# Patient Record
Sex: FEMALE | Race: WHITE | HISPANIC OR LATINO | Employment: STUDENT | URBAN - METROPOLITAN AREA
[De-identification: names, ages, dates, MRNs, and addresses within clinical notes are randomized per-mention and may not be internally consistent; named-entity substitution may affect disease eponyms.]

---
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
## 2020-01-02 ENCOUNTER — TELEPHONE (OUTPATIENT)
Dept: INTERNAL MEDICINE CLINIC | Facility: CLINIC | Age: 20
End: 2020-01-02

## 2020-01-02 DIAGNOSIS — N64.4 BREAST PAIN: Primary | ICD-10-CM

## 2020-01-02 DIAGNOSIS — N64.59 INVERTED NIPPLE: ICD-10-CM

## 2020-01-02 NOTE — TELEPHONE ENCOUNTER
Patient called requesting an order for a mammogram before she goes back to college       Please contact Kenia and let her know if this can be done at    [phone]

## 2020-01-07 ENCOUNTER — TELEPHONE (OUTPATIENT)
Dept: INTERNAL MEDICINE CLINIC | Facility: CLINIC | Age: 20
End: 2020-01-07

## 2020-01-07 ENCOUNTER — HOSPITAL ENCOUNTER (OUTPATIENT)
Dept: MAMMOGRAPHY | Facility: CLINIC | Age: 20
Discharge: HOME | End: 2020-01-07
Payer: COMMERCIAL

## 2020-01-07 ENCOUNTER — HOSPITAL ENCOUNTER (OUTPATIENT)
Dept: ULTRASOUND IMAGING | Facility: CLINIC | Age: 20
Discharge: HOME | End: 2020-01-07
Payer: COMMERCIAL

## 2020-01-07 DIAGNOSIS — N64.4 BREAST PAIN: ICD-10-CM

## 2020-01-07 DIAGNOSIS — N64.59 INVERTED NIPPLE: ICD-10-CM

## 2020-01-07 PROCEDURE — 76642 ULTRASOUND BREAST LIMITED: CPT

## 2020-04-07 ENCOUNTER — TELEMEDICINE (OUTPATIENT)
Dept: INTERNAL MEDICINE CLINIC | Facility: CLINIC | Age: 20
End: 2020-04-07
Payer: COMMERCIAL

## 2020-04-07 VITALS — WEIGHT: 112 LBS | HEIGHT: 63 IN | BODY MASS INDEX: 19.84 KG/M2

## 2020-04-07 DIAGNOSIS — F41.9 ANXIETY: Primary | ICD-10-CM

## 2020-04-07 DIAGNOSIS — J45.990 EXERCISE-INDUCED ASTHMA: ICD-10-CM

## 2020-04-07 PROCEDURE — 3008F BODY MASS INDEX DOCD: CPT | Performed by: INTERNAL MEDICINE

## 2020-04-07 PROCEDURE — 99214 OFFICE O/P EST MOD 30 MIN: CPT | Performed by: INTERNAL MEDICINE

## 2020-04-07 RX ORDER — CLONAZEPAM 0.5 MG/1
0.5 TABLET ORAL 2 TIMES DAILY PRN
Qty: 45 TABLET | Refills: 0 | Status: SHIPPED | OUTPATIENT
Start: 2020-04-07 | End: 2020-11-04 | Stop reason: SDUPTHER

## 2020-09-10 NOTE — PROGRESS NOTES
Chief Complaint  Left forearm PPD reading- 0 mm, negative  Froylan Judy  Active Problems    1  Anxiety (300 00) (F41 9)   2  Encounter for PPD test (V74 1) (Z11 1)   3  Encounter for vitamin deficiency screening (V77 99) (Z13 21)   4  Exercise-induced asthma (493 81) (J45 990)   5  Inability to sleep (780 50) (G47 9)   6  Laryngotracheitis (464 20) (J04 2)   7  Lyme disease (088 81) (A69 20)   8  Need for hepatitis A immunization (V05 3) (Z23)   9  Screening for iron deficiency anemia (V78 0) (Z13 0)   10  Vitamin D deficiency (268 9) (E55 9)    Current Meds   1  [facility] Melatonin 3 MG Oral Tablet; TAKE 1 TABLE AT BEDTIME FOR SLEEP  MAY   INCREASE TO 2 TABLETS AS NEEDED; Therapy: 86TSM5632 to (Last Rx:74Nys9883)  Requested for: 23XPM4669 Ordered   2  Sertraline HCl - 25 MG Oral Tablet; TAKE 1 TABLET Bedtime; Therapy: 60IJR2575 to (Complete:15Ktl2199)  Requested for: 26Apr2017; Last   Rx:41Mlv0746 Ordered   3  Ventolin  (90 Base) MCG/ACT Inhalation Aerosol Solution; INHALE 2 PUFFS   EVERY 4 HOURS AS NEEDED FOR WHEEZING; Therapy: 02GHJ4828 to (Last [facility])  Requested for: 75Btv8597 Ordered   4  Vitamin D3 2000 UNIT Oral Capsule; TAKE 2 CAPSULES BY MOUTH EVERY DAY; Therapy: 86MCF0755 to (Last Rx:31Jtw0972)  Requested for: 93EOQ9400 Ordered    Allergies    1  No Known Drug Allergies    Future Appointments    Date/Time Provider Specialty Site   07/20/2017 04:00 PM James Sarmiento MD Pediatrics [address]     Signatures   Electronically signed by :  Idris Ash, ; Jul 19 2017 11:29AM EST                       (Author)    Electronically signed by : Beth Zhang DO; Jul 19 2017  1:48PM EST                       (Co-participant) 82

## 2020-11-04 ENCOUNTER — OFFICE VISIT (OUTPATIENT)
Dept: FAMILY MEDICINE CLINIC | Facility: CLINIC | Age: 20
End: 2020-11-04
Payer: COMMERCIAL

## 2020-11-04 VITALS
WEIGHT: 98.2 LBS | DIASTOLIC BLOOD PRESSURE: 62 MMHG | TEMPERATURE: 98.7 F | SYSTOLIC BLOOD PRESSURE: 112 MMHG | BODY MASS INDEX: 18.07 KG/M2 | OXYGEN SATURATION: 91 % | HEART RATE: 79 BPM | HEIGHT: 62 IN

## 2020-11-04 DIAGNOSIS — F32.A ANXIETY AND DEPRESSION: ICD-10-CM

## 2020-11-04 DIAGNOSIS — R11.2 INTRACTABLE VOMITING WITH NAUSEA, UNSPECIFIED VOMITING TYPE: ICD-10-CM

## 2020-11-04 DIAGNOSIS — F41.9 ANXIETY: ICD-10-CM

## 2020-11-04 DIAGNOSIS — Z00.00 ANNUAL PHYSICAL EXAM: Primary | ICD-10-CM

## 2020-11-04 DIAGNOSIS — F12.10 MARIJUANA ABUSE: ICD-10-CM

## 2020-11-04 DIAGNOSIS — K21.9 GASTROESOPHAGEAL REFLUX DISEASE WITHOUT ESOPHAGITIS: ICD-10-CM

## 2020-11-04 DIAGNOSIS — Z76.89 ENCOUNTER TO ESTABLISH CARE: ICD-10-CM

## 2020-11-04 DIAGNOSIS — F41.9 ANXIETY AND DEPRESSION: ICD-10-CM

## 2020-11-04 DIAGNOSIS — J45.990 EXERCISE-INDUCED ASTHMA: ICD-10-CM

## 2020-11-04 PROBLEM — R11.10 INTRACTABLE VOMITING: Status: ACTIVE | Noted: 2020-11-04

## 2020-11-04 PROBLEM — R11.10 INTRACTABLE VOMITING: Status: RESOLVED | Noted: 2020-11-04 | Resolved: 2020-11-04

## 2020-11-04 PROCEDURE — 99385 PREV VISIT NEW AGE 18-39: CPT | Performed by: STUDENT IN AN ORGANIZED HEALTH CARE EDUCATION/TRAINING PROGRAM

## 2020-11-04 PROCEDURE — 4004F PT TOBACCO SCREEN RCVD TLK: CPT | Performed by: STUDENT IN AN ORGANIZED HEALTH CARE EDUCATION/TRAINING PROGRAM

## 2020-11-04 PROCEDURE — 3725F SCREEN DEPRESSION PERFORMED: CPT | Performed by: STUDENT IN AN ORGANIZED HEALTH CARE EDUCATION/TRAINING PROGRAM

## 2020-11-04 PROCEDURE — 3008F BODY MASS INDEX DOCD: CPT | Performed by: STUDENT IN AN ORGANIZED HEALTH CARE EDUCATION/TRAINING PROGRAM

## 2020-11-04 RX ORDER — CLONAZEPAM 0.5 MG/1
0.5 TABLET ORAL 2 TIMES DAILY PRN
Qty: 45 TABLET | Refills: 0 | Status: SHIPPED | OUTPATIENT
Start: 2020-11-04 | End: 2022-07-28 | Stop reason: CLARIF

## 2020-11-04 RX ORDER — CLONAZEPAM 0.5 MG/1
0.5 TABLET ORAL 2 TIMES DAILY PRN
Qty: 45 TABLET | Refills: 0 | Status: SHIPPED | OUTPATIENT
Start: 2020-11-04 | End: 2020-11-04

## 2020-11-04 RX ORDER — FAMOTIDINE 20 MG/1
20 TABLET, FILM COATED ORAL DAILY PRN
Qty: 90 TABLET | Refills: 0 | Status: SHIPPED | OUTPATIENT
Start: 2020-11-04 | End: 2020-11-04

## 2020-11-04 RX ORDER — FAMOTIDINE 20 MG/1
20 TABLET, FILM COATED ORAL DAILY PRN
Qty: 90 TABLET | Refills: 0 | Status: SHIPPED | OUTPATIENT
Start: 2020-11-04 | End: 2022-07-28 | Stop reason: CLARIF

## 2020-12-19 DIAGNOSIS — F41.9 ANXIETY: ICD-10-CM

## 2022-07-19 ENCOUNTER — TELEPHONE (OUTPATIENT)
Dept: INTERNAL MEDICINE CLINIC | Facility: CLINIC | Age: 22
End: 2022-07-19

## 2022-07-19 NOTE — TELEPHONE ENCOUNTER
Sister in law; Solitario Waller;   Christal Reyna [phone]    Pt's family all goes and see's calvin  Pt would like to establish with [facility] as well, please advise, call back

## 2022-07-28 ENCOUNTER — OFFICE VISIT (OUTPATIENT)
Dept: INTERNAL MEDICINE CLINIC | Facility: CLINIC | Age: 22
End: 2022-07-28
Payer: COMMERCIAL

## 2022-07-28 ENCOUNTER — APPOINTMENT (OUTPATIENT)
Dept: LAB | Facility: CLINIC | Age: 22
End: 2022-07-28
Payer: COMMERCIAL

## 2022-07-28 VITALS
RESPIRATION RATE: 16 BRPM | HEART RATE: 80 BPM | HEIGHT: 64 IN | BODY MASS INDEX: 16.32 KG/M2 | SYSTOLIC BLOOD PRESSURE: 104 MMHG | DIASTOLIC BLOOD PRESSURE: 62 MMHG | WEIGHT: 95.6 LBS

## 2022-07-28 DIAGNOSIS — N91.2 AMENORRHEA: ICD-10-CM

## 2022-07-28 DIAGNOSIS — Z13.6 SCREENING FOR CARDIOVASCULAR CONDITION: ICD-10-CM

## 2022-07-28 DIAGNOSIS — Z00.00 PHYSICAL EXAM: ICD-10-CM

## 2022-07-28 DIAGNOSIS — F12.10 MARIJUANA ABUSE: ICD-10-CM

## 2022-07-28 DIAGNOSIS — F41.9 ANXIETY AND DEPRESSION: Primary | ICD-10-CM

## 2022-07-28 DIAGNOSIS — R53.83 OTHER FATIGUE: ICD-10-CM

## 2022-07-28 DIAGNOSIS — E44.1 MILD PROTEIN-CALORIE MALNUTRITION (HCC): ICD-10-CM

## 2022-07-28 DIAGNOSIS — F32.A ANXIETY AND DEPRESSION: Primary | ICD-10-CM

## 2022-07-28 LAB
ALBUMIN SERPL BCP-MCNC: 4 G/DL (ref 3.5–5)
ALP SERPL-CCNC: 70 U/L (ref 46–116)
ALT SERPL W P-5'-P-CCNC: 21 U/L (ref 12–78)
ANION GAP SERPL CALCULATED.3IONS-SCNC: 3 MMOL/L (ref 4–13)
AST SERPL W P-5'-P-CCNC: 19 U/L (ref 5–45)
B-HCG SERPL-ACNC: ABNORMAL MIU/ML
BASOPHILS # BLD AUTO: 0.03 THOUSANDS/ΜL (ref 0–0.1)
BASOPHILS NFR BLD AUTO: 0 % (ref 0–1)
BILIRUB SERPL-MCNC: 0.33 MG/DL (ref 0.2–1)
BUN SERPL-MCNC: 14 MG/DL (ref 5–25)
CALCIUM SERPL-MCNC: 9.6 MG/DL (ref 8.3–10.1)
CHLORIDE SERPL-SCNC: 105 MMOL/L (ref 96–108)
CHOLEST SERPL-MCNC: 150 MG/DL
CO2 SERPL-SCNC: 27 MMOL/L (ref 21–32)
CREAT SERPL-MCNC: 0.57 MG/DL (ref 0.6–1.3)
EOSINOPHIL # BLD AUTO: 0.07 THOUSAND/ΜL (ref 0–0.61)
EOSINOPHIL NFR BLD AUTO: 1 % (ref 0–6)
ERYTHROCYTE [DISTWIDTH] IN BLOOD BY AUTOMATED COUNT: 11.2 % (ref 11.6–15.1)
GFR SERPL CREATININE-BSD FRML MDRD: 132 ML/MIN/1.73SQ M
GLUCOSE P FAST SERPL-MCNC: 75 MG/DL (ref 65–99)
HCT VFR BLD AUTO: 37.4 % (ref 34.8–46.1)
HDLC SERPL-MCNC: 55 MG/DL
HGB BLD-MCNC: 12.6 G/DL (ref 11.5–15.4)
IMM GRANULOCYTES # BLD AUTO: 0.01 THOUSAND/UL (ref 0–0.2)
IMM GRANULOCYTES NFR BLD AUTO: 0 % (ref 0–2)
LDLC SERPL CALC-MCNC: 86 MG/DL (ref 0–100)
LYMPHOCYTES # BLD AUTO: 2.17 THOUSANDS/ΜL (ref 0.6–4.47)
LYMPHOCYTES NFR BLD AUTO: 29 % (ref 14–44)
MCH RBC QN AUTO: 30.4 PG (ref 26.8–34.3)
MCHC RBC AUTO-ENTMCNC: 33.7 G/DL (ref 31.4–37.4)
MCV RBC AUTO: 90 FL (ref 82–98)
MONOCYTES # BLD AUTO: 0.7 THOUSAND/ΜL (ref 0.17–1.22)
MONOCYTES NFR BLD AUTO: 9 % (ref 4–12)
NEUTROPHILS # BLD AUTO: 4.55 THOUSANDS/ΜL (ref 1.85–7.62)
NEUTS SEG NFR BLD AUTO: 61 % (ref 43–75)
NONHDLC SERPL-MCNC: 95 MG/DL
NRBC BLD AUTO-RTO: 0 /100 WBCS
PLATELET # BLD AUTO: 264 THOUSANDS/UL (ref 149–390)
PMV BLD AUTO: 10 FL (ref 8.9–12.7)
POTASSIUM SERPL-SCNC: 3.9 MMOL/L (ref 3.5–5.3)
PROT SERPL-MCNC: 7.4 G/DL (ref 6.4–8.4)
RBC # BLD AUTO: 4.14 MILLION/UL (ref 3.81–5.12)
SODIUM SERPL-SCNC: 135 MMOL/L (ref 135–147)
TRIGL SERPL-MCNC: 45 MG/DL
TSH SERPL DL<=0.05 MIU/L-ACNC: 2.34 UIU/ML (ref 0.45–4.5)
WBC # BLD AUTO: 7.53 THOUSAND/UL (ref 4.31–10.16)

## 2022-07-28 PROCEDURE — 3725F SCREEN DEPRESSION PERFORMED: CPT | Performed by: NURSE PRACTITIONER

## 2022-07-28 PROCEDURE — 80061 LIPID PANEL: CPT

## 2022-07-28 PROCEDURE — 85025 COMPLETE CBC W/AUTO DIFF WBC: CPT

## 2022-07-28 PROCEDURE — 99215 OFFICE O/P EST HI 40 MIN: CPT | Performed by: NURSE PRACTITIONER

## 2022-07-28 PROCEDURE — 80053 COMPREHEN METABOLIC PANEL: CPT

## 2022-07-28 PROCEDURE — 84443 ASSAY THYROID STIM HORMONE: CPT

## 2022-07-28 PROCEDURE — 36415 COLL VENOUS BLD VENIPUNCTURE: CPT

## 2022-07-28 PROCEDURE — 84702 CHORIONIC GONADOTROPIN TEST: CPT

## 2022-07-28 NOTE — PROGRESS NOTES
INTERNAL MEDICINE FOLLOW-UP VISIT  [facility]    NAME: Hitesh Corporal  AGE: 25 y o  SEX: female  : 2000     DATE: 2022     Assessment and Plan:   1  Physical exam  Pt is here to establish primary care  She believes she may be pregnant - took several home preg tests and all positive  Will check quantitative  test and refer to gyn  Has already started prenatal     2  Anxiety and depression  She has long standing history of anxiety w/ adolescence cutting  Off meds x 1 year  But may want to consider meds during pregnancy  She will establish w/ gyn and we will readdress     3  Marijuana abuse  Has not smoked in 2 weeks since she found out she was pregnant    4  Amenorrhea  - Ambulatory Referral to Gynecology; Future  - CBC and differential; Future  - hCG, quantitative; Future    5  Other fatigue  - Comprehensive metabolic panel; Future  - TSH, 3rd generation with Free T4 reflex; Future    6  Screening for cardiovascular condition  - Lipid panel; Future      BMI Counseling: Body mass index is 16 41 kg/m²  The BMI is below normal  Patient advised to gain weight  Rationale for BMI follow-up plan is due to patient being underweight  No follow-ups on file  Chief Complaint:     Chief Complaint   Patient presents with    New Patient Visit      History of Present Illness:     Patient is here to establish primary care  We care for her sister and sister in law (isrrael and [facility])   Hx of anxiety was on medication, but has been off for over 1 year  Hx of cutting as adolsecent   Was smoking Mj daily until about 2 weeks ago when she found out she might be preg  She took several home preg test and all positive     Was smoking cigs too but stopped  Start prenatal   Needs gyn  Working at [employer] store   Has boyfriend America Issac   Still very anxious and may want to talk about meds during preg      The following portions of the patient's history were reviewed and updated as appropriate: allergies, current medications, past family history, past medical history, past social history, past surgical history and problem list      Review of Systems:     Review of Systems   Constitutional: Negative for appetite change, chills, diaphoresis, fatigue, fever and unexpected weight change  HENT: Negative for postnasal drip and sneezing  Eyes: Negative for visual disturbance  Respiratory: Negative for chest tightness and shortness of breath  Cardiovascular: Negative for chest pain, palpitations and leg swelling  Gastrointestinal: Negative for abdominal pain and blood in stool  Endocrine: Negative for cold intolerance, heat intolerance, polydipsia, polyphagia and polyuria  Genitourinary: Negative for difficulty urinating, dysuria, frequency and urgency  Musculoskeletal: Negative for arthralgias and myalgias  Skin: Negative for rash and wound  Neurological: Negative for dizziness, weakness, light-headedness and headaches  Hematological: Negative for adenopathy  Psychiatric/Behavioral: Negative for confusion, dysphoric mood and sleep disturbance  The patient is not nervous/anxious  Past Medical History:     Past Medical History:   Diagnosis Date    Lyme disease     Last assessed 3/17/2017    Psychiatric disorder         Current Medications:   No current outpatient medications on file  Allergies:   No Known Allergies     Physical Exam:     /62 (BP Location: Left arm, Patient Position: Sitting, Cuff Size: Standard)   Pulse 80   Resp 16   Ht 5' 4" (1 626 m)   Wt 43 4 kg (95 lb 9 6 oz)   BMI 16 41 kg/m²     Physical Exam  Constitutional:       Appearance: She is well-developed  HENT:      Head: Normocephalic and atraumatic  Eyes:      Pupils: Pupils are equal, round, and reactive to light  Neck:      Thyroid: No thyromegaly  Cardiovascular:      Rate and Rhythm: Normal rate and regular rhythm        Heart sounds: No murmur heard   Pulmonary:      Effort: Pulmonary effort is normal       Breath sounds: Normal breath sounds  Abdominal:      General: Bowel sounds are normal       Palpations: Abdomen is soft  Musculoskeletal:         General: Normal range of motion  Cervical back: Normal range of motion and neck supple  Lymphadenopathy:      Cervical: No cervical adenopathy  Skin:     General: Skin is warm and dry  Neurological:      Mental Status: She is alert and oriented to person, place, and time             Data:       LISA Gann  [facility]

## 2022-07-29 ENCOUNTER — TELEPHONE (OUTPATIENT)
Dept: INTERNAL MEDICINE CLINIC | Facility: CLINIC | Age: 22
End: 2022-07-29

## 2022-07-29 NOTE — TELEPHONE ENCOUNTER
----- Message from Luann Buchanan sent at 7/29/2022  7:37 AM EDT -----  Her basic blood work was normal  Her pregnancy test confirms pregancy and she is about 6-8 weeks pregnant- so she can go ahead and call gyn now

## 2022-07-29 NOTE — TELEPHONE ENCOUNTER
----- Message from Sarita Teague, [address] sent at 7/29/2022  7:37 AM EDT -----  Her basic blood work was normal  Her pregnancy test confirms pregancy and she is about 6-8 weeks pregnant- so she can go ahead and call gyn now

## 2022-08-23 ENCOUNTER — ULTRASOUND (OUTPATIENT)
Dept: OBGYN CLINIC | Facility: CLINIC | Age: 22
End: 2022-08-23
Payer: COMMERCIAL

## 2022-08-23 VITALS
HEIGHT: 64 IN | DIASTOLIC BLOOD PRESSURE: 72 MMHG | WEIGHT: 99.2 LBS | SYSTOLIC BLOOD PRESSURE: 116 MMHG | BODY MASS INDEX: 16.94 KG/M2

## 2022-08-23 DIAGNOSIS — Z3A.10 10 WEEKS GESTATION OF PREGNANCY: ICD-10-CM

## 2022-08-23 DIAGNOSIS — N91.2 AMENORRHEA: ICD-10-CM

## 2022-08-23 DIAGNOSIS — O36.80X0 ENCOUNTER TO DETERMINE FETAL VIABILITY OF PREGNANCY, SINGLE OR UNSPECIFIED FETUS: Primary | ICD-10-CM

## 2022-08-23 DIAGNOSIS — Z34.01 ENCOUNTER FOR PRENATAL CARE OF FIRST PREGNANCY, FIRST TRIMESTER: ICD-10-CM

## 2022-08-23 PROCEDURE — 76817 TRANSVAGINAL US OBSTETRIC: CPT | Performed by: OBSTETRICS & GYNECOLOGY

## 2022-08-23 NOTE — PROGRESS NOTES
Assessment/Plan:         Diagnoses and all orders for this visit:    Encounter to determine fetal viability of pregnancy, single or unspecified fetus  Comments:  Ultrasound PABLO in agreement with LMP   3/17/2023    Orders:  -     AMB US OB < 14 weeks single or first gestation level 1    Amenorrhea  -     Ambulatory Referral to Gynecology    10 weeks gestation of pregnancy  -     Ambulatory Referral to Maternal Fetal Medicine; Future    Body mass index (BMI) less than 19 in adult  -     Ambulatory Referral to Maternal Fetal Medicine; Future    Encounter for prenatal care of first pregnancy, first trimester  -     Ambulatory Referral to Maternal Fetal Medicine; Future    Other orders  -     Prenatal Vit-Iron Carbonyl-FA (prenatal multivitamin) TABS; Take 1 tablet by mouth daily          Subjective:      Patient ID: Spring Pradhan is a 25 y o  female  Patient here for early [address]  LMP 6/10/22  Reports feeling well over all  No issues to report  Started prenatal  This is her first pregnancy and was planned  Boyfriend accompanying patient today     Patient does not have any questions or concerns at this time  The following portions of the patient's history were reviewed and updated as appropriate:   She  has a past medical history of Lyme disease and Psychiatric disorder  She   Patient Active Problem List    Diagnosis Date Noted    10 weeks gestation of pregnancy 08/23/2022    Mild protein-calorie malnutrition ([facility] [address] ) 07/28/2022    Anxiety and depression 11/04/2020    Gastroesophageal reflux disease without esophagitis 11/04/2020    Body mass index (BMI) less than 19 in adult 11/04/2020    Marijuana abuse 11/04/2020    Exercise-induced asthma 09/25/2014     She  has a past surgical history that includes No past surgeries  Her family history includes Anemia in her mother; Asthma in her sister; Diabetes in her maternal grandmother and paternal grandmother; Heart attack in her paternal grandfather;  No Known Problems in her father and maternal grandfather; Vitamin D deficiency in her mother  She  reports that she quit smoking about 7 weeks ago  Her smoking use included cigarettes  She smoked 0 25 packs per day  She has never used smokeless tobacco  She reports previous alcohol use  She reports previous drug use  Drug: Marijuana  Current Outpatient Medications   Medication Sig Dispense Refill    Prenatal Vit-Iron Carbonyl-FA (prenatal multivitamin) TABS Take 1 tablet by mouth daily       No current facility-administered medications for this visit  Current Outpatient Medications on File Prior to Visit   Medication Sig    Prenatal Vit-Iron Carbonyl-FA (prenatal multivitamin) TABS Take 1 tablet by mouth daily     No current facility-administered medications on file prior to visit  She has No Known Allergies       Review of Systems      Objective: There were no vitals taken for this visit           Physical Exam

## 2022-08-24 ENCOUNTER — INITIAL PRENATAL (OUTPATIENT)
Dept: OBGYN CLINIC | Facility: CLINIC | Age: 22
End: 2022-08-24

## 2022-08-24 VITALS — WEIGHT: 99 LBS | BODY MASS INDEX: 16.9 KG/M2 | HEIGHT: 64 IN

## 2022-08-24 DIAGNOSIS — Z34.01 ENCOUNTER FOR SUPERVISION OF NORMAL FIRST PREGNANCY IN FIRST TRIMESTER: Primary | ICD-10-CM

## 2022-08-24 PROCEDURE — OBC: Performed by: OBSTETRICS & GYNECOLOGY

## 2022-08-24 NOTE — PROGRESS NOTES
OB INTAKE INTERVIEW    Patient is 25 yrs old who presents for OB intake at 10w5d   She is accompanied by: Self via telephone intake   The father of her baby Robin Pinto is involved in the pregnancy and is 25years old    Last Menstrual Period: Ruth 10, 22  Ultrasound: Measured 10 w 2 d   Estimated Date of Delivery: 3/17/22 consistent with LMP confirmed by US    Signs/Symptoms of Pregnancy    Current pregnancy symptoms: Fatigue, breast tenderness  Denies N&V  Constipation no  Headaches no  Cramping/spotting no  PICA cravings no    Diabetes-  There is no height or weight on file to calculate BMI  If patient has 1 or more, please order early 1 hour GTT  History of GDM no  BMI >35 no  History of PCOS or current metformin use no  History of LGA/macrosomic infant (4000g/9lbs) no    If patient has 2 or more, please order early 1 hour GTT  BMI>30 no  AMA no  First degree relative with type 2 diabetes no  History of chronic HTN, hyperlipidemia, elevated A1C no  High risk race (, , ,  or ) no    Hypertension- if you answer yes, please order preeclampsia labs (cbc, comprehensive metabolic panel, urine protein creatinine ratio, uric acid)  History of of chronic HTN no  History of gestational HTN no  History of preeclampsia, eclampsia, or HELLP syndrome no  History of diabetes no  History of lupus, autoimmune disease, kidney disease no    Thyroid- if yes order TSH with reflex T4  History of thyroid disease no    Bleeding Disorder or Hx of DVT-patient or first degree relative with history of  Order the following if not done previously     (Factor V, antithrombin III, prothrombin gene mutation, protein C and S Ag, lupus anticoagulant, anticardiolipin, beta-2 glycoprotein)   no    OB/GYN-  History of abnormal pap smear no  History of HPV no  History of Herpes/HSV no  History of other STI (gonorrhea, chlamydia, trich) no  History of prior  no  History of prior  no  History of  delivery prior to 36 weeks 6 days no  History of blood transfusion no  Ok for blood transfusion Yes    Substance screening- if yes outside of tobacco for her or anyone in her home-order urine drug screen  History of tobacco use Yes, socially, stopped with known pregnancy  Currently using tobacco no  Currently using alcohol no  Presently using drugs no  Past drug use  no  IV drug use-If yes add Hep C antibody to labs no  Partner drug use no  Parent/Family drug use no    MRSA Screening-   Does the pt have a hx of MRSA? no  If yes- please follow MRSA protocol and obtain a nasal swab for MRSA culture    Immunizations:  Influenza vaccine given this season No  Discussed Tdap vaccine Yes  Discussed COVID Vaccine Yes    Genetic/MFM-  Do you or your partner have a history of any of the following in yourselves or first degree relatives? Cystic fibrosis no  Spinal muscular atrophy no  Hemoglobinopathy/Sickle Cell/Thalassemia no  Fragile X Intellectual Disability no    If yes, discuss carrier screening and recommend consultation with [facility]/genetic counseling  If no, discuss option for carrier screening and/or genetic testing with [facility]  Patient interested Yes  Appointment at [facility] made Scheduled virtual for     Interview education    [facility] Pregnancy Essentials Book reviewed and discussed Yes  Nurse/Family Partnership- patient may qualify No; referral placed No  Prenatal lab work scripts Yes  Extra labs ordered:Urine drug screen    Details that I feel the provider should be aware of:     · History of Anxiety and Depression   · Considering resuming Zoloft   · Lyme Disease, asymptomatic  · Previous marijuana use, will do urine drug screen  · BMI less than 19    Patient states pregnncy was planned and welcomed  Advised not to change cat litter  Lifting restrictions sent  PN1 visit scheduled   The patient was oriented to our practice, reviewed delivering physicians and [facility] for Delivery  All questions were answered  [facility] appt  Interviewed by: Nikki Christopher at [employer], both work at [employer]  Patient states she has a virtual visit with Genetics  Will be away 6-13th and was told no opening for NT US  I placed call to [facility], left message for call back

## 2022-08-24 NOTE — PATIENT INSTRUCTIONS
Congratulations!! Please review our Pregnancy Essential Guide and [facility] L&D Virtual tour from our [employer]  [facility] Pregnancy Essentials Guide    [facility] ([address] [facility])     [address] ([employer])        Pregnancy at 11 to 14 Weeks   [address]:   You are now at the end of your first trimester and entering your second trimester  Morning sickness usually goes away by this time  You may have other symptoms such as fatigue, frequent urination, and headaches  You may have gained 2 to 4 pounds by now  DISCHARGE INSTRUCTIONS:   Return to the emergency department if:   You have pain or cramping in your abdomen or low back  You have heavy vaginal bleeding or clotting  You pass material that looks like tissue or large clots  Collect the material and bring it with you  Call your doctor or obstetrician if:   You cannot keep food or drinks down, and you are losing weight  You have light bleeding  You have chills or a fever  You have vaginal itching, burning, or pain  You have yellow, green, white, or foul-smelling vaginal discharge  You have pain or burning when you urinate, less urine than usual, or pink or bloody urine  You have questions or concerns about your condition or care  How to care for yourself at this stage of your pregnancy:       Get plenty of rest   You may feel more tired than normal  You may need to take naps or go to bed earlier  Manage nausea and vomiting  Avoid fatty and spicy foods  Eat small meals throughout the day instead of large meals  Kortney may help to decrease nausea  Ask your healthcare provider about other ways of decreasing nausea and vomiting  Eat a variety of healthy foods  Healthy foods include fruits, vegetables, whole-grain breads, low-fat dairy foods, beans, lean meats, and fish  Drink liquids as directed   Ask how much liquid to drink each day and which liquids are best for you  Limit caffeine to less than 200 milligrams each day  Limit your intake of fish to 2 servings each week  Choose fish low in mercury such as canned light tuna, shrimp, salmon, cod, or tilapia  Do not  eat fish high in mercury such as swordfish, tilefish, zachary mackerel, and shark  Take prenatal vitamins as directed  Your need for certain vitamins and minerals, such as folic acid, increases during pregnancy  Prenatal vitamins provide some of the extra vitamins and minerals you need  Prenatal vitamins may also help to decrease the risk of certain birth defects  Do not smoke  Smoking increases your risk of a miscarriage and other health problems during your pregnancy  Smoking can cause your baby to be born too early or weigh less at birth  Ask your healthcare provider for information if you need help quitting  Do not drink alcohol  Alcohol passes from your body to your baby through the placenta  It can affect your baby's brain development and cause fetal alcohol syndrome (FAS)  FAS is a group of conditions that causes mental, behavior, and growth problems  Talk to your healthcare provider before you take any medicines  Many medicines may harm your baby if you take them when you are pregnant  Do not take any medicines, vitamins, herbs, or supplements without first talking to your healthcare provider  Never use illegal or street drugs (such as marijuana or cocaine) while you are pregnant  Safety tips during pregnancy:   Avoid hot tubs and saunas  Do not use a hot tub or sauna while you are pregnant, especially during your first trimester  Hot tubs and saunas may raise your baby's temperature and increase the risk of birth defects  Avoid toxoplasmosis  This is an infection caused by eating raw meat or being around infected cat feces  It can cause birth defects, miscarriages, and other problems  Wash your hands after you touch raw meat   Make sure any meat is well-cooked before you eat it  Avoid raw eggs and unpasteurized milk  Use gloves or ask someone else to clean your cat's litter box while you are pregnant  Changes happening with your baby: Your baby has fully formed fingernails and toenails  Your baby's heartbeat can now be heard  Ask your healthcare provider if you can listen to your baby's heartbeat  By week 14, your baby is over 4 inches long from the top of the head to the rump (baby's bottom)  Your baby weighs over 3 ounces  Prenatal care:  Prenatal care is a series of visits with your healthcare provider throughout your pregnancy  During the first 28 weeks of your pregnancy, you will see your healthcare provider 1 time each month  Prenatal care can help prevent problems during pregnancy and childbirth  Your healthcare provider will check your blood pressure and weight  Your baby's heart rate will also be checked  You may also need the following at some visits:  A pelvic exam  allows your healthcare provider to see your cervix (the bottom part of your uterus)  Your healthcare provider will use a speculum to open your vagina  He or she will check the size and shape of your uterus  Blood tests  may be done to check for any of the following:    Gestational diabetes or anemia (low iron level)    Blood type or Rh factor, or certain birth defects    Immunity to certain diseases, such as chickenpox or rubella    An infection, such as a sexually transmitted infection, HIV, or hepatitis B    Hepatitis B  may need to be prevented or treated  Hepatitis B is inflammation of the liver caused by the hepatitis B virus (HBV)  HBV can spread from a mother to her baby during delivery  You will be checked for HBV as early as possible in the first trimester of each pregnancy  You need the test even if you received the hepatitis B vaccine or were tested before  You may need to have an HBV infection treated before you give birth  Urine tests  may also be done to check for sugar and protein   These can be signs of gestational diabetes or preeclampsia  Urine tests may also be done to check for signs of infection  A fetal ultrasound  shows pictures of your baby inside your uterus  The pictures are used to check your baby's development, movement, and position  Genetic disorder screening tests  may be offered to you  These tests check your baby's risk for genetic disorders such as Down syndrome  A screening test includes a blood test and ultrasound  Follow up with your doctor or obstetrician as directed:  Go to all prenatal visits  Write down your questions so you remember to ask them during your visits  © Copyright [employer] 2022 Information is for End User's use only and may not be sold, redistributed or otherwise used for commercial purposes  All illustrations and images included in CareNotes® are the copyrighted property of [employer]  or Joel Bates  The above information is an  only  It is not intended as medical advice for individual conditions or treatments  Talk to your doctor, nurse or pharmacist before following any medical regimen to see if it is safe and effective for you

## 2022-09-12 PROBLEM — Z3A.13 13 WEEKS GESTATION OF PREGNANCY: Status: ACTIVE | Noted: 2022-08-23

## 2022-09-13 ENCOUNTER — INITIAL PRENATAL (OUTPATIENT)
Dept: OBGYN CLINIC | Facility: CLINIC | Age: 22
End: 2022-09-13

## 2022-09-13 VITALS
BODY MASS INDEX: 18.27 KG/M2 | SYSTOLIC BLOOD PRESSURE: 100 MMHG | DIASTOLIC BLOOD PRESSURE: 64 MMHG | WEIGHT: 107 LBS | HEIGHT: 64 IN

## 2022-09-13 DIAGNOSIS — Z12.4 SCREENING FOR CERVICAL CANCER: ICD-10-CM

## 2022-09-13 DIAGNOSIS — Z3A.13 13 WEEKS GESTATION OF PREGNANCY: Primary | ICD-10-CM

## 2022-09-13 DIAGNOSIS — Z11.3 SCREENING FOR STDS (SEXUALLY TRANSMITTED DISEASES): ICD-10-CM

## 2022-09-13 PROBLEM — K21.9 GASTROESOPHAGEAL REFLUX DISEASE WITHOUT ESOPHAGITIS: Status: RESOLVED | Noted: 2020-11-04 | Resolved: 2022-09-13

## 2022-09-13 LAB
SL AMB  POCT GLUCOSE, UA: NEGATIVE
SL AMB POCT URINE PROTEIN: NEGATIVE

## 2022-09-13 PROCEDURE — PNV

## 2022-09-13 PROCEDURE — 87491 CHLMYD TRACH DNA AMP PROBE: CPT

## 2022-09-13 PROCEDURE — 87591 N.GONORRHOEAE DNA AMP PROB: CPT

## 2022-09-13 PROCEDURE — G0145 SCR C/V CYTO,THINLAYER,RESCR: HCPCS

## 2022-09-13 NOTE — PROGRESS NOTES
Problem   13 Weeks Gestation of Pregnancy    Blood Type:   Antibody-   Pap Not on file  Collected at pn1  GC/CT -  Collected at [facility] vaccine-   Flu vaccine -  Blue folder- Has    Genetic screening-   AFP-  Level 1- 09/15/22  Level 2- 10/28/22    Yellow folder-  TDAP -   Rhogam? -   Delivery consent-  Breast pump - plans to breastfeed  Pediatrician -    Perineal massage -  GBS swab -   IOL -     Gastroesophageal Reflux Disease Without Esophagitis (Resolved)     13 weeks gestation of pregnancy  PN1  Burke Lyles is a 25y o  year old  at 13w4d who presents for initial prenatal visit  Planned pregnancy  Here with FOB Antonio  LMP: Patient's last menstrual period was 06/10/2022  Gestational age 17w3d based on LMP Yes , consistent with early US Yes    1  Para 0000           Previous C Section: No    PMHx noncontributory  Her obstetrical, medical, surgical and family history was reviewed  Prepregnancy BMI: 18 37  Vitals:    22 1454   BP: 100/64   BP Location: Left arm   Patient Position: Sitting   Cuff Size: Standard   Weight: 48 5 kg (107 lb)   Height: 5' 4" (1 626 m)        Her physical exam was within normal limits     She denies nausea and vomiting  She has had no vaginal bleeding  Patients has no complaints  Denies pelvic pain or cramping  NT scan scheduled on 9/15/22  She is planning to complete genetic screening  Allergies: Patient has no known allergies  Medication use :   Current Outpatient Medications   Medication Sig Dispense Refill    Prenatal Vit-Iron Carbonyl-FA (prenatal multivitamin) TABS Take 1 tablet by mouth daily       No current facility-administered medications for this visit  She is a former smoker, quit with this pregnancy  No issues with smoking cessation  Prenatal Labs   pending  GCCT collected today   Pap Not on file   A Pap smear was obtained,    Patient Education: Patient was counseled regarding diet, exercise, weight gain, foods to avoid, vaccines in pregnancy, aneuploidy screening, travel precautions to include seat belt use and VTE risk reduction  She has been provided our pregnancy packet which includes pregnancy warning signs,how and when to contact providers, visit intervals, Maternal fetal medicine information, medication recommendations that are safe during pregnancy, dietary suggestions, activity recommendations, breastfeeding information as well as websites for additional information, and delivery location

## 2022-09-13 NOTE — PATIENT INSTRUCTIONS
Pregnancy at 11 to 14 Weeks   AMBULATORY CARE:   Changes happening to your body: You are now at the end of your first trimester and entering your second trimester  Morning sickness usually goes away by this time  You may have other symptoms such as fatigue, frequent urination, and headaches  You may have gained 2 to 4 pounds by now  Seek care immediately if:   You have pain or cramping in your abdomen or low back  You have heavy vaginal bleeding or clotting  You pass material that looks like tissue or large clots  Collect the material and bring it with you  Call your doctor or obstetrician if:   You cannot keep food or drinks down, and you are losing weight  You have light vaginal bleeding  You have chills or a fever  You have vaginal itching, burning, or pain  You have yellow, green, white, or foul-smelling vaginal discharge  You have pain or burning when you urinate, less urine than usual, or pink or bloody urine  You have questions or concerns about your condition or care  How to care for yourself at this stage of your pregnancy:       Get plenty of rest   You may feel more tired than normal  You may need to take naps or go to bed earlier  Manage nausea and vomiting  Avoid fatty and spicy foods  Eat small meals throughout the day instead of large meals  Kortney may help to decrease nausea  Ask your healthcare provider about other ways of decreasing nausea and vomiting  Eat a variety of healthy foods  Healthy foods include fruits, vegetables, whole-grain breads, low-fat dairy foods, beans, lean meats, and fish  Drink liquids as directed  Ask how much liquid to drink each day and which liquids are best for you  Limit caffeine to less than 200 milligrams each day  Limit your intake of fish to 2 servings each week  Choose fish low in mercury such as canned light tuna, shrimp, salmon, cod, or tilapia   Do not  eat fish high in mercury such as swordfish, tilefish, zachary mackerel, and shark  Take prenatal vitamins as directed  Your need for certain vitamins and minerals, such as folic acid, increases during pregnancy  Prenatal vitamins provide some of the extra vitamins and minerals you need  Prenatal vitamins may also help to decrease the risk of certain birth defects  Do not smoke  Smoking increases your risk of a miscarriage and other health problems during your pregnancy  Smoking can cause your baby to be born too early or weigh less at birth  Ask your healthcare provider for information if you need help quitting  Do not drink alcohol  Alcohol passes from your body to your baby through the placenta  It can affect your baby's brain development and cause fetal alcohol syndrome (FAS)  FAS is a group of conditions that causes mental, behavior, and growth problems  Talk to your healthcare provider before you take any medicines  Many medicines may harm your baby if you take them when you are pregnant  Do not take any medicines, vitamins, herbs, or supplements without first talking to your healthcare provider  Never use illegal or street drugs (such as marijuana or cocaine) while you are pregnant  Safety tips during pregnancy:   Avoid hot tubs and saunas  Do not use a hot tub or sauna while you are pregnant, especially during your first trimester  Hot tubs and saunas may raise your baby's temperature and increase the risk of birth defects  Avoid toxoplasmosis  This is an infection caused by eating raw meat or being around infected cat feces  It can cause birth defects, miscarriages, and other problems  Wash your hands after you touch raw meat  Make sure any meat is well-cooked before you eat it  Avoid raw eggs and unpasteurized milk  Use gloves or ask someone else to clean your cat's litter box while you are pregnant  Changes happening with your baby: Your baby has fully formed fingernails and toenails  Your baby's heartbeat can now be heard   Ask your healthcare provider if you can listen to your baby's heartbeat  By week 14, your baby is over 4 inches long from the top of the head to the rump (baby's bottom)  Your baby weighs over 3 ounces  Prenatal care:  Prenatal care is a series of visits with your healthcare provider throughout your pregnancy  During the first 28 weeks of your pregnancy, you will see your healthcare provider 1 time each month  Prenatal care can help prevent problems during pregnancy and childbirth  Your healthcare provider will check your blood pressure and weight  Your baby's heart rate will also be checked  You may also need the following at some visits:  A pelvic exam  allows your healthcare provider to see your cervix (the bottom part of your uterus)  Your healthcare provider will use a speculum to open your vagina  He or she will check the size and shape of your uterus  Blood tests  may be done to check for any of the following:    Gestational diabetes or anemia (low iron level)    Blood type or Rh factor, or certain birth defects    Immunity to certain diseases, such as chickenpox or rubella    An infection, such as a sexually transmitted infection, HIV, or hepatitis B    Hepatitis B  may need to be prevented or treated  Hepatitis B is inflammation of the liver caused by the hepatitis B virus (HBV)  HBV can spread from a mother to her baby during delivery  You will be checked for HBV as early as possible in the first trimester of each pregnancy  You need the test even if you received the hepatitis B vaccine or were tested before  You may need to have an HBV infection treated before you give birth  Urine tests  may also be done to check for sugar and protein  These can be signs of gestational diabetes or preeclampsia  Urine tests may also be done to check for signs of infection  A fetal ultrasound  shows pictures of your baby inside your uterus  The pictures are used to check your baby's development, movement, and position  Genetic disorder screening tests  may be offered to you  These tests check your baby's risk for genetic disorders such as Down syndrome  A screening test includes a blood test and ultrasound  Follow up with your doctor or obstetrician as directed:  Go to all prenatal visits  Write down your questions so you remember to ask them during your visits  © Copyright [employer]  Information is for End User's use only and may not be sold, redistributed or otherwise used for commercial purposes  All illustrations and images included in CareNotes® are the copyrighted property of A D A M , Inc  or [facility] Esmer Lundy   The above information is an  only  It is not intended as medical advice for individual conditions or treatments  Talk to your doctor, nurse or pharmacist before following any medical regimen to see if it is safe and effective for you  Valuable Online Resource:    [facility] pregnancy essential guide    http://nicole sethi/      On the right side of the screen is a 50 page guide providing valuable information about your entire pregnancy  On the left hand side of the site you will see several other links to great information and resources that [facility] offers     If you click on the tab that says "Pregnancy and Birth Packet" this opens another  guide to labor and delivery information as well as breast feeding information,  care, pediatricians, car seat safety and much more     The [facility] and [address] tab has a virtual tour of the new L&D unit, as well as valuable information about classes that are offered, breast feeding support, support groups and much more  I highly recommend the virtual Breast Feeding class, very informational even if you have breast fed in the past  Check for available dates ! Click around and enjoy all we have to offer!     Please note that all information in regards to locations and visiting hours have not been updated due to 2 [address] Brandt delivery location is [address] [place] [address],[address] @ [facility] [address], [id number] [address] [place] [id number]

## 2022-09-13 NOTE — ASSESSMENT & PLAN NOTE
Ju Asher is a 25y o  year old  at 13w4d who presents for initial prenatal visit  Planned pregnancy  Here with FOB Antonio  LMP: Patient's last menstrual period was 06/10/2022  Gestational age 17w3d based on LMP Yes , consistent with early US Yes    1  Para 0000           Previous C Section: No    PMHx noncontributory  Her obstetrical, medical, surgical and family history was reviewed  Prepregnancy BMI: 18 37  total expected weight gain for this pregnancy is 12 5 kg (27 lb)-18 kg (39 lb)  Vitals:    22 1454   BP: 100/64   BP Location: Left arm   Patient Position: Sitting   Cuff Size: Standard   Weight: 48 5 kg (107 lb)   Height: 5' 4" (1 626 m)        Her physical exam was within normal limits     She denies nausea and vomiting  She has had no vaginal bleeding  Patients has no complaints  Denies pelvic pain or cramping  NT scan scheduled on 9/15/22  She is planning to complete genetic screening  Allergies: Patient has no known allergies  Medication use :   Current Outpatient Medications   Medication Sig Dispense Refill    Prenatal Vit-Iron Carbonyl-FA (prenatal multivitamin) TABS Take 1 tablet by mouth daily       No current facility-administered medications for this visit  She is a former smoker, quit with this pregnancy  No issues with smoking cessation  Prenatal Labs   pending  GCCT collected today   Pap Not on file  A Pap smear was obtained,    Patient Education: Patient was counseled regarding diet, exercise, weight gain, foods to avoid, vaccines in pregnancy, aneuploidy screening, travel precautions to include seat belt use and VTE risk reduction    She has been provided our pregnancy packet which includes pregnancy warning signs,how and when to contact providers, visit intervals, Maternal fetal medicine information, medication recommendations that are safe during pregnancy, dietary suggestions, activity recommendations, breastfeeding information as well as websites for additional information, and delivery location

## 2022-09-13 NOTE — PROGRESS NOTES
Patient is here for prenatal ob visit today  No question's or concerns at this time  Blue folder was reviewed today  GA: 13w4d  Estimated Date of Delivery: 3/17/23    Urine: Protein Negative / Glucose Negative  Denies loss of fluid, vaginal bleeding and contractions  Pn1 labs are completed and up to date  Up to date with Covid vaccine

## 2022-09-15 ENCOUNTER — OFFICE VISIT (OUTPATIENT)
Dept: PERINATAL CARE | Facility: CLINIC | Age: 22
End: 2022-09-15
Payer: COMMERCIAL

## 2022-09-15 DIAGNOSIS — Z34.01 ENCOUNTER FOR PRENATAL CARE OF FIRST PREGNANCY, FIRST TRIMESTER: ICD-10-CM

## 2022-09-15 DIAGNOSIS — Z36.0 ENCOUNTER FOR ANTENATAL SCREENING FOR CHROMOSOMAL ANOMALIES: Primary | ICD-10-CM

## 2022-09-15 DIAGNOSIS — Z31.5 ENCOUNTER FOR PROCREATIVE GENETIC COUNSELING: ICD-10-CM

## 2022-09-15 LAB
C TRACH DNA SPEC QL NAA+PROBE: NEGATIVE
N GONORRHOEA DNA SPEC QL NAA+PROBE: NEGATIVE

## 2022-09-15 PROCEDURE — 36415 COLL VENOUS BLD VENIPUNCTURE: CPT | Performed by: OBSTETRICS & GYNECOLOGY

## 2022-09-15 PROCEDURE — NC001 PR NO CHARGE: Performed by: OBSTETRICS & GYNECOLOGY

## 2022-09-15 NOTE — PROGRESS NOTES
Genetic Counseling   High-Risk Gestation Note    Appointment Date:  9/15/2022  Referred By: Sadia Hoyt, *  YOB: 2000  Partner:  Janice Fitch     Indication for Visit:  prenatal testing and screening options    Pregnancy History:   Estimated Date of Delivery: 3/17/23  Estimated Gestational Age: 14w11d    Additional Information / Impression:  Latosha Phan is a 25 y o  female who presented with her partner for genetic counseling to discuss prenatal testing and screening options  We reviewed the patient's age related risk for chromosome abnormalities  The risk of Down syndrome at age 25 at delivery is 1440, and the risk for any chromosomal abnormality at this age is 1135  The differences between full chromosome aneuploidies and copy number variants (microdeletions and microduplications) was also discussed  We reviewed that copy number variants occur in about 0 4% of all pregnancies  Therefore, for patients under age 39 the risk for copy number variants is higher than the risk for Down syndrome  The risks, benefits, and limitations of amniocentesis were discussed with the patient  Amniocentesis is performed under direct real time ultrasound visualization to avoid both the fetus and the placenta  Once amniotic fluid is withdrawn, laboratory analysis is performed and amniotic fluid alpha-fetoprotein, as well as chromosome and/or microarray analysis is undertaken  The risk of genetic amniocentesis includes, but is not limited to less than 1 in 300 pregnancy loss rate or  delivery rate if 23 weeks or greater, infection, bleeding, rupture of membranes, failure of cells to grow, karyotype error, laboratory error, etc   Occasionally a repeat amniocentesis is necessary due to cell culture failure  Chromosome/microarray analysis from amniocentesis is 99 9% accurate and alpha-fetoprotein analysis can detect approximately 95% of open neural tube defects      We reviewed the testing option of cell free fetal DNA screening (also known as noninvasive prenatal testing or NIPT)  We discussed that it is a serum test to identify fragments of placental DNA in maternal blood  We reviewed the benefits and limitations of cell free fetal DNA screening in detecting Down syndrome, Trisomy 13, Trisomy 25 and sex chromosome aneuploidies  We also discussed that cell free fetal DNA screening does not detect additional chromosomal abnormalities and the possibility of a failed test result  As cell free fetal DNA screening does not detect open neural tube defects, MSAFP screening is available at 15-20 weeks gestation  Quad screening consists of second trimester biochemical analysis of four analytes  It is able to detect approximately 81% of pregnancies in which the fetus has Down syndrome, 75% of pregnancies in which the fetus has trisomy 25 and 85% of pregnancies which the fetus has an open neural tube defect  It can also indirectly identify other chromosomal abnormalities, copy number variants, genetic syndromes, or adverse pregnancy outcomes if serum analyte levels are abnormal     We reviewed that level II anatomy ultrasound is typically performed at approximately 20 weeks gestation  Level II ultrasound evaluation is between 60-80% accurate in detecting major physical birth defects and variations in fetal development that may be associated with chromosome/genetic abnormalities  Level II ultrasound evaluation is not able to detect all birth defects or health problems  After discussing the available prenatal screening and testing options Kenia elected to pursue cell free fetal DNA screening  She was informed that the results will disclose the fetal sex and will be available in her MyChart to review  Her blood was drawn immediately after the counseling session  Results take approximately 7-10 days  The patient declined amniocentesis secondary to procedural related complications    She may reconsider diagnostic testing should the cell free fetal DNA screening come back abnormal   Sam Garrett is also planning on pursuing MSAFP screening and Level II ultrasound at the appropriate times  Histories for the patient and her partner's family were taken during our session  Atnonio's sister was diagnosed with ovarian cancer at 29years of age, but is currently doing well at age 28  We reviewed the availability of cancer genetic counseling for his sister should she be interested in learning of potential risk for a genetic predisposition for cancer  Sam Garrett reports a family and personal history of anxiety and depression  We reviewed that mental health concerns can be hereditary, thus there is an increased risk for the current pregnancy  Currently, there is no prenatal screening available and I encouraged her to discuss their history with their pediatrician  Further review of family history for the patient and her partner was noncontributory  The family history was not significant for other genetic diseases or disorders, intellectual disability, birth defects, fetal loss, or consanguinity  Patient reports being of  descent and that her partner is of [facility] descent  She denies either of them having known Ashkenazi [employer] ancestry  The benefits and limitations of Cystic fibrosis (CF), Spinal muscular atrophy (SMA), hemoglobinopathy, and expanded carrier screening was discussed  The patient was unsure of pursuing carrier screening at this time and elected to further consider the options  Should she decide to have any of the blood work performed she will contact our office or her OB office  Lastly, we discussed the fact that everyone in the general population regardless of age, family history, or medical background has approximately a 3-5% risk of having a child with some type of congenital anomaly, genetic disease or intellectual disability   Currently there are no tests available to rule out all birth defects or health problems  Lito Navarro was provided with take home literature and our contact information  I encouraged her to call with any questions or concerns  Plan/Tests Ordered:  1) Patient declined amniocentesis and Quad screen  2) Patient elected NIPT - [facility] blood work drawn immediately after our counseling session  3) MSAFP screening at 15-20 weeks gestation - to be ordered by patient OB office  4) Level II anatomy ultrasound scheduled for 10/28/22      Time spent with Genetic Counselor: 60 minutes

## 2022-09-15 NOTE — PROGRESS NOTES
Patient chose to have [facility] Non-invasive Prenatal Screen  Patient given brochure and is aware [facility] will contact patients insurance and coordinate coverage  Patient made aware she will need to respond to text message or e-mail from [employer] within 2 business days or testing will be run through insurance  Patient informed text message will come from area code  "415"  Provided [employer] # [phone] and web site : [email or web address] [email or web address]     2 vials of blood drawn from LEFT arm by Julissa Greenberg, patient tolerated blood draw without difficulty  Specimens labeled with patient identifiers (name, date of birth, specimen collection date), order and specimen was verified with patient, packed and sent via [employer]  Copy of lab order scanned to Epic media  Maternal Fetal Medicine will have results in approximately 7-10 business days and will call patient or notify via [address]  Patient aware viewing lab result online will reveal fetal sex If ordered  Patient verbalized understanding of all instructions and no questions at this time

## 2022-09-16 LAB
LAB AP GYN PRIMARY INTERPRETATION: NORMAL
Lab: NORMAL
PATH INTERP SPEC-IMP: NORMAL

## 2022-09-19 DIAGNOSIS — B96.89 BV (BACTERIAL VAGINOSIS): Primary | ICD-10-CM

## 2022-09-19 DIAGNOSIS — N76.0 BV (BACTERIAL VAGINOSIS): Primary | ICD-10-CM

## 2022-09-19 RX ORDER — METRONIDAZOLE 500 MG/1
500 TABLET ORAL EVERY 12 HOURS SCHEDULED
Qty: 14 TABLET | Refills: 0 | Status: SHIPPED | OUTPATIENT
Start: 2022-09-19 | End: 2022-09-26

## 2022-09-28 ENCOUNTER — TELEPHONE (OUTPATIENT)
Dept: PERINATAL CARE | Facility: CLINIC | Age: 22
End: 2022-09-28

## 2022-09-28 NOTE — TELEPHONE ENCOUNTER
Received VM message from patient's mother requesting a return phone call to reveal fetal sex from NIPT  Confirmed that patient's mother is on the patient's communication consent  Telephone call to patient's mother  Confirmed patient's date of birth  Advised her that the way NIPT was ordered sex chromosome aneuploidy was tested for but fetal sex was not predicted  She states that while the patient does not want to know the fetal sex she was clear on the fact that she wanted her mother and mother-in-law to be informed that information  Advised the patient's mother that I would route the message [facility] and can address Y was ordered that way  I assured her that it should not be a problem to get that date to provide the information  Patient's mother expressed understanding and a further questions at this time

## 2022-10-07 ENCOUNTER — TELEPHONE (OUTPATIENT)
Dept: PERINATAL CARE | Facility: CLINIC | Age: 22
End: 2022-10-07

## 2022-10-07 NOTE — TELEPHONE ENCOUNTER
Patient called back and confirmed date of birth  Gave verbal permission to change report to disclose fetal sex and to provide her mother with that information  Mother is on the signed communication report  Informed patient the report will take a few days to get released and she will get a MyChart notification  Patient expressed verbal understanding and had no questions  Message sent to [facility] to update report

## 2022-10-07 NOTE — TELEPHONE ENCOUNTER
Left message asking permission to contact [facility] to change report to include fetal sex  Her mother had called looking for that information however test was not ordered with the fetal sex disclosure as patient did not want to find out herself by looking in her MyChart  On message explained that if report is changed it will show up in her MyChart and she may view it  Also reviewed that ultrasound at the end of the month may also be able to provide us with the fetal sex information so she can wait until then if she does not want [facility] report changed  Provided genetic counseling phone number for her to call back

## 2022-10-11 PROBLEM — Z3A.17 17 WEEKS GESTATION OF PREGNANCY: Status: ACTIVE | Noted: 2022-08-23

## 2022-10-11 NOTE — PATIENT INSTRUCTIONS
Pregnancy at 15 to 18 Weeks   AMBULATORY CARE:   What changes are happening to your body:  Now that you are in your second trimester, you have more energy  You may also feel hungrier than usual  You may start to experience other symptoms, such as heartburn or dizziness  You may be gaining about ½ to 1 pound a week, and your pregnancy is beginning to show  You may need to start wearing maternity clothes  Seek care immediately if:   You have pain or cramping in your abdomen or low back  You have heavy vaginal bleeding or clotting  You pass material that looks like tissue or large clots  Collect the material and bring it with you  Call your doctor or obstetrician if:   You cannot keep food or drinks down, and you are losing weight  You have light bleeding  You have chills or a fever  You have vaginal itching, burning, or pain  You have yellow, green, white, or foul-smelling vaginal discharge  You have pain or burning when you urinate, less urine than usual, or pink or bloody urine  You have questions or concerns about your condition or care  How to care for yourself at this stage of your pregnancy:       Manage heartburn  by eating 4 or 5 small meals each day instead of large meals  Avoid spicy foods  Avoid eating right before bedtime  Manage nausea and vomiting  Avoid fatty and spicy foods  Eat small meals throughout the day instead of large meals  Kortney may help to decrease nausea  Ask your healthcare provider about other ways of decreasing nausea and vomiting  Eat a variety of healthy foods  Healthy foods include fruits, vegetables, whole-grain breads, low-fat dairy foods, beans, lean meats, and fish  Drink liquids as directed  Ask how much liquid to drink each day and which liquids are best for you  Limit caffeine to less than 200 milligrams each day  Limit your intake of fish to 2 servings each week   Choose fish low in mercury such as canned light tuna, shrimp, salmon, cod, or tilapia  Do not  eat fish high in mercury such as swordfish, tilefish, zachary mackerel, and shark  Take prenatal vitamins as directed  Your need for certain vitamins and minerals, such as folic acid, increases during pregnancy  Prenatal vitamins provide some of the extra vitamins and minerals you need  Prenatal vitamins may also help to decrease the risk of certain birth defects  Do not smoke  Smoking increases your risk of a miscarriage and other health problems during your pregnancy  Smoking can cause your baby to be born too early or weigh less at birth  Ask your healthcare provider for information if you need help quitting  Do not drink alcohol  Alcohol passes from your body to your baby through the placenta  It can affect your baby's brain development and cause fetal alcohol syndrome (FAS)  FAS is a group of conditions that causes mental, behavior, and growth problems  Talk to your healthcare provider before you take any medicines  Many medicines may harm your baby if you take them when you are pregnant  Do not take any medicines, vitamins, herbs, or supplements without first talking to your healthcare provider  Never use illegal or street drugs (such as marijuana or cocaine) while you are pregnant  Safety tips during pregnancy:   Avoid hot tubs and saunas  Do not use a hot tub or sauna while you are pregnant, especially during your first trimester  Hot tubs and saunas may raise your baby's temperature and increase the risk of birth defects  Avoid toxoplasmosis  This is an infection caused by eating raw meat or being around infected cat feces  It can cause birth defects, miscarriages, and other problems  Wash your hands after you touch raw meat  Make sure any meat is well-cooked before you eat it  Avoid raw eggs and unpasteurized milk  Use gloves or ask someone else to clean your cat's litter box while you are pregnant      Changes that are happening with your baby:  By 41 weeks, your baby may be about 6 inches long from the top of the head to the rump (baby's bottom)  Your baby may weigh about 11 ounces  You may be able to feel your baby's movement at about 18 weeks or later  The first movements may not be that noticeable  They may feel like a fluttering sensation  Your baby also makes sucking movements and can hear certain sounds  What you need to know about prenatal care:  During the first 28 weeks of your pregnancy, you will see your healthcare provider once a month  Your healthcare provider will check your blood pressure and weight  You may also need any of the following:  A urine test  may also be done to check for sugar and protein  These can be signs of gestational diabetes or infection  A blood test  may be done to check for anemia (low iron level)  Fundal height check  is a measurement of your uterus to check your baby's growth  This number is usually the same as the number of weeks that you have been pregnant  An ultrasound  may be done to check your baby's development  Your healthcare provider may be able to tell you what your baby's gender is during the ultrasound  Your baby's heart rate  will be checked  © Copyright [employer] 2022 Information is for End User's use only and may not be sold, redistributed or otherwise used for commercial purposes  All illustrations and images included in CareNotes® are the copyrighted property of [employer]  or [employer]  The above information is an  only  It is not intended as medical advice for individual conditions or treatments  Talk to your doctor, nurse or pharmacist before following any medical regimen to see if it is safe and effective for you

## 2022-10-13 ENCOUNTER — ROUTINE PRENATAL (OUTPATIENT)
Dept: OBGYN CLINIC | Age: 22
End: 2022-10-13

## 2022-10-13 VITALS — SYSTOLIC BLOOD PRESSURE: 108 MMHG | WEIGHT: 109 LBS | BODY MASS INDEX: 18.71 KG/M2 | DIASTOLIC BLOOD PRESSURE: 74 MMHG

## 2022-10-13 DIAGNOSIS — F41.9 ANXIETY AND DEPRESSION: ICD-10-CM

## 2022-10-13 DIAGNOSIS — F32.A ANXIETY AND DEPRESSION: ICD-10-CM

## 2022-10-13 DIAGNOSIS — Z34.02 ENCOUNTER FOR SUPERVISION OF NORMAL FIRST PREGNANCY IN SECOND TRIMESTER: Primary | ICD-10-CM

## 2022-10-13 DIAGNOSIS — Z3A.17 17 WEEKS GESTATION OF PREGNANCY: ICD-10-CM

## 2022-10-13 LAB
SL AMB  POCT GLUCOSE, UA: NORMAL
SL AMB POCT URINE PROTEIN: NORMAL

## 2022-10-13 PROCEDURE — PNV: Performed by: NURSE PRACTITIONER

## 2022-10-13 NOTE — PROGRESS NOTES
Problem   17 Weeks Gestation of Pregnancy    Blood Type:   Antibody-   Pap 09/13/2022 neg  GC/CT -  neg  PN1 Labs- reminded again to get them done with msAFP now  28 Week Labs-  COVID vaccine-   Flu vaccine - declined, advised precautions  Blue folder- Has  TWG- 4 536 kg (10 lb)      Genetic screening-  NIPS low risk  AFP- offered today  Level 1- 09/15/22  Level 2- 10/28/22    Yellow folder-  TDAP -   Rhogam? -   Delivery consent-  Breast pump - plans to breastfeed  Pediatrician -    Perineal massage -  GBS swab -   IOL -       17 weeks gestation of pregnancy  Feels well  Denies LOF/CTX/VB  Discussed fetal awareness  No concerns

## 2022-10-25 ENCOUNTER — APPOINTMENT (OUTPATIENT)
Dept: LAB | Facility: CLINIC | Age: 22
End: 2022-10-25
Payer: COMMERCIAL

## 2022-10-25 DIAGNOSIS — Z34.01 ENCOUNTER FOR SUPERVISION OF NORMAL FIRST PREGNANCY IN FIRST TRIMESTER: ICD-10-CM

## 2022-10-25 PROCEDURE — 87340 HEPATITIS B SURFACE AG IA: CPT

## 2022-10-25 PROCEDURE — 80307 DRUG TEST PRSMV CHEM ANLYZR: CPT

## 2022-10-25 PROCEDURE — 86592 SYPHILIS TEST NON-TREP QUAL: CPT

## 2022-10-25 PROCEDURE — 86803 HEPATITIS C AB TEST: CPT

## 2022-10-25 PROCEDURE — 36415 COLL VENOUS BLD VENIPUNCTURE: CPT

## 2022-10-25 PROCEDURE — 85025 COMPLETE CBC W/AUTO DIFF WBC: CPT

## 2022-10-25 PROCEDURE — 87389 HIV-1 AG W/HIV-1&-2 AB AG IA: CPT

## 2022-10-26 LAB
AMPHETAMINES SERPL QL SCN: NEGATIVE
BARBITURATES UR QL: NEGATIVE
BASOPHILS # BLD AUTO: 0.03 THOUSANDS/ÂΜL (ref 0–0.1)
BASOPHILS NFR BLD AUTO: 0 % (ref 0–1)
BENZODIAZ UR QL: NEGATIVE
COCAINE UR QL: NEGATIVE
EOSINOPHIL # BLD AUTO: 0.07 THOUSAND/ÂΜL (ref 0–0.61)
EOSINOPHIL NFR BLD AUTO: 1 % (ref 0–6)
ERYTHROCYTE [DISTWIDTH] IN BLOOD BY AUTOMATED COUNT: 12.1 % (ref 11.6–15.1)
HBV SURFACE AG SER QL: NORMAL
HCT VFR BLD AUTO: 34.2 % (ref 34.8–46.1)
HCV AB SER QL: NORMAL
HGB BLD-MCNC: 11.1 G/DL (ref 11.5–15.4)
IMM GRANULOCYTES # BLD AUTO: 0.04 THOUSAND/UL (ref 0–0.2)
IMM GRANULOCYTES NFR BLD AUTO: 1 % (ref 0–2)
LYMPHOCYTES # BLD AUTO: 1.74 THOUSANDS/ÂΜL (ref 0.6–4.47)
LYMPHOCYTES NFR BLD AUTO: 21 % (ref 14–44)
MCH RBC QN AUTO: 29.4 PG (ref 26.8–34.3)
MCHC RBC AUTO-ENTMCNC: 32.5 G/DL (ref 31.4–37.4)
MCV RBC AUTO: 91 FL (ref 82–98)
METHADONE UR QL: NEGATIVE
MONOCYTES # BLD AUTO: 0.75 THOUSAND/ÂΜL (ref 0.17–1.22)
MONOCYTES NFR BLD AUTO: 9 % (ref 4–12)
NEUTROPHILS # BLD AUTO: 5.87 THOUSANDS/ÂΜL (ref 1.85–7.62)
NEUTS SEG NFR BLD AUTO: 68 % (ref 43–75)
NRBC BLD AUTO-RTO: 0 /100 WBCS
OPIATES UR QL SCN: NEGATIVE
OXYCODONE+OXYMORPHONE UR QL SCN: NEGATIVE
PCP UR QL: NEGATIVE
PLATELET # BLD AUTO: 241 THOUSANDS/UL (ref 149–390)
PMV BLD AUTO: 10.4 FL (ref 8.9–12.7)
RBC # BLD AUTO: 3.78 MILLION/UL (ref 3.81–5.12)
RPR SER QL: NORMAL
THC UR QL: NEGATIVE
WBC # BLD AUTO: 8.5 THOUSAND/UL (ref 4.31–10.16)

## 2022-10-27 LAB — HIV 1+2 AB+HIV1 P24 AG SERPL QL IA: NORMAL

## 2022-10-28 ENCOUNTER — ROUTINE PRENATAL (OUTPATIENT)
Dept: PERINATAL CARE | Facility: OTHER | Age: 22
End: 2022-10-28
Payer: COMMERCIAL

## 2022-10-28 VITALS
WEIGHT: 115 LBS | SYSTOLIC BLOOD PRESSURE: 100 MMHG | DIASTOLIC BLOOD PRESSURE: 60 MMHG | HEIGHT: 64 IN | BODY MASS INDEX: 19.63 KG/M2 | HEART RATE: 78 BPM

## 2022-10-28 DIAGNOSIS — Z36.3 ENCOUNTER FOR ANTENATAL SCREENING FOR MALFORMATION: Primary | ICD-10-CM

## 2022-10-28 DIAGNOSIS — Z36.86 ENCOUNTER FOR ANTENATAL SCREENING FOR CERVICAL LENGTH: ICD-10-CM

## 2022-10-28 DIAGNOSIS — Z3A.20 20 WEEKS GESTATION OF PREGNANCY: ICD-10-CM

## 2022-10-28 PROCEDURE — 76817 TRANSVAGINAL US OBSTETRIC: CPT | Performed by: OBSTETRICS & GYNECOLOGY

## 2022-10-28 PROCEDURE — 76805 OB US >/= 14 WKS SNGL FETUS: CPT | Performed by: OBSTETRICS & GYNECOLOGY

## 2022-10-28 NOTE — PROGRESS NOTES
OFFICE CONSULT  [facility]  [address] [facility],  [facility] [address]     Dear Dr Lyudmila Arias     Thank you for requesting a  consultation on your patient Can Maki for the following indications:  Fetal anatomical survey  She completed NIPT which was normal   MSAFP was not completed by today's visit  History  She does not report a significant medical history in her questionaire  In her epic chart she reported to have anxiety and depression and history of marijuana use and alcohol before pregnancy  She reports she has no depression at this time and does have intermittent anxiety which she is on no medication for and she declined any referral for behavioral counseling  She reports she has stopped both the use of alcohol and marijuana in pregnancy  She has no surgical history  She is on prenatal vitamins and has no allergies  There is no significant 1st generation family history of hypertension, diabetes, thrombosis or congenital anomalies  She completed the COVID vaccine in   Ultrasound findings: The ultrasound shows a fetus that is growing concordant with her dates  No malformations are detected  Views of the spine, aortic arch and cardiac septum are limited secondary to fetal position  Views of the cerebellum are normal lowering the risk for missed spinal defect significantly  The patient was informed of the findings and counseled about the limitations of the exam in detecting all forms of fetal congenital abnormalities  She does not report any vaginal bleeding or uterine cramping/contractions  She does feel fetal movement  Counseling:  I offered to order the blood work test for spina bifida but counseled that her ultrasound today showed very normal cranial anatomy which rules at 95 percent of babies with spina bifida  In the end she declined any further testing  I encourage the COVID vaccine booster and the flu shot in pregnancy  She reports that she had a COVID infection outside of pregnancy which will be boost her immunity and she is not interested in completing a COVID vaccine booster in pregnancy  Follow up recommended:   Recommend a follow-up ultrasound at 32 weeks for growth and missed anatomy  The majority of time (greater then 50%) was spent on counseling and coordination of care of this patient and /or family member  Approximate face to face time was 15 minutes            Monika Reyes

## 2022-10-28 NOTE — LETTER
2022     Holly Hand, [address] [place] [address]    Patient: Colin Williamson   YOB: 2000   Date of Visit: 10/28/2022       Dear Dr Eli Hager: Thank you for referring Colin Williamson to me for evaluation  Below are my notes for this consultation  If you have questions, please do not hesitate to call me  I look forward to following your patient along with you  Sincerely,        Osmin Interiano MD        CC: No Recipients  Osmin Interiano MD  10/28/2022 10:23 PM  Sign when Signing Visit  OFFICE 1 [facility]  [address]     Dear Dr Eli Hager     Thank you for requesting a  consultation on your patient Colin Williamson for the following indications:  Fetal anatomical survey  She completed NIPT which was normal   MSAFP was not completed by today's visit  History  She does not report a significant medical history in her questionaire  In her epic chart she reported to have anxiety and depression and history of marijuana use and alcohol before pregnancy  She reports she has no depression at this time and does have intermittent anxiety which she is on no medication for and she declined any referral for behavioral counseling  She reports she has stopped both the use of alcohol and marijuana in pregnancy  She has no surgical history  She is on prenatal vitamins and has no allergies  There is no significant 1st generation family history of hypertension, diabetes, thrombosis or congenital anomalies  She completed the COVID vaccine in   Ultrasound findings: The ultrasound shows a fetus that is growing concordant with her dates  No malformations are detected  Views of the spine, aortic arch and cardiac septum are limited secondary to fetal position    Views of the cerebellum are normal lowering the risk for missed spinal defect significantly  The patient was informed of the findings and counseled about the limitations of the exam in detecting all forms of fetal congenital abnormalities  She does not report any vaginal bleeding or uterine cramping/contractions  She does feel fetal movement  Counseling:  I offered to order the blood work test for spina bifida but counseled that her ultrasound today showed very normal cranial anatomy which rules at 95 percent of babies with spina bifida  In the end she declined any further testing  I encourage the COVID vaccine booster and the flu shot in pregnancy  She reports that she had a COVID infection outside of pregnancy which will be boost her immunity and she is not interested in completing a COVID vaccine booster in pregnancy  Follow up recommended:   Recommend a follow-up ultrasound at 32 weeks for growth and missed anatomy  The majority of time (greater then 50%) was spent on counseling and coordination of care of this patient and /or family member  Approximate face to face time was 15 minutes            Sheila Franco

## 2022-11-10 ENCOUNTER — ROUTINE PRENATAL (OUTPATIENT)
Dept: OBGYN CLINIC | Age: 22
End: 2022-11-10

## 2022-11-10 VITALS
DIASTOLIC BLOOD PRESSURE: 74 MMHG | SYSTOLIC BLOOD PRESSURE: 112 MMHG | WEIGHT: 118 LBS | BODY MASS INDEX: 20.14 KG/M2 | HEIGHT: 64 IN

## 2022-11-10 DIAGNOSIS — Z3A.21 21 WEEKS GESTATION OF PREGNANCY: Primary | ICD-10-CM

## 2022-11-10 NOTE — PROGRESS NOTES
Patient is here for prenatal ob visit today  No question's or concerns at this time  GA: 21w6d  Estimated Date of Delivery: 3/17/23    Urine: Protein Negative / Glucose Negative   Denies loss of fluid, vaginal bleeding and contractions  Good fetal movement  Pn1 labs are completed and up to date    Flu vaccine will be done at work

## 2022-11-10 NOTE — ASSESSMENT & PLAN NOTE
Shania Troncoso is a 25 y o  Veleria Likens here for routine prenatal care  Prepregnancy BMI 16 98 with a goal weight gain 12 5 kg (27 lb)-18 kg (39 lb)  TWG 8 618 kg (19 lb)  Feels well  Denies LOF/CTX/VB  No concerns  +FM    Blood type, rubella and urine culture not complete with pn1 labs- ordered today  Plans to get flu vaccine at work  Having a boy! Anatomy scan with MFM showed normal growth  Declined msAFP  FG scan at 32 weeks   labor precautions reviewed  Encouraged adequate hydration and nutrition  Pregnancy Essential guide and [facility] web site recommended

## 2022-11-10 NOTE — PATIENT INSTRUCTIONS
Pregnancy at 23 to 22 Weeks   AMBULATORY CARE:   What changes are happening with your body:  Now that you are in your second trimester, you have more energy  You may also be feeling hungrier than usual  You may be gaining about ½ to 1 pound a week, and your pregnancy is beginning to show  You may need to start wearing maternity clothes  As your baby gets larger, you may have other symptoms  These may include body aches or stretch marks on your abdomen, breasts, thighs, or buttocks  Seek care immediately if:   You develop a severe headache that does not go away  You have new or increased vision changes, such as blurred or spotted vision  You have new or increased swelling in your face or hands  You have vaginal spotting or bleeding  Your water broke or you feel warm water gushing or trickling from your vagina  Call your doctor or obstetrician if:   You have abdominal cramps, pressure, or tightening  You have a change in vaginal discharge  You cannot keep food or drinks down, and you are losing weight  You have chills or a fever  You have vaginal itching, burning, or pain  You have yellow, green, white, or foul-smelling vaginal discharge  You have pain or burning when you urinate, less urine than usual, or pink or bloody urine  You have questions or concerns about your condition or care  How to care for yourself at this stage of your pregnancy:       Eat a variety of healthy foods  Healthy foods include fruits, vegetables, whole-grain breads, low-fat dairy foods, beans, lean meats, and fish  Drink liquids as directed  Ask how much liquid to drink each day and which liquids are best for you  Limit caffeine to less than 200 milligrams each day  Limit your intake of fish to 2 servings each week  Choose fish low in mercury such as canned light tuna, shrimp, salmon, cod, or tilapia  Do not  eat fish high in mercury such as swordfish, tilefish, zachary mackerel, and shark           Take prenatal vitamins as directed  Your need for certain vitamins and minerals, such as folic acid, increases during pregnancy  Prenatal vitamins provide some of the extra vitamins and minerals you need  Prenatal vitamins may also help to decrease the risk of certain birth defects  Talk to your healthcare provider about exercise  Moderate exercise can help you stay fit  Your healthcare provider will help you plan an exercise program that is safe for you during pregnancy  Do not smoke  Smoking increases your risk of a miscarriage and other health problems during your pregnancy  Smoking can cause your baby to be born too early or weigh less at birth  Ask your healthcare provider for information if you need help quitting  Do not drink alcohol  Alcohol passes from your body to your baby through the placenta  It can affect your baby's brain development and cause fetal alcohol syndrome (FAS)  FAS is a group of conditions that causes mental, behavior, and growth problems  Talk to your healthcare provider before you take any medicines  Many medicines may harm your baby if you take them when you are pregnant  Do not take any medicines, vitamins, herbs, or supplements without first talking to your healthcare provider  Never use illegal or street drugs (such as marijuana or cocaine) while you are pregnant  Safety tips during pregnancy:   Avoid hot tubs and saunas  Do not use a hot tub or sauna while you are pregnant, especially during your first trimester  Hot tubs and saunas may raise your baby's temperature and increase the risk of birth defects  Avoid toxoplasmosis  This is an infection caused by eating raw meat or being around infected cat feces  It can cause birth defects, miscarriages, and other problems  Wash your hands after you touch raw meat  Make sure any meat is well-cooked before you eat it  Avoid raw eggs and unpasteurized milk   Use gloves or ask someone else to clean your cat's litter box while you are pregnant  Changes happening with your baby:  By 22 weeks, your baby is about 8 inches long from the top of the head to the rump (baby's bottom)  Your baby also weighs about 1 pound  Your baby is becoming much more active  You may be able to feel the baby move inside you now  The first movements may not be that noticeable  They may feel like a fluttering sensation  As time goes on, your baby's movements will become stronger and more noticeable  What you need to know about prenatal care:  During the first 28 weeks of your pregnancy, you will see your healthcare provider once a month  Your healthcare provider will check your blood pressure and weight  You may also need the following:  A urine test  may also be done to check for sugar and protein  These can be signs of gestational diabetes or infection  Protein in your urine may also be a sign of preeclampsia  Preeclampsia is a condition that can develop during week 20 or later of your pregnancy  It causes high blood pressure, and it can cause problems with your kidneys and other organs  Fundal height  is a measurement of your uterus to check your baby's growth  This number is usually the same as the number of weeks that you have been pregnant  A fetal ultrasound  shows pictures of your baby inside your uterus  It shows your baby's development  The movement and position of your baby can also be seen  Your healthcare provider may be able to tell you what your baby's gender is during the ultrasound  Your baby's heart rate  will be checked  Follow up with your obstetrician as directed:  Write down your questions so you remember to ask them during your visits  © [employer] 2022 Information is for End User's use only and may not be sold, redistributed or otherwise used for commercial purposes   All illustrations and images included in CareNotes® are the copyrighted property of A D A M , Inc  or [employer] [facility]  The above information is an  only  It is not intended as medical advice for individual conditions or treatments  Talk to your doctor, nurse or pharmacist before following any medical regimen to see if it is safe and effective for you  Valuable Online Resource:    [facility] pregnancy essential guide    http://nicole sethi/      On the right side of the screen is a 50 page guide providing valuable information about your entire pregnancy  On the left hand side of the site you will see several other links to great information and resources that [facility] offers     If you click on the tab that says "Pregnancy and Birth Packet" this opens another  guide to labor and delivery information as well as breast feeding information,  care, pediatricians, car seat safety and much more     The [facility] and [address] tab has a virtual tour of the new L&D unit, as well as valuable information about classes that are offered, breast feeding support, support groups and much more  I highly recommend the virtual Breast Feeding class, very informational even if you have breast fed in the past  Check for available dates ! Click around and enjoy all we have to offer!     Please note that all information in regards to locations and visiting hours have not been updated due to [address] [facility] [facility]t delivery location is [address] [place] [address],[address] [facility] [place] [address] [address] [address] [place] [address]

## 2022-11-10 NOTE — PROGRESS NOTES
Problem   21 Weeks Gestation of Pregnancy    Blood Type:  Pap 2022 neg  GC/CT -  neg  PN1 Labs- mild anemia  Missing T&S and rubella   28 Week Labs-  COVID vaccine-   Flu vaccine - declined, advised precautions  Blue folder- Has  TWG- 4 536 kg (10 lb)      Genetic screening-  NIPS low risk  AFP- declined  Level 1- 09/15/22  Level 2- 10/28/22    Yellow folder-  TDAP -   Rhogam? -   Delivery consent-  Breast pump - plans to breastfeed  Pediatrician -    Perineal massage -  GBS swab -   IOL -       21 weeks gestation of pregnancy  Kandace Jennings is a 25 y o  [place] here for routine prenatal care  Prepregnancy BMI 16 98 with a goal weight gain 12 5 kg (27 lb)-18 kg (39 lb)  TWG 8 618 kg (19 lb)  Feels well  Denies LOF/CTX/VB  No concerns  +FM    Blood type, rubella and urine culture not complete with pn1 labs- ordered today  Plans to get flu vaccine at work  Having a boy! Anatomy scan with MFM showed normal growth  Declined msAFP  FG scan at 32 weeks   labor precautions reviewed  Encouraged adequate hydration and nutrition  Pregnancy Essential guide and [facility] center web site recommended

## 2022-11-14 ENCOUNTER — TELEPHONE (OUTPATIENT)
Dept: OBGYN CLINIC | Facility: CLINIC | Age: 22
End: 2022-11-14

## 2022-11-14 NOTE — TELEPHONE ENCOUNTER
Pt had r abdominal pain when she got up this AM - and also went down r leg  Sx left within 10 min  Probable round ligament pain  Heat and tylenol

## 2022-12-07 PROBLEM — Z3A.25 25 WEEKS GESTATION OF PREGNANCY: Status: ACTIVE | Noted: 2022-08-23

## 2022-12-07 PROBLEM — Z34.02 ENCOUNTER FOR SUPERVISION OF NORMAL FIRST PREGNANCY IN SECOND TRIMESTER: Status: ACTIVE | Noted: 2022-12-07

## 2022-12-07 NOTE — PATIENT INSTRUCTIONS
Pregnancy at 23 to 26 Weeks   AMBULATORY CARE:   What changes are happening to your body: You are now close to or at the beginning of the third trimester  The third trimester starts at 24 weeks and ends with delivery  As your baby gets larger, you may develop certain symptoms  These may include pain in your back or down the sides of your abdomen  You may also have stretch marks on your abdomen, breasts, thighs, or buttocks  You may also have constipation  Seek care immediately if:   You develop a severe headache that does not go away  You have new or increased vision changes, such as blurred or spotted vision  You have new or increased swelling in your face or hands  You have vaginal spotting or bleeding  Your water broke or you feel warm water gushing or trickling from your vagina  Call your doctor or obstetrician if:   You have abdominal cramps, pressure, or tightening  You have a change in vaginal discharge  You have light bleeding  You have chills or a fever  You have vaginal itching, burning, or pain  You have yellow, green, white, or foul-smelling vaginal discharge  You have pain or burning when you urinate, less urine than usual, or pink or bloody urine  You have questions or concerns about your condition or care  How to care for yourself at this stage of your pregnancy:       Eat a variety of healthy foods  Healthy foods include fruits, vegetables, whole-grain breads, low-fat dairy foods, beans, lean meats, and fish  Drink liquids as directed  Ask how much liquid to drink each day and which liquids are best for you  Limit caffeine to less than 200 milligrams each day  Limit your intake of fish to 2 servings each week  Choose fish low in mercury such as canned light tuna, shrimp, salmon, cod, or tilapia  Do not  eat fish high in mercury such as swordfish, tilefish, zachary mackerel, and shark  Manage back pain    Do not stand for long periods of time or lift heavy items  Use good posture while you stand, squat, or bend  Wear low-heeled shoes with good support  Rest may also help to relieve back pain  Ask your healthcare provider about exercises you can do to strengthen your back muscles  Take prenatal vitamins as directed  Your need for certain vitamins and minerals, such as folic acid, increases during pregnancy  Prenatal vitamins provide some of the extra vitamins and minerals you need  Prenatal vitamins may also help to decrease the risk of certain birth defects  Talk to your healthcare provider about exercise  Moderate exercise can help you stay fit  Your healthcare provider will help you plan an exercise program that is safe for you during pregnancy  Do not smoke  Smoking increases your risk of a miscarriage and other health problems during your pregnancy  Smoking can cause your baby to be born too early or weigh less at birth  Ask your healthcare provider for information if you need help quitting  Do not drink alcohol  Alcohol passes from your body to your baby through the placenta  It can affect your baby's brain development and cause fetal alcohol syndrome (FAS)  FAS is a group of conditions that causes mental, behavior, and growth problems  Talk to your healthcare provider before you take any medicines  Many medicines may harm your baby if you take them when you are pregnant  Do not take any medicines, vitamins, herbs, or supplements without first talking to your healthcare provider  Never use illegal or street drugs (such as marijuana or cocaine) while you are pregnant  Safety tips during pregnancy:   Avoid hot tubs and saunas  Do not use a hot tub or sauna while you are pregnant, especially during your first trimester  Hot tubs and saunas may raise your baby's temperature and increase the risk of birth defects  Avoid toxoplasmosis  This is an infection caused by eating raw meat or being around infected cat feces   It can cause birth defects, miscarriages, and other problems  Wash your hands after you touch raw meat  Make sure any meat is well-cooked before you eat it  Avoid raw eggs and unpasteurized milk  Use gloves or ask someone else to clean your cat's litter box while you are pregnant  Changes that are happening with your baby:  By 26 weeks, your baby will weigh about 2 pounds  Your baby will be about 10 inches long from the top of the head to the rump (baby's bottom)  Your baby's movements are much stronger now  Your baby's eyes are almost completely formed and can partially open  Your baby also sleeps and wakes up  What you need to know about prenatal care: Your healthcare provider will check your blood pressure and weight  You may also need the following:  A urine test  may also be done to check for sugar and protein  These can be signs of gestational diabetes or infection  Protein in your urine may also be a sign of preeclampsia  Preeclampsia is a condition that can develop during week 20 or later of your pregnancy  It causes high blood pressure, and it can cause problems with your kidneys and other organs  A gestational diabetes screen  may be done  Your healthcare provider may order either a 1-step or 2-step oral glucose tolerance test (OGTT)  1-step OGTT:  Your blood sugar level will be tested after you have not eaten for 8 hours (fasting)  You will then be given a glucose drink  Your level will be tested again 1 hour and 2 hours after you finish the drink  2-step OGTT:  You do not have to fast for the first part of the test  You will have the glucose drink at any time of day  Your blood sugar level will be checked 1 hour later  If your blood sugar is higher than a certain level, another test will be ordered  You will fast and your blood sugar level will be tested  You will have the glucose drink  Your blood will be tested again 1 hour, 2 hours, and 3 hours after you finish the glucose drink      Fundal height is a measurement of your uterus to check your baby's growth  This number is usually the same as the number of weeks that you have been pregnant  Your baby's heart rate  will be checked  Follow up with your doctor or obstetrician as directed:  Write down your questions so you remember to ask them during your visits  © Copyright [employer] 2022 Information is for End User's use only and may not be sold, redistributed or otherwise used for commercial purposes  All illustrations and images included in CareNotes® are the copyrighted property of [employer]  or [facility] Esmer Lundy   The above information is an  only  It is not intended as medical advice for individual conditions or treatments  Talk to your doctor, nurse or pharmacist before following any medical regimen to see if it is safe and effective for you

## 2022-12-08 ENCOUNTER — ROUTINE PRENATAL (OUTPATIENT)
Dept: OBGYN CLINIC | Age: 22
End: 2022-12-08

## 2022-12-08 VITALS — DIASTOLIC BLOOD PRESSURE: 78 MMHG | SYSTOLIC BLOOD PRESSURE: 108 MMHG | BODY MASS INDEX: 21.28 KG/M2 | WEIGHT: 124 LBS

## 2022-12-08 DIAGNOSIS — Z34.02 ENCOUNTER FOR SUPERVISION OF NORMAL FIRST PREGNANCY IN SECOND TRIMESTER: Primary | ICD-10-CM

## 2022-12-08 DIAGNOSIS — Z3A.25 25 WEEKS GESTATION OF PREGNANCY: ICD-10-CM

## 2022-12-08 NOTE — PROGRESS NOTES
Pt is feeling fetal movement   No LOF or vaginal bleeding Flu vaccine received 11/15/22 at [address] [place] [address]

## 2022-12-08 NOTE — ASSESSMENT & PLAN NOTE
Feels well  Denies LOF/CTX/VB  Discussed fetal awareness  No concerns  Will get her outstanding labs done with her 28 week ones

## 2022-12-08 NOTE — PROGRESS NOTES
Problem   25 Weeks Gestation of Pregnancy    Blood Type:  Pap 09/13/2022 neg  GC/CT -  neg  PN1 Labs- mild anemia  Missing T&S, urine cx and rubella   28 Week Labs- slip given  COVID vaccine-   Flu vaccine - received at work  [employer]- Has  TWG- 11 3 kg (25 lb)      Genetic screening-  NIPS low risk  AFP- declined  Level 1- 09/15/22  Level 2- 10/28/22  FG on 1/20    Yellow folder-  TDAP -   Rhogam? -   Delivery consent-  Breast pump - plans to breastfeed  Pediatrician -    Perineal massage -  GBS swab -   IOL -       Feels well  Denies LOF/CTX/VB  Discussed fetal awareness  No concerns

## 2022-12-27 ENCOUNTER — ROUTINE PRENATAL (OUTPATIENT)
Dept: OBGYN CLINIC | Age: 22
End: 2022-12-27

## 2022-12-27 ENCOUNTER — APPOINTMENT (OUTPATIENT)
Dept: LAB | Facility: HOSPITAL | Age: 22
End: 2022-12-27

## 2022-12-27 VITALS
DIASTOLIC BLOOD PRESSURE: 64 MMHG | BODY MASS INDEX: 22.09 KG/M2 | SYSTOLIC BLOOD PRESSURE: 108 MMHG | HEIGHT: 64 IN | WEIGHT: 129.4 LBS

## 2022-12-27 DIAGNOSIS — Z3A.21 21 WEEKS GESTATION OF PREGNANCY: ICD-10-CM

## 2022-12-27 DIAGNOSIS — Z3A.28 28 WEEKS GESTATION OF PREGNANCY: ICD-10-CM

## 2022-12-27 DIAGNOSIS — Z34.82 ENCOUNTER FOR SUPERVISION OF OTHER NORMAL PREGNANCY, SECOND TRIMESTER: Primary | ICD-10-CM

## 2022-12-27 DIAGNOSIS — Z3A.25 25 WEEKS GESTATION OF PREGNANCY: ICD-10-CM

## 2022-12-27 LAB
ABO GROUP BLD: NORMAL
BASOPHILS # BLD AUTO: 0.02 THOUSANDS/ÂΜL (ref 0–0.1)
BASOPHILS NFR BLD AUTO: 0 % (ref 0–1)
BLD GP AB SCN SERPL QL: NEGATIVE
DME PARACHUTE DELIVERY DATE REQUESTED: NORMAL
DME PARACHUTE ITEM DESCRIPTION: NORMAL
DME PARACHUTE ORDER STATUS: NORMAL
DME PARACHUTE SUPPLIER NAME: NORMAL
DME PARACHUTE SUPPLIER PHONE: NORMAL
EOSINOPHIL # BLD AUTO: 0.13 THOUSAND/ÂΜL (ref 0–0.61)
EOSINOPHIL NFR BLD AUTO: 2 % (ref 0–6)
ERYTHROCYTE [DISTWIDTH] IN BLOOD BY AUTOMATED COUNT: 12.1 % (ref 11.6–15.1)
GLUCOSE 1H P 50 G GLC PO SERPL-MCNC: 110 MG/DL (ref 40–134)
HCT VFR BLD AUTO: 32.8 % (ref 34.8–46.1)
HGB BLD-MCNC: 10.7 G/DL (ref 11.5–15.4)
IMM GRANULOCYTES # BLD AUTO: 0.05 THOUSAND/UL (ref 0–0.2)
IMM GRANULOCYTES NFR BLD AUTO: 1 % (ref 0–2)
LYMPHOCYTES # BLD AUTO: 1.34 THOUSANDS/ÂΜL (ref 0.6–4.47)
LYMPHOCYTES NFR BLD AUTO: 17 % (ref 14–44)
MCH RBC QN AUTO: 29.3 PG (ref 26.8–34.3)
MCHC RBC AUTO-ENTMCNC: 32.6 G/DL (ref 31.4–37.4)
MCV RBC AUTO: 90 FL (ref 82–98)
MONOCYTES # BLD AUTO: 0.94 THOUSAND/ÂΜL (ref 0.17–1.22)
MONOCYTES NFR BLD AUTO: 12 % (ref 4–12)
NEUTROPHILS # BLD AUTO: 5.62 THOUSANDS/ÂΜL (ref 1.85–7.62)
NEUTS SEG NFR BLD AUTO: 68 % (ref 43–75)
NRBC BLD AUTO-RTO: 0 /100 WBCS
PLATELET # BLD AUTO: 202 THOUSANDS/UL (ref 149–390)
PMV BLD AUTO: 9.8 FL (ref 8.9–12.7)
RBC # BLD AUTO: 3.65 MILLION/UL (ref 3.81–5.12)
RH BLD: POSITIVE
RUBV IGG SERPL IA-ACNC: 95.7 IU/ML
SL AMB  POCT GLUCOSE, UA: NEGATIVE
SL AMB POCT URINE PROTEIN: NEGATIVE
SPECIMEN EXPIRATION DATE: NORMAL
WBC # BLD AUTO: 8.1 THOUSAND/UL (ref 4.31–10.16)

## 2022-12-27 NOTE — PROGRESS NOTES
Pt is here for routine ob visit   No concerns at this time  Urine neg/neg   No LOF,VB,Contractions   +FM   28wk Labs not completed   UTD flu/covid vaccines   Declined Tdap today/offer next visit   Yellow folder given/reviewed w/ pt  Delivery Consent not signed today  Breast Pump ordered   Peds Referral placed

## 2022-12-27 NOTE — PROGRESS NOTES
25 y o   at 33w3d, here for routine OB visit  Feeling well overall and without concerns  Good FM  Denies LOF, VB, contractions  Denies dysuria, hematuria  Problem   28 Weeks Gestation of Pregnancy    Blood Type: unknown  PN1 Labs- mild anemia  Missing T&S, urine Cx and rubella   28 Week Labs AND above are incomplete, reinforced importance of going for this testing today (right now) so that we can get results, schedule rhogam injection etc  Reviewed pathophysiology of Rh isoimmunization and effect on future pregnancies if Rh negative and sensitization event occurs  - S/p tdap/COVID    - TWG- 11 3 kg (25 lb)  - TDAP - wants next visit  - Rhogam - unsure if necessary as testing incomplete  - Delivery consent- given above, will complete at upcoming visit   Provided to patient for review           Problem List Items Addressed This Visit        Other    28 weeks gestation of pregnancy   Other Visit Diagnoses     Encounter for supervision of other normal pregnancy, second trimester    -  Primary    Relevant Orders    POCT urine dip (Completed)    Ambulatory Referral to Pediatrics

## 2022-12-28 LAB — RPR SER QL: NORMAL

## 2022-12-29 ENCOUNTER — TELEPHONE (OUTPATIENT)
Dept: OBGYN CLINIC | Facility: CLINIC | Age: 22
End: 2022-12-29

## 2022-12-29 DIAGNOSIS — R82.71 ASYMPTOMATIC BACTERIURIA DURING PREGNANCY IN SECOND TRIMESTER: Primary | ICD-10-CM

## 2022-12-29 DIAGNOSIS — O99.891 ASYMPTOMATIC BACTERIURIA DURING PREGNANCY IN SECOND TRIMESTER: Primary | ICD-10-CM

## 2022-12-29 RX ORDER — NITROFURANTOIN 25; 75 MG/1; MG/1
100 CAPSULE ORAL 2 TIMES DAILY
Qty: 14 CAPSULE | Refills: 0 | Status: SHIPPED | OUTPATIENT
Start: 2022-12-29 | End: 2023-01-05

## 2022-12-29 NOTE — TELEPHONE ENCOUNTER
----- Message from Pubsteragatha Fontselfal sent at 12/29/2022 12:00 PM EST -----  Regarding: Short of breathe/ sciatica  Contact: [phone]  Hello, during work I have been becoming out of breathe doing the simplest things and the bending is making my sciatic pain way worse  Is there anything I can do to alleviate these symptoms? Thank you!

## 2022-12-30 LAB
BACTERIA UR CULT: ABNORMAL
BACTERIA UR CULT: ABNORMAL

## 2023-01-12 ENCOUNTER — ROUTINE PRENATAL (OUTPATIENT)
Dept: OBGYN CLINIC | Age: 23
End: 2023-01-12

## 2023-01-12 VITALS
HEIGHT: 64 IN | WEIGHT: 131.2 LBS | SYSTOLIC BLOOD PRESSURE: 106 MMHG | BODY MASS INDEX: 22.4 KG/M2 | DIASTOLIC BLOOD PRESSURE: 54 MMHG

## 2023-01-12 DIAGNOSIS — Z23 NEED FOR TDAP VACCINATION: ICD-10-CM

## 2023-01-12 DIAGNOSIS — Z3A.30 30 WEEKS GESTATION OF PREGNANCY: ICD-10-CM

## 2023-01-12 DIAGNOSIS — Z34.93 PRENATAL CARE IN THIRD TRIMESTER: Primary | ICD-10-CM

## 2023-01-12 PROBLEM — R10.9 ABDOMINAL CRAMPING AFFECTING PREGNANCY: Status: ACTIVE | Noted: 2023-01-07

## 2023-01-12 PROBLEM — O26.899 ABDOMINAL CRAMPING AFFECTING PREGNANCY: Status: ACTIVE | Noted: 2023-01-07

## 2023-01-12 LAB
SL AMB  POCT GLUCOSE, UA: NORMAL
SL AMB POCT URINE PROTEIN: NORMAL

## 2023-01-12 NOTE — PROGRESS NOTES
Problem   Abdominal Cramping Affecting Pregnancy   30 Weeks Gestation of Pregnancy    Blood Type: O positive  PN1 Labs- mild anemia  28 Week Labs - mild anemia  Passed glucose test    - S/p tdap/COVID    - TWG- 13 8 kg (30 lb 6 4 oz)  - Delivery consent- signed          30 weeks gestation of pregnancy  Roselia Oneil is a 25 y o  Mary Gathers who presents for routine PNV  Prepregnancy BMI 16 98 with a goal weight gain 12 5 kg (27 lb)-18 kg (39 lb)  TWG 13 8 kg (30 lb 6 4 oz)  Denies OB complaints  Good fetal movement  Denies contractions, cramping, leakage of fluid or vaginal bleeding  The patient was counseled about the labor process  She was counseled about the possible need for operative delivery using the vacuum or forceps and the indications for doing so  She was counseled that there is a small risk of  complications including intracranial hemorrhage, lacerations, nerve damage or fracture as well as the increased risk for more severe maternal laceration  She was counseled regarding potential reasons that she may need a  section including arrest of dilation/descent, non-reassuring fetal status, or worsening maternal status  She was counseled on the risks of  including bleeding, infection, and injury to surrounding structures including the bowel and bladder  She was counseled that there are medical and surgical methods to manage excessive postpartum bleeding  She was counseled that in the event of excessive blood loss, she may require blood transfusion which includes a small risk of blood borne diseases such as hepatitis and HIV  The patient is OK with receiving a blood transfusion if necessary  The patient signed consent  tdap completed today  Reviewed  labor precautions and FKCs  Advised to start Perineal massage     Pregnancy Essential guide and Baby and Me web site recommended

## 2023-01-12 NOTE — PATIENT INSTRUCTIONS
Pregnancy at 31 to 34 Weeks   AMBULATORY CARE:   Changes happening with your body: You may continue to have symptoms such as shortness of breath, heartburn, contractions, or swelling of your ankles and feet  You may be gaining about 1 pound a week now  Seek care immediately if:   You develop a severe headache that does not go away  You have new or increased vision changes, such as blurred or spotted vision  You have new or increased swelling in your face or hands  You have vaginal spotting or bleeding  Your water broke or you feel warm water gushing or trickling from your vagina  Call your obstetrician if:   You have more than 5 contractions in 1 hour  You notice any changes in your baby's movements  You have abdominal cramps, pressure, or tightening  You have a change in vaginal discharge  You have chills or a fever  You have vaginal itching, burning, or pain  You have yellow, green, white, or foul-smelling vaginal discharge  You have pain or burning when you urinate, less urine than usual, or pink or bloody urine  You have questions or concerns about your condition or care  How to care for yourself at this stage of your pregnancy:       Eat a variety of healthy foods  Healthy foods include fruits, vegetables, whole-grain breads, low-fat dairy foods, beans, lean meats, and fish  Drink liquids as directed  Ask how much liquid to drink each day and which liquids are best for you  Limit caffeine to less than 200 milligrams each day  Limit your intake of fish to 2 servings each week  Choose fish low in mercury such as canned light tuna, shrimp, salmon, cod, or tilapia  Do not  eat fish high in mercury such as swordfish, tilefish, zachary mackerel, and shark  Manage heartburn  by eating 4 or 5 small meals each day instead of large meals  Avoid spicy food  Manage swelling  by lying down and putting your feet up  Take prenatal vitamins as directed    Your need for certain vitamins and minerals, such as folic acid, increases during pregnancy  Prenatal vitamins provide some of the extra vitamins and minerals you need  Prenatal vitamins may also help to decrease the risk of certain birth defects  Talk to your healthcare provider about exercise  Moderate exercise can help you stay fit  Your healthcare provider will help you plan an exercise program that is safe for you during pregnancy  Do not smoke  Smoking increases your risk of a miscarriage and other health problems during your pregnancy  Smoking can cause your baby to be born too early or weigh less at birth  Ask your healthcare provider for information if you need help quitting  Do not drink alcohol  Alcohol passes from your body to your baby through the placenta  It can affect your baby's brain development and cause fetal alcohol syndrome (FAS)  FAS is a group of conditions that causes mental, behavior, and growth problems  Talk to your healthcare provider before you take any medicines  Many medicines may harm your baby if you take them when you are pregnant  Do not take any medicines, vitamins, herbs, or supplements without first talking to your healthcare provider  Never use illegal or street drugs (such as marijuana or cocaine) while you are pregnant  Safety tips during pregnancy:   Avoid hot tubs and saunas  Do not use a hot tub or sauna while you are pregnant, especially during your first trimester  Hot tubs and saunas may raise your baby's temperature and increase the risk of birth defects  Avoid toxoplasmosis  This is an infection caused by eating raw meat or being around infected cat feces  It can cause birth defects, miscarriages, and other problems  Wash your hands after you touch raw meat  Make sure any meat is well-cooked before you eat it  Avoid raw eggs and unpasteurized milk  Use gloves or ask someone else to clean your cat's litter box while you are pregnant  Changes happening with your baby:  By 34 weeks, your baby may weigh more than 5 pounds  Your baby will be about 12 ½ inches long from the top of the head to the rump (baby's bottom)  Your baby is gaining about ½ pound a week  Your baby's eyes open and close now  Your baby's kicks and movements are more forceful at this time  What you need to know about prenatal care: Your healthcare provider will check your blood pressure and weight  You may also need the following:  A urine test  may also be done to check for sugar and protein  These can be signs of gestational diabetes or infection  Protein in your urine may also be a sign of preeclampsia  Preeclampsia is a condition that can develop during week 20 or later of your pregnancy  It causes high blood pressure, and it can cause problems with your kidneys and other organs  A gestational diabetes screen  may be done  Your healthcare provider may order either a 1-step or 2-step oral glucose tolerance test (OGTT)  1-step OGTT:  Your blood sugar level will be tested after you have not eaten for 8 hours (fasting)  You will then be given a glucose drink  Your level will be tested again 1 hour and 2 hours after you finish the drink  2-step OGTT:  You do not have to fast for the first part of the test  You will have the glucose drink at any time of day  Your blood sugar level will be checked 1 hour later  If your blood sugar is higher than a certain level, another test will be ordered  You will fast and your blood sugar level will be tested  You will have the glucose drink  Your blood will be tested again 1 hour, 2 hours, and 3 hours after you finish the glucose drink  A Tdap vaccine  may be recommended by your healthcare provider  Fundal height  is a measurement of your uterus to check your baby's growth  This number is usually the same as the number of weeks that you have been pregnant   Your healthcare provider may also check your baby's position  Your baby's heart rate  will be checked  Follow up with your obstetrician as directed:  Write down your questions so you remember to ask them during your visits  © Copyright [employer] 2022 Information is for End User's use only and may not be sold, redistributed or otherwise used for commercial purposes  All illustrations and images included in CareNotes® are the copyrighted property of [employer]  or [employer]   The above information is an  only  It is not intended as medical advice for individual conditions or treatments  Talk to your doctor, nurse or pharmacist before following any medical regimen to see if it is safe and effective for you

## 2023-01-12 NOTE — PROGRESS NOTES
Patient is here for prenatal ob visit today  No question's or concerns at this time  GA: 30w6d  PABLO: 3/17/23    Urine: Protein neg / Glucose neg  Denies loss of fluid, vaginal bleeding and contractions  Good fetal movement  Patient is having boy  28 week labs collected 22  -passed 1 hr glu  Yellow folder given at last appt  Delivery consent not signed  UTD with covid and flu vaccine  Tdap given today  Tolerated well  VIS provided  RD  Presented to  ED  for abdominal cramping  Discharged, nothing concerning

## 2023-01-12 NOTE — ASSESSMENT & PLAN NOTE
Carrie Avery is a 25 y o    30w6d who presents for routine PNV  Prepregnancy BMI 16 98 with a goal weight gain 12 5 kg (27 lb)-18 kg (39 lb)  TWG 13 8 kg (30 lb 6 4 oz)  Denies OB complaints  Good fetal movement  Denies contractions, cramping, leakage of fluid or vaginal bleeding  The patient was counseled about the labor process  She was counseled about the possible need for operative delivery using the vacuum or forceps and the indications for doing so  She was counseled that there is a small risk of  complications including intracranial hemorrhage, lacerations, nerve damage or fracture as well as the increased risk for more severe maternal laceration  She was counseled regarding potential reasons that she may need a  section including arrest of dilation/descent, non-reassuring fetal status, or worsening maternal status  She was counseled on the risks of  including bleeding, infection, and injury to surrounding structures including the bowel and bladder  She was counseled that there are medical and surgical methods to manage excessive postpartum bleeding  She was counseled that in the event of excessive blood loss, she may require blood transfusion which includes a small risk of blood borne diseases such as hepatitis and HIV  The patient is OK with receiving a blood transfusion if necessary  The patient signed consent  tdap completed today  Reviewed  labor precautions and FKCs  Advised to start Perineal massage     Pregnancy Essential guide and Baby and Me web site recommended

## 2023-01-20 ENCOUNTER — ULTRASOUND (OUTPATIENT)
Dept: PERINATAL CARE | Facility: OTHER | Age: 23
End: 2023-01-20

## 2023-01-20 VITALS
BODY MASS INDEX: 22.64 KG/M2 | DIASTOLIC BLOOD PRESSURE: 60 MMHG | SYSTOLIC BLOOD PRESSURE: 100 MMHG | WEIGHT: 132.6 LBS | HEART RATE: 88 BPM | HEIGHT: 64 IN

## 2023-01-20 DIAGNOSIS — Z3A.32 32 WEEKS GESTATION OF PREGNANCY: Primary | ICD-10-CM

## 2023-01-20 DIAGNOSIS — Z36.2 ENCOUNTER FOR FOLLOW-UP ULTRASOUND OF FETAL ANATOMY: ICD-10-CM

## 2023-01-20 DIAGNOSIS — Z36.89 ENCOUNTER FOR ULTRASOUND TO CHECK FETAL GROWTH: ICD-10-CM

## 2023-01-20 NOTE — PROGRESS NOTES
[address]: Ms Kathryn Jessica was seen today for fetal growth and followup missed anatomy ultrasound  See ultrasound report under "OB Procedures" tab  The time spent on this established patient on the encounter date included 4 minutes previsit service time reviewing records and precharting, 4 minutes face-to-face service time counseling regarding results and coordinating care, and  4 minutes charting, totalling 12 minutes    Please don't hesitate to contact our office with any concerns or questions   -Thuan Garcia MD

## 2023-01-20 NOTE — PATIENT INSTRUCTIONS
Thank you for choosing us for your  care today  If you have any questions about your ultrasound or care, please do not hesitate to contact us or your primary obstetrician  Some general instructions for your pregnancy are:    Protect against coronavirus: get vaccinated - pregnant women are increased risk of severe COVID  Notify your primary care doctor if you have any symptoms  Exercise: Aim for 22 minutes per day (150 minutes per week) of regular exercise  Walking is great! Nutrition: aim for calcium-rich and iron-rich foods as well as healthy sources of protein  Learn about Preeclampsia: preeclampsia is a common, serious high blood pressure complication in pregnancy  A blood pressure of 401ONUT (systolic or top number) or 86LYLO (diastolic or bottom number) is not normal and needs evaluation by your doctor  Aspirin is sometimes prescribed in early pregnancy to prevent preeclampsia in women with risk factors - ask your obstetrician if you should be on this medication  If you smoke, try to reduce how many cigarettes you smoke or try to quit completely  Do not vape  Other warning signs to watch out for in pregnancy or postpartum: chest pain, obstructed breathing or shortness of breath, seizures, thoughts of hurting yourself or your baby, bleeding, a painful or swollen leg, fever, or headache (see [facility] POST-BIRTH Warning Signs campaign)  If these happen call 911  Itching is also not normal in pregnancy and if you experience this, especially over your hands and feet, potentially worse at night, notify your doctors

## 2023-01-25 PROBLEM — Z3A.32 32 WEEKS GESTATION OF PREGNANCY: Status: ACTIVE | Noted: 2022-08-23

## 2023-01-25 NOTE — PATIENT INSTRUCTIONS
Pregnancy at 31 to [address]:   What changes are happening with my body? You may continue to have symptoms such as shortness of breath, heartburn, contractions, or swelling of your ankles and feet  You may be gaining about 1 pound a week now  How do I care for myself at this stage of my pregnancy? Eat a variety of healthy foods  Healthy foods include fruits, vegetables, whole-grain breads, low-fat dairy foods, beans, lean meats, and fish  Drink liquids as directed  Ask how much liquid to drink each day and which liquids are best for you  Limit caffeine to less than 200 milligrams each day  Limit your intake of fish to 2 servings each week  Choose fish low in mercury such as canned light tuna, shrimp, salmon, cod, or tilapia  Do not  eat fish high in mercury such as swordfish, tilefish, zachary mackerel, and shark  Manage heartburn  by eating 4 or 5 small meals each day instead of large meals  Avoid spicy food  Manage swelling  by lying down and putting your feet up  Take prenatal vitamins as directed  Your need for certain vitamins and minerals, such as folic acid, increases during pregnancy  Prenatal vitamins provide some of the extra vitamins and minerals you need  Prenatal vitamins may also help to decrease the risk of certain birth defects  Talk to your healthcare provider about exercise  Moderate exercise can help you stay fit  Your healthcare provider will help you plan an exercise program that is safe for you during pregnancy  Do not smoke  Smoking increases your risk of a miscarriage and other health problems during your pregnancy  Smoking can cause your baby to be born too early or weigh less at birth  Ask your healthcare provider for information if you need help quitting  Do not drink alcohol  Alcohol passes from your body to your baby through the placenta   It can affect your baby's brain development and cause fetal alcohol syndrome (FAS)  FAS is a group of conditions that causes mental, behavior, and growth problems  Talk to your healthcare provider before you take any medicines  Many medicines may harm your baby if you take them when you are pregnant  Do not take any medicines, vitamins, herbs, or supplements without first talking to your healthcare provider  Never use illegal or street drugs (such as marijuana or cocaine) while you are pregnant  What are some safety tips during pregnancy? Avoid hot tubs and saunas  Do not use a hot tub or sauna while you are pregnant, especially during your first trimester  Hot tubs and saunas may raise your baby's temperature and increase the risk of birth defects  Avoid toxoplasmosis  This is an infection caused by eating raw meat or being around infected cat feces  It can cause birth defects, miscarriages, and other problems  Wash your hands after you touch raw meat  Make sure any meat is well-cooked before you eat it  Avoid raw eggs and unpasteurized milk  Use gloves or ask someone else to clean your cat's litter box while you are pregnant  What changes are happening with my baby? By 34 weeks, your baby may weigh more than 5 pounds  Your baby will be about 12 ½ inches long from the top of the head to the rump (baby's bottom)  Your baby is gaining about ½ pound a week  Your baby's eyes open and close now  Your baby's kicks and movements are more forceful at this time  What do I need to know about prenatal care? Your healthcare provider will check your blood pressure and weight  You may also need the following:  A urine test  may also be done to check for sugar and protein  These can be signs of gestational diabetes or infection  Protein in your urine may also be a sign of preeclampsia  Preeclampsia is a condition that can develop during week 20 or later of your pregnancy  It causes high blood pressure, and it can cause problems with your kidneys and other organs      A gestational diabetes screen  may be done  Your healthcare provider may order either a 1-step or 2-step oral glucose tolerance test (OGTT)  1-step OGTT:  Your blood sugar level will be tested after you have not eaten for 8 hours (fasting)  You will then be given a glucose drink  Your level will be tested again 1 hour and 2 hours after you finish the drink  2-step OGTT:  You do not have to fast for the first part of the test  You will have the glucose drink at any time of day  Your blood sugar level will be checked 1 hour later  If your blood sugar is higher than a certain level, another test will be ordered  You will fast and your blood sugar level will be tested  You will have the glucose drink  Your blood will be tested again 1 hour, 2 hours, and 3 hours after you finish the glucose drink  A Tdap vaccine  may be recommended by your healthcare provider  Fundal height  is a measurement of your uterus to check your baby's growth  This number is usually the same as the number of weeks that you have been pregnant  Your healthcare provider may also check your baby's position  Your baby's heart rate  will be checked  When should I seek immediate care? You develop a severe headache that does not go away  You have new or increased vision changes, such as blurred or spotted vision  You have new or increased swelling in your face or hands  You have vaginal spotting or bleeding  Your water broke or you feel warm water gushing or trickling from your vagina  When should I call my obstetrician? You have more than 5 contractions in 1 hour  You notice any changes in your baby's movements  You have abdominal cramps, pressure, or tightening  You have a change in vaginal discharge  You have chills or a fever  You have vaginal itching, burning, or pain  You have yellow, green, white, or foul-smelling vaginal discharge      You have pain or burning when you urinate, less urine than usual, or pink or bloody urine  You have questions or concerns about your condition or care  CARE AGREEMENT:   You have the right to help plan your care  Learn about your health condition and how it may be treated  Discuss treatment options with your healthcare providers to decide what care you want to receive  You always have the right to refuse treatment  The above information is an  only  It is not intended as medical advice for individual conditions or treatments  Talk to your doctor, nurse or pharmacist before following any medical regimen to see if it is safe and effective for you  © Copyright [employer] 2022 Information is for End User's use only and may not be sold, redistributed or otherwise used for commercial purposes   All illustrations and images included in CareNotes® are the copyrighted property of A [employer] A M , Inc  or [address] [place] [address]

## 2023-01-25 NOTE — ASSESSMENT & PLAN NOTE
She feels well  She denies any urinary issues, fever, pelvic cramping or chills  She denies LOF/CTX/VB  She did not have any concerns today  We discussed fetal kick counting  Advised to start her perineal massages at 34 weeks

## 2023-01-26 ENCOUNTER — ROUTINE PRENATAL (OUTPATIENT)
Dept: OBGYN CLINIC | Age: 23
End: 2023-01-26

## 2023-01-26 VITALS — BODY MASS INDEX: 22.66 KG/M2 | DIASTOLIC BLOOD PRESSURE: 82 MMHG | WEIGHT: 132 LBS | SYSTOLIC BLOOD PRESSURE: 114 MMHG

## 2023-01-26 DIAGNOSIS — Z3A.32 32 WEEKS GESTATION OF PREGNANCY: ICD-10-CM

## 2023-01-26 DIAGNOSIS — Z34.93 PRENATAL CARE IN THIRD TRIMESTER: Primary | ICD-10-CM

## 2023-01-26 PROBLEM — Z34.03 ENCOUNTER FOR SUPERVISION OF NORMAL FIRST PREGNANCY IN THIRD TRIMESTER: Status: ACTIVE | Noted: 2022-12-07

## 2023-01-26 LAB
SL AMB  POCT GLUCOSE, UA: NORMAL
SL AMB POCT URINE PROTEIN: NORMAL

## 2023-01-26 NOTE — PROGRESS NOTES
Problem   Encounter for Supervision of Normal First Pregnancy in Third Trimester   32 Weeks Gestation of Pregnancy    Blood Type: O positive  PN1 Labs- mild anemia  28 Week Labs - mild anemia  Passed glucose test    - S/p tdap/COVID    - TWG- 15 kg (33 lb)  - Delivery consent- signed          32 weeks gestation of pregnancy  She feels well  She denies any urinary issues, fever, pelvic cramping or chills  She denies LOF/CTX/VB  She did not have any concerns today  We discussed fetal kick counting  Advised to start her perineal massages at 34 weeks

## 2023-01-31 LAB
DME PARACHUTE DELIVERY DATE REQUESTED: NORMAL
DME PARACHUTE ITEM DESCRIPTION: NORMAL
DME PARACHUTE ORDER STATUS: NORMAL
DME PARACHUTE SUPPLIER NAME: NORMAL
DME PARACHUTE SUPPLIER PHONE: NORMAL

## 2023-02-08 ENCOUNTER — ROUTINE PRENATAL (OUTPATIENT)
Dept: OBGYN CLINIC | Age: 23
End: 2023-02-08

## 2023-02-08 VITALS
BODY MASS INDEX: 23.29 KG/M2 | WEIGHT: 136.4 LBS | DIASTOLIC BLOOD PRESSURE: 68 MMHG | HEIGHT: 64 IN | SYSTOLIC BLOOD PRESSURE: 112 MMHG

## 2023-02-08 DIAGNOSIS — Z3A.34 34 WEEKS GESTATION OF PREGNANCY: Primary | ICD-10-CM

## 2023-02-08 LAB
SL AMB  POCT GLUCOSE, UA: NEGATIVE
SL AMB POCT URINE PROTEIN: NEGATIVE

## 2023-02-08 NOTE — ASSESSMENT & PLAN NOTE
Siomara Dunaway is a 25 y o    34w5d who presents for routine PNV  28 week labs reviewed: wnl  Prepregnancy BMI 16 98 with a goal weight gain 12 5 kg (27 lb)-18 kg (39 lb)  TWG 17 kg (37 lb 6 4 oz)    Denies OB complaints  Good fetal movement  Denies LOF/CTX/VB  Reviewed FKCs, PTL vs BH     Continue perineal massages

## 2023-02-08 NOTE — PROGRESS NOTES
Problem   34 Weeks Gestation of Pregnancy    Blood Type: O positive  PN1 Labs- mild anemia  28 Week Labs - mild anemia  Passed glucose test    - S/p tdap/COVID    - TWG- 15 kg (33 lb)  - Delivery consent- signed          34 weeks gestation of pregnancy  Ambar Cross is a 25 y o  [place]  34w5d who presents for routine PNV  28 week labs reviewed: wnl  Prepregnancy BMI 16 98 with a goal weight gain 12 5 kg (27 lb)-18 kg (39 lb)  TWG 17 kg (37 lb 6 4 oz)    Denies OB complaints  Good fetal movement  Denies LOF/CTX/VB  Reviewed FKCs, PTL vs BH     Continue perineal massages

## 2023-02-08 NOTE — PATIENT INSTRUCTIONS
Pregnancy at 28 to 38 Weeks   AMBULATORY CARE:   Changes happening with your body: You are considered full term at the beginning of 37 weeks  Your breathing may be easier if your baby has moved down into a head-down position  You may need to urinate more often because the baby may be pressing on your bladder  You may also feel more discomfort and get tired easily  Seek care immediately if:   You develop a severe headache that does not go away  You have new or increased vision changes, such as blurred or spotted vision  You have new or increased swelling in your face or hands  You have vaginal spotting or bleeding  Your water broke or you feel warm water gushing or trickling from your vagina  Call your obstetrician if:   You have more than 5 contractions in 1 hour  You notice any changes in your baby's movements  You have abdominal cramps, pressure, or tightening  You have a change in vaginal discharge  You have chills or a fever  You have vaginal itching, burning, or pain  You have yellow, green, white, or foul-smelling vaginal discharge  You have pain or burning when you urinate, less urine than usual, or pink or bloody urine  You have questions or concerns about your condition or care  How to care for yourself at this stage of your pregnancy:       Eat a variety of healthy foods  Healthy foods include fruits, vegetables, whole-grain breads, low-fat dairy foods, beans, lean meats, and fish  Drink liquids as directed  Ask how much liquid to drink each day and which liquids are best for you  Limit caffeine to less than 200 milligrams each day  Limit your intake of fish to 2 servings each week  Choose fish low in mercury such as canned light tuna, shrimp, salmon, cod, or tilapia  Do not  eat fish high in mercury such as swordfish, tilefish, zachary mackerel, and shark  Take prenatal vitamins as directed    Your need for certain vitamins and minerals, such as folic acid, increases during pregnancy  Prenatal vitamins provide some of the extra vitamins and minerals you need  Prenatal vitamins may also help to decrease the risk of certain birth defects  Rest as needed  Put your feet up if you have swelling in your ankles and feet  Talk to your healthcare provider about exercise  Moderate exercise can help you stay fit  Your healthcare provider will help you plan an exercise program that is safe for you during pregnancy  Do not smoke  Smoking increases your risk of a miscarriage and other health problems during your pregnancy  Smoking can cause your baby to be born early or weigh less at birth  Ask your healthcare provider for information if you need help quitting  Do not drink alcohol  Alcohol passes from your body to your baby through the placenta  It can affect your baby's brain development and cause fetal alcohol syndrome (FAS)  FAS is a group of conditions that causes mental, behavior, and growth problems  Talk to your healthcare provider before you take any medicines  Many medicines may harm your baby if you take them when you are pregnant  Do not take any medicines, vitamins, herbs, or supplements without first talking to your healthcare provider  Never use illegal or street drugs (such as marijuana or cocaine) while you are pregnant  Safety tips during pregnancy:   Avoid hot tubs and saunas  Do not use a hot tub or sauna while you are pregnant, especially during your first trimester  Hot tubs and saunas may raise your baby's temperature and increase the risk of birth defects  Avoid toxoplasmosis  This is an infection caused by eating raw meat or being around infected cat feces  It can cause birth defects, miscarriages, and other problems  Wash your hands after you touch raw meat  Make sure any meat is well-cooked before you eat it  Avoid raw eggs and unpasteurized milk   Use gloves or ask someone else to clean your cat's litter box while you are pregnant  Ask your healthcare provider about travel  The most comfortable time to travel is during the second trimester  Ask your provider if you can travel after 36 weeks  You may not be able to travel in an airplane after 36 weeks  He or she may also recommend you avoid long road trips  Changes happening with your baby:  By 38 weeks, your baby may weigh between 6 and 9 pounds  Your baby may be about 14 inches long from the top of the head to the rump (baby's bottom)  Your baby hears well enough to know your voice  As your baby gets larger, you may feel fewer kicks and more stretching and rolling  Your baby may move into a head-down position  Your baby will also rest lower in your abdomen  What you need to know about prenatal care: Your healthcare provider will check your blood pressure and weight  You may also need the following:  A urine test  may also be done to check for sugar and protein  These can be signs of gestational diabetes or infection  Protein in your urine may also be a sign of preeclampsia  Preeclampsia is a condition that can develop during week 20 or later of your pregnancy  It causes high blood pressure, and it can cause problems with your kidneys and other organs  A gestational diabetes screen  may be done  Your healthcare provider may order either a 1-step or 2-step oral glucose tolerance test (OGTT)  1-step OGTT:  Your blood sugar level will be tested after you have not eaten for 8 hours (fasting)  You will then be given a glucose drink  Your level will be tested again 1 hour and 2 hours after you finish the drink  2-step OGTT:  You do not have to fast for the first part of the test  You will have the glucose drink at any time of day  Your blood sugar level will be checked 1 hour later  If your blood sugar is higher than a certain level, another test will be ordered  You will fast and your blood sugar level will be tested  You will have the glucose drink  Your blood will be tested again 1 hour, 2 hours, and 3 hours after you finish the glucose drink  A blood test  may be done to check for anemia (low iron level)  A Tdap vaccine  may be recommended by your healthcare provider  A group B strep test  is a test that is done to check for group B strep infection  Group B strep is a type of bacteria that may be found in the vagina or rectum  It can be passed to your baby during delivery if you have it  Your healthcare provider will take swab your vagina or rectum and send the sample to the lab for tests  Fundal height  is a measurement of your uterus to check your baby's growth  This number is usually the same as the number of weeks that you have been pregnant  Your healthcare provider may also check your baby's position  Your baby's heart rate  will be checked  Follow up with your obstetrician as directed:  Write down your questions so you remember to ask them during your visits  © Copyright [address] 2022 Information is for End User's use only and may not be sold, redistributed or otherwise used for commercial purposes  All illustrations and images included in CareNotes® are the copyrighted property of [employer]  or [address] [place], [place] [address]chantelle terri   The above information is an  only  It is not intended as medical advice for individual conditions or treatments  Talk to your doctor, nurse or pharmacist before following any medical regimen to see if it is safe and effective for you

## 2023-02-08 NOTE — PROGRESS NOTES
Patient is here for prenatal ob visit today  No question's or concerns at this time  GA: 34w5d  Estimated Date of Delivery: 3/17/23    Urine: Protein Negative / Glucose Negative  Denies loss of fluid, vaginal bleeding and contractions  Good fetal movement  Labs are completed and up to date  Up to date with Covid and Tdap vaccines

## 2023-02-21 ENCOUNTER — ROUTINE PRENATAL (OUTPATIENT)
Dept: OBGYN CLINIC | Age: 23
End: 2023-02-21

## 2023-02-21 VITALS
WEIGHT: 139.8 LBS | DIASTOLIC BLOOD PRESSURE: 68 MMHG | HEIGHT: 64 IN | SYSTOLIC BLOOD PRESSURE: 110 MMHG | BODY MASS INDEX: 23.87 KG/M2

## 2023-02-21 DIAGNOSIS — Z34.83 ENCOUNTER FOR SUPERVISION OF OTHER NORMAL PREGNANCY, THIRD TRIMESTER: Primary | ICD-10-CM

## 2023-02-21 DIAGNOSIS — Z3A.36 36 WEEKS GESTATION OF PREGNANCY: ICD-10-CM

## 2023-02-21 LAB
SL AMB  POCT GLUCOSE, UA: NORMAL
SL AMB POCT URINE PROTEIN: NORMAL

## 2023-02-21 NOTE — PROGRESS NOTES
Pt is here for routine ob visit   No concerns at this time  Urine neg/neg   No LOF,VB,Contractions  +FM   UTD flu/tdap/covid vaccines   Delivery consent signed   GBS collected today

## 2023-02-21 NOTE — PROGRESS NOTES
25 y o   at 36w4d, here for routine OB visit  Feeling well overall and without concerns  Good FM  Denies LOF, VB, contractions  No questions/concerns  Problem   36 Weeks Gestation of Pregnancy    Blood Type: O positive  PN1 Labs- mild anemia     28 Week Labs - mild anemia    - S/p tdap/COVID vaccines  - prepreg BMI 16, TWG- 18 5 kg (40 lb 12 8 oz)  - Delivery consent- signed   - GBS collected         Problem List Items Addressed This Visit        Other    36 weeks gestation of pregnancy   Other Visit Diagnoses     Encounter for supervision of other normal pregnancy, third trimester    -  Primary    Relevant Orders    POCT urine dip    Strep B DNA probe, amplification

## 2023-02-23 LAB — GP B STREP DNA SPEC QL NAA+PROBE: NEGATIVE

## 2023-02-28 ENCOUNTER — ROUTINE PRENATAL (OUTPATIENT)
Dept: OBGYN CLINIC | Age: 23
End: 2023-02-28

## 2023-02-28 VITALS — SYSTOLIC BLOOD PRESSURE: 116 MMHG | DIASTOLIC BLOOD PRESSURE: 70 MMHG | BODY MASS INDEX: 24.55 KG/M2 | WEIGHT: 143 LBS

## 2023-02-28 DIAGNOSIS — Z3A.36 36 WEEKS GESTATION OF PREGNANCY: Primary | ICD-10-CM

## 2023-02-28 DIAGNOSIS — Z3A.37 37 WEEKS GESTATION OF PREGNANCY: ICD-10-CM

## 2023-02-28 LAB
SL AMB  POCT GLUCOSE, UA: NORMAL
SL AMB POCT URINE PROTEIN: NORMAL

## 2023-02-28 NOTE — PATIENT INSTRUCTIONS
Pregnancy at 28 to 38 Weeks   AMBULATORY CARE:   Changes happening with your body: You are considered full term at the beginning of 37 weeks  Your breathing may be easier if your baby has moved down into a head-down position  You may need to urinate more often because the baby may be pressing on your bladder  You may also feel more discomfort and get tired easily  Seek care immediately if:   You develop a severe headache that does not go away  You have new or increased vision changes, such as blurred or spotted vision  You have new or increased swelling in your face or hands  You have vaginal spotting or bleeding  Your water broke or you feel warm water gushing or trickling from your vagina  Call your obstetrician if:   You have more than 5 contractions in 1 hour  You notice any changes in your baby's movements  You have abdominal cramps, pressure, or tightening  You have a change in vaginal discharge  You have chills or a fever  You have vaginal itching, burning, or pain  You have yellow, green, white, or foul-smelling vaginal discharge  You have pain or burning when you urinate, less urine than usual, or pink or bloody urine  You have questions or concerns about your condition or care  How to care for yourself at this stage of your pregnancy:       Eat a variety of healthy foods  Healthy foods include fruits, vegetables, whole-grain breads, low-fat dairy foods, beans, lean meats, and fish  Drink liquids as directed  Ask how much liquid to drink each day and which liquids are best for you  Limit caffeine to less than 200 milligrams each day  Limit your intake of fish to 2 servings each week  Choose fish low in mercury such as canned light tuna, shrimp, salmon, cod, or tilapia  Do not  eat fish high in mercury such as swordfish, tilefish, zachary mackerel, and shark  Take prenatal vitamins as directed    Your need for certain vitamins and minerals, such as folic acid, increases during pregnancy  Prenatal vitamins provide some of the extra vitamins and minerals you need  Prenatal vitamins may also help to decrease the risk of certain birth defects  Rest as needed  Put your feet up if you have swelling in your ankles and feet  Talk to your healthcare provider about exercise  Moderate exercise can help you stay fit  Your healthcare provider will help you plan an exercise program that is safe for you during pregnancy  Do not smoke  Smoking increases your risk of a miscarriage and other health problems during your pregnancy  Smoking can cause your baby to be born early or weigh less at birth  Ask your healthcare provider for information if you need help quitting  Do not drink alcohol  Alcohol passes from your body to your baby through the placenta  It can affect your baby's brain development and cause fetal alcohol syndrome (FAS)  FAS is a group of conditions that causes mental, behavior, and growth problems  Talk to your healthcare provider before you take any medicines  Many medicines may harm your baby if you take them when you are pregnant  Do not take any medicines, vitamins, herbs, or supplements without first talking to your healthcare provider  Never use illegal or street drugs (such as marijuana or cocaine) while you are pregnant  Safety tips during pregnancy:   Avoid hot tubs and saunas  Do not use a hot tub or sauna while you are pregnant, especially during your first trimester  Hot tubs and saunas may raise your baby's temperature and increase the risk of birth defects  Avoid toxoplasmosis  This is an infection caused by eating raw meat or being around infected cat feces  It can cause birth defects, miscarriages, and other problems  Wash your hands after you touch raw meat  Make sure any meat is well-cooked before you eat it  Avoid raw eggs and unpasteurized milk   Use gloves or ask someone else to clean your cat's litter box while you are pregnant  Ask your healthcare provider about travel  The most comfortable time to travel is during the second trimester  Ask your provider if you can travel after 36 weeks  You may not be able to travel in an airplane after 36 weeks  He or she may also recommend you avoid long road trips  Changes happening with your baby:  By 38 weeks, your baby may weigh between 6 and 9 pounds  Your baby may be about 14 inches long from the top of the head to the rump (baby's bottom)  Your baby hears well enough to know your voice  As your baby gets larger, you may feel fewer kicks and more stretching and rolling  Your baby may move into a head-down position  Your baby will also rest lower in your abdomen  What you need to know about prenatal care: Your healthcare provider will check your blood pressure and weight  You may also need the following:  A urine test  may also be done to check for sugar and protein  These can be signs of gestational diabetes or infection  Protein in your urine may also be a sign of preeclampsia  Preeclampsia is a condition that can develop during week 20 or later of your pregnancy  It causes high blood pressure, and it can cause problems with your kidneys and other organs  A gestational diabetes screen  may be done  Your healthcare provider may order either a 1-step or 2-step oral glucose tolerance test (OGTT)  1-step OGTT:  Your blood sugar level will be tested after you have not eaten for 8 hours (fasting)  You will then be given a glucose drink  Your level will be tested again 1 hour and 2 hours after you finish the drink  2-step OGTT:  You do not have to fast for the first part of the test  You will have the glucose drink at any time of day  Your blood sugar level will be checked 1 hour later  If your blood sugar is higher than a certain level, another test will be ordered  You will fast and your blood sugar level will be tested  You will have the glucose drink  Your blood will be tested again 1 hour, 2 hours, and 3 hours after you finish the glucose drink  A blood test  may be done to check for anemia (low iron level)  A Tdap vaccine  may be recommended by your healthcare provider  A group B strep test  is a test that is done to check for group B strep infection  Group B strep is a type of bacteria that may be found in the vagina or rectum  It can be passed to your baby during delivery if you have it  Your healthcare provider will take swab your vagina or rectum and send the sample to the lab for tests  Fundal height  is a measurement of your uterus to check your baby's growth  This number is usually the same as the number of weeks that you have been pregnant  Your healthcare provider may also check your baby's position  Your baby's heart rate  will be checked  Follow up with your obstetrician as directed:  Write down your questions so you remember to ask them during your visits  © Copyright [facility] 2022 Information is for End User's use only and may not be sold, redistributed or otherwise used for commercial purposes  The above information is an  only  It is not intended as medical advice for individual conditions or treatments  Talk to your doctor, nurse or pharmacist before following any medical regimen to see if it is safe and effective for you

## 2023-02-28 NOTE — ASSESSMENT & PLAN NOTE
She feels well  She denies LOF/CTX/VB  She voiced no concerns  Discussed fetal kick counting  She was encouraged to continue with her perineal/vaginal massages to prevent lacerations during the labor process

## 2023-02-28 NOTE — PROGRESS NOTES
Problem   37 Weeks Gestation of Pregnancy    Blood Type: O positive  PN1 Labs- mild anemia  28 Week Labs - mild anemia    - S/p tdap/COVID vaccines  - prepreg BMI 16, TWG- 20 kg (44 lb)  - Delivery consent- signed   - GBS NEGATIVE       37 weeks gestation of pregnancy  She feels well  She denies LOF/CTX/VB  She voiced no concerns  Discussed fetal kick counting  She was encouraged to continue with her perineal/vaginal massages to prevent lacerations during the labor process

## 2023-03-03 NOTE — PROGRESS NOTES
Pt is here for routine ob visit   No concerns at this time  Urine neg/neg   No LOF,VB,Contractions  Mild cramping   +FM   UTD Flu/tdap/covid vaccines   GBS negative   Delivery consent signed at previous visit

## 2023-03-06 ENCOUNTER — ROUTINE PRENATAL (OUTPATIENT)
Dept: OBGYN CLINIC | Age: 23
End: 2023-03-06

## 2023-03-06 VITALS
BODY MASS INDEX: 24.28 KG/M2 | WEIGHT: 142.2 LBS | HEIGHT: 64 IN | SYSTOLIC BLOOD PRESSURE: 120 MMHG | DIASTOLIC BLOOD PRESSURE: 62 MMHG

## 2023-03-06 DIAGNOSIS — Z34.83 ENCOUNTER FOR SUPERVISION OF OTHER NORMAL PREGNANCY, THIRD TRIMESTER: Primary | ICD-10-CM

## 2023-03-06 DIAGNOSIS — Z3A.38 38 WEEKS GESTATION OF PREGNANCY: ICD-10-CM

## 2023-03-06 LAB
SL AMB  POCT GLUCOSE, UA: NEGATIVE
SL AMB POCT URINE PROTEIN: NEGATIVE

## 2023-03-06 NOTE — PROGRESS NOTES
25 y o   at 38w3d, here for routine OB visit  Feeling well overall and without concerns  Good FM  Denies LOF, VB, contractions  Denies dysuria, hematuria  No problems with activity  No questions/concerns  Problem   38 Weeks Gestation of Pregnancy    Blood Type: O positive  PN1 Labs- mild anemia  28 Week Labs - mild anemia    - S/p tdap/COVID vaccines  - prepreg BMI 16, TWG- 20 kg (44 lb)  - Delivery consent- signed   - GBS NEGATIVE  - cervix 1cm today   IOL reviewed and she defers until after PABLO         Problem List Items Addressed This Visit        Other    38 weeks gestation of pregnancy   Other Visit Diagnoses     Encounter for supervision of other normal pregnancy, third trimester    -  Primary    Relevant Orders    POCT urine dip (Completed)

## 2023-03-13 ENCOUNTER — TELEPHONE (OUTPATIENT)
Dept: OBGYN CLINIC | Facility: CLINIC | Age: 23
End: 2023-03-13

## 2023-03-13 ENCOUNTER — ROUTINE PRENATAL (OUTPATIENT)
Dept: OBGYN CLINIC | Age: 23
End: 2023-03-13

## 2023-03-13 VITALS
WEIGHT: 141.6 LBS | DIASTOLIC BLOOD PRESSURE: 82 MMHG | SYSTOLIC BLOOD PRESSURE: 116 MMHG | HEIGHT: 64 IN | BODY MASS INDEX: 24.17 KG/M2

## 2023-03-13 DIAGNOSIS — Z3A.39 39 WEEKS GESTATION OF PREGNANCY: Primary | ICD-10-CM

## 2023-03-13 LAB
SL AMB  POCT GLUCOSE, UA: NEGATIVE
SL AMB POCT URINE PROTEIN: NEGATIVE

## 2023-03-13 NOTE — TELEPHONE ENCOUNTER
----- Message from Sid Medina, [address] sent at 3/13/2023  8:47 AM EDT -----  Regarding: IOL  Procedure to be scheduled: IOL   PABLO: 3/17/2022  Cervical dialtion: 2/80/-2  Indication for delivery: 40 weeks  Requested date (s) of delivery:  3/17 and onwards   If requested date is unavailable, is there a date by which the pt must be delivered? 3/31  Physician preference: none       If IOL, anticipated method: cytotec/[facility] - as of appointment today patient is 2cm and 80%   Will update request with next visit if exam is changed

## 2023-03-13 NOTE — PROGRESS NOTES
Patient is here for prenatal ob visit today  No question's or concerns at this time  GA: 39w3d  Estimated Date of Delivery: 3/17/23    Urine: Protein Negative / Glucose Negative  Denies loss of fluid, vaginal bleeding and contractions  Good fetal movement

## 2023-03-13 NOTE — ASSESSMENT & PLAN NOTE
Yaya Mak is a 25 y o    39w3d who presents for routine PNV  Prepregnancy BMI 16 98 with a goal weight gain 12 5 kg (27 lb)-18 kg (39 lb)  TWG 19 3 kg (42 lb 9 6 oz)    Denies OB complaints  Good fetal movement  Denies CTX/LOF/VB  Desires IOL at PABLO  Cervical exam /-2 today  Reviewed IOL process  Questions addressed  Consent signed  Reviewed early signs of labor and FKCs  Encouraged perineal massages  RTO in 1 week

## 2023-03-13 NOTE — PATIENT INSTRUCTIONS
Pregnancy at 44 to 40 Weeks   AMBULATORY CARE:   Changes happening with your body: You are now getting close to your due date  Your due date is just an estimate of when your baby will be born  Your baby may be born before or after your due date  Your breathing may be easier if your baby has moved down into a head-down position  You may need to urinate more often because the baby may be pressing on your bladder  You may also feel more discomfort and tire easily  You may also be having trouble sleeping  Seek care immediately if:   You develop a severe headache that does not go away  You have new or increased vision changes, such as blurred or spotted vision  You have new or increased swelling in your face or hands  You have vaginal spotting or bleeding  Your water broke or you feel warm water gushing or trickling from your vagina  Call your obstetrician if:   You have more than 5 contractions in 1 hour  You notice any changes in your baby's movements  You have abdominal cramps, pressure, or tightening  You have a change in vaginal discharge  You have chills or a fever  You have vaginal itching, burning, or pain  You have yellow, green, white, or foul-smelling vaginal discharge  You have pain or burning when you urinate, less urine than usual, or pink or bloody urine  You have questions or concerns about your condition or care  How to care for yourself at this stage of your pregnancy:       Eat a variety of healthy foods  Healthy foods include fruits, vegetables, whole-grain breads, low-fat dairy foods, beans, lean meats, and fish  Drink liquids as directed  Ask how much liquid to drink each day and which liquids are best for you  Limit caffeine to less than 200 milligrams each day  Limit your intake of fish to 2 servings each week  Choose fish low in mercury such as canned light tuna, shrimp, salmon, cod, or tilapia   Do not  eat fish high in mercury such as swordfish, tilefish, zachary mackerel, and shark  Take prenatal vitamins as directed  Your need for certain vitamins and minerals, such as folic acid, increases during pregnancy  Prenatal vitamins provide some of the extra vitamins and minerals you need  Prenatal vitamins may also help to decrease the risk of certain birth defects  Rest as needed  Put your feet up if you have swelling in your ankles and feet  Talk to your healthcare provider about exercise  Moderate exercise can help you stay fit  Your healthcare provider will help you plan an exercise program that is safe for you during pregnancy  Do not smoke  Smoking increases your risk of a miscarriage and other health problems during your pregnancy  Smoking can cause your baby to be born early or weigh less at birth  Ask your healthcare provider for information if you need help quitting  Do not drink alcohol  Alcohol passes from your body to your baby through the placenta  It can affect your baby's brain development and cause fetal alcohol syndrome (FAS)  FAS is a group of conditions that causes mental, behavior, and growth problems  Talk to your healthcare provider before you take any medicines  Many medicines may harm your baby if you take them when you are pregnant  Do not take any medicines, vitamins, herbs, or supplements without first talking to your healthcare provider  Never use illegal or street drugs (such as marijuana or cocaine) while you are pregnant  Safety tips during pregnancy:   Avoid hot tubs and saunas  Do not use a hot tub or sauna while you are pregnant, especially during your first trimester  Hot tubs and saunas may raise your baby's temperature and increase the risk of birth defects  Avoid toxoplasmosis  This is an infection caused by eating raw meat or being around infected cat feces  It can cause birth defects, miscarriages, and other problems  Wash your hands after you touch raw meat   Make sure any meat is well-cooked before you eat it  Avoid raw eggs and unpasteurized milk  Use gloves or ask someone else to clean your cat's litter box while you are pregnant  Ask your healthcare provider about travel  The most comfortable time to travel is during the second trimester  Ask your provider if you can travel after 36 weeks  You may not be able to travel in an airplane after 36 weeks  He or she may also recommend that you avoid long road trips  Changes happening with your baby: Your baby is ready to be born  At birth, your baby may weigh about 6 to 9 pounds and be about 19 to 21 inches long  Your baby may be in a head-down position  Your baby will also rest lower in your abdomen  What you need to know about prenatal care: Your healthcare provider will check your blood pressure and weight  You may also need the following:  A urine test  may also be done to check for sugar and protein  These can be signs of gestational diabetes or infection  Protein in your urine may also be a sign of preeclampsia  Preeclampsia is a condition that can develop during week 20 or later of your pregnancy  It causes high blood pressure, and it can cause problems with your kidneys and other organs  A gestational diabetes screen  may be done  Your healthcare provider may order either a 1-step or 2-step oral glucose tolerance test (OGTT)  1-step OGTT:  Your blood sugar level will be tested after you have not eaten for 8 hours (fasting)  You will then be given a glucose drink  Your level will be tested again 1 hour and 2 hours after you finish the drink  2-step OGTT:  You do not have to fast for the first part of the test  You will have the glucose drink at any time of day  Your blood sugar level will be checked 1 hour later  If your blood sugar is higher than a certain level, another test will be ordered  You will fast and your blood sugar level will be tested  You will have the glucose drink   Your blood will be tested again 1 hour, 2 hours, and 3 hours after you finish the glucose drink  Your baby's heart rate  will be checked  Follow up with your obstetrician as directed:  Write down your questions so you remember to ask them during your visits  © Copyright Jamie Isaac 2022 Information is for End User's use only and may not be sold, redistributed or otherwise used for commercial purposes  The above information is an  only  It is not intended as medical advice for individual conditions or treatments  Talk to your doctor, nurse or pharmacist before following any medical regimen to see if it is safe and effective for you  Induction of Labor   WHAT YOU NEED TO KNOW:   What is induction of labor? Induction of labor is a procedure to induce (start) your labor before it begins on its own  Medicines and other methods are used to start contractions and help your cervix soften, thin (efface), and dilate (open)  You may be given antibiotics to fight a bacterial infection you have or prevent an infection during delivery  Why might I need induction of labor? A health problem you have, such as high blood pressure or diabetes    A health problem your baby has, such as a slow heartbeat or poor growth inside the womb     Problems related to your pregnancy, such as infection of the amniotic fluid, your water breaks before labor begins, or you have too little amniotic fluid    What happens during induction of labor? Your healthcare provider may use one or more of the following to induce labor:  Cervical ripening  is a process that helps to soften and thin out your cervix  Medicines called prostaglandins may be used to ripen your cervix  These medicines can be inserted into your vagina or taken as a pill  Other methods can also be used to dilate the cervix  This includes a catheter with an inflatable balloon on the end that is inserted into your cervix   Saline injected through the catheter helps the balloon to expand  A substance that absorbs water may also be inserted into your cervix to help dilate it  Stripping the membranes  is a procedure that causes your body to release prostaglandins naturally  Prostaglandins soften the cervix and may help to cause contractions  Your healthcare provider will sweep a gloved finger over the membranes that connect the amniotic sac to the uterus wall  Rupturing the amniotic sac  is a procedure that is used to cause your water to break  Your healthcare provider will use a small tool to make a hole in your amniotic sac  This may help contractions to start  Oxytocin  may be given through an IV to cause contractions to start and stay strong and regular  What are the risks of induction of labor? Medicines used to induce labor may cause too many contractions  This can lower your baby's heartbeat and decrease his or her oxygen supply  Induction of labor also increases the risk of umbilical cord prolapse  This condition causes the umbilical cord to slip back into the vagina before delivery  It can compress the cord and decrease your baby's oxygen supply  Medical induction may cause an infection in you or your baby  Medical induction may also increase your risk for a  section (), especially if it is the first time you give birth  Your uterus may rupture if you have had a  before  CARE AGREEMENT:   You have the right to help plan your care  Learn about your health condition and how it may be treated  Discuss treatment options with your healthcare providers to decide what care you want to receive  You always have the right to refuse treatment  The above information is an  only  It is not intended as medical advice for individual conditions or treatments  Talk to your doctor, nurse or pharmacist before following any medical regimen to see if it is safe and effective for you     Calista Chilel  Information is for End User's use only and may not be sold, redistributed or otherwise used for commercial purposes  Having Your Baby: The Labor Process   AMBULATORY CARE:   The labor process  is a series of 3 stages that your body goes through to deliver your baby  It is not known for sure what causes labor to begin  Hormones made by you and your baby and changes in your uterus may help labor to start  Labor usually starts 2 weeks before or after your due date  Most women do not have their baby exactly on their due date  Call your obstetrician if:   You have vaginal spotting or bleeding  Your water broke or you feel warm water gushing or trickling from your vagina  You have more than 5 contractions in 1 hour  You have bloody mucus or show  You notice any changes in your baby's movements  You have abdominal cramps, pressure, or tightening  You have a change in vaginal discharge  You have questions or concerns about your condition or care  First stage of labor: The first stage of labor includes latent (early) labor and active labor  This stage may last up to 12 hours if this is your first pregnancy  It may last up to 10 hours if you delivered a baby before  Your uterus will contract to prepare your cervix for delivery and to push your baby out of the birth canal  Your cervix will dilate (widen) and efface (soften and become thinner)  Your contractions may last from 30 to 60 seconds  The contractions usually start in the back and move to the front  You may also have a pink, clear, or slightly bloody discharge called bloody show  Bloody show is caused by the movement of a mucus plug from your cervix  During pregnancy the mucus plug blocks your cervix to prevent it from opening  What to do during early labor:  Early labor may last for several hours  You will most likely be at home during early labor  Rest as much as possible while you are at home  Have someone rub your back   It may be helpful to place ice packs on your lower back  Go for a short walk if you are able  Drink water and suck on ice chips  Ask your healthcare provider if it is okay to eat during early labor  How to know when you are in active labor: This stage may last up to 12 hours if this is your first pregnancy  It may last up to 10 hours if you delivered a baby before  Your contractions will get stronger, last longer, and happen more frequently  They will also become more intense and painful  Time your contractions from the beginning of one to the beginning of the next  Write this information down for 1 hour  Your healthcare provider will tell you when to go to the hospital or birthing center  This will be based on how many minutes apart your contractions are  Second stage of labor:  The second stage is the time between full cervix dilation and the birth of your baby  Your cervix will be completely dilated to 10 centimeters and your baby will be ready to be born  The second stage usually lasts 20 minutes to 2 hours  It may last up to 3 hours if this is your first baby  You may be given antibiotics to fight a bacterial infection you have or prevent an infection during delivery  Healthcare providers will help you find a position for giving birth that is comfortable for you  You can lie on your back, have your feet up in stirrups, or squat  You may feel pressure on your rectum and the urge to push  This pressure is caused by the movement of your baby's head down the birth canal  Your healthcare provider will have you push when you feel the urge  He or she will guide your baby out of the birth canal  Forceps or suction may be used to help deliver your baby  You may also need an episiotomy (incision) to make the vaginal opening larger  This will make more room for your baby  Your perineum will be protected during delivery  This may be with a warm compress or massage of the area      At least 1 minute after your baby is born, your healthcare provider will put clamps on the umbilical cord  The cord will then be cut  Your baby may be placed on your chest right away  He or she may also start breastfeeding  Third stage of labor:  The placenta (afterbirth) is delivered during this stage  After you give birth, your uterus will continue to contract to help push out the placenta  These contractions will begin 5 to 30 minutes after you give birth  Your healthcare provider will tell you when to push  You may have chills or shakiness during this stage  You may be given medicine to help prevent heavy bleeding that can happen during this stage  How to manage labor pain:  Pain can be managed naturally or with medicines  You can naturally manage pain by using relaxation methods and controlled breathing  There are different types of medicines that can be used to relieve pain while you are in labor  These medicines may be given through an IV or an epidural (thin catheter in your lower back)  Talk with your healthcare about your options for pain medicines if you choose to use them  Tell your provider if you prefer not to have any pain control medicines during labor  © Copyright Paola Anderson 2022 Information is for End User's use only and may not be sold, redistributed or otherwise used for commercial purposes  The above information is an  only  It is not intended as medical advice for individual conditions or treatments  Talk to your doctor, nurse or pharmacist before following any medical regimen to see if it is safe and effective for you

## 2023-03-13 NOTE — PROGRESS NOTES
Problem   39 Weeks Gestation of Pregnancy    Blood Type: O positive  PN1 Labs- mild anemia  28 Week Labs - mild anemia    - S/p tdap/COVID vaccines  - prepreg BMI 16, TWG- 20 kg (44 lb)  - Delivery consent- signed   - GBS NEGATIVE       39 weeks gestation of pregnancy  Adria Magallon is a 25 y o    39w3d who presents for routine PNV  Prepregnancy BMI 16 98 with a goal weight gain 12 5 kg (27 lb)-18 kg (39 lb)  TWG 19 3 kg (42 lb 9 6 oz)    Denies OB complaints  Good fetal movement  Denies CTX/LOF/VB  Desires IOL at PABLO  Cervical exam /-2 today  Reviewed IOL process  Questions addressed  Consent signed  Reviewed early signs of labor and FKCs  Encouraged perineal massages  RTO in 1 week

## 2023-03-14 NOTE — TELEPHONE ENCOUNTER
Patient notified of IOL date,time,and location  Advised patient she may eat a light breakfast/dinner prior to going to L&D  In the interim please report any vaginal bleeding,leakage of fluid,decreased fetal movement or contractions  Reviewed fetal kick counts  Advised to keep all upcoming prenatal visits  Patient verbalizes understanding  Routed to on call providers as FYI ([facility])

## 2023-03-18 ENCOUNTER — ANESTHESIA EVENT (INPATIENT)
Dept: ANESTHESIOLOGY | Facility: HOSPITAL | Age: 23
End: 2023-03-18

## 2023-03-18 ENCOUNTER — NURSE TRIAGE (OUTPATIENT)
Dept: OTHER | Facility: OTHER | Age: 23
End: 2023-03-18

## 2023-03-18 ENCOUNTER — HOSPITAL ENCOUNTER (INPATIENT)
Facility: HOSPITAL | Age: 23
LOS: 2 days | Discharge: HOME | End: 2023-03-20
Attending: STUDENT IN AN ORGANIZED HEALTH CARE EDUCATION/TRAINING PROGRAM | Admitting: STUDENT IN AN ORGANIZED HEALTH CARE EDUCATION/TRAINING PROGRAM

## 2023-03-18 ENCOUNTER — HOSPITAL ENCOUNTER (EMERGENCY)
Facility: HOSPITAL | Age: 23
Discharge: STILL ADMITTED OR ADMINISTRATIVE | End: 2023-03-18

## 2023-03-18 ENCOUNTER — HOSPITAL ENCOUNTER (OUTPATIENT)
Facility: HOSPITAL | Age: 23
Discharge: HOME | End: 2023-03-18
Attending: STUDENT IN AN ORGANIZED HEALTH CARE EDUCATION/TRAINING PROGRAM | Admitting: STUDENT IN AN ORGANIZED HEALTH CARE EDUCATION/TRAINING PROGRAM

## 2023-03-18 ENCOUNTER — ANESTHESIA (INPATIENT)
Dept: ANESTHESIOLOGY | Facility: HOSPITAL | Age: 23
End: 2023-03-18

## 2023-03-18 VITALS
DIASTOLIC BLOOD PRESSURE: 61 MMHG | RESPIRATION RATE: 18 BRPM | TEMPERATURE: 98.1 F | SYSTOLIC BLOOD PRESSURE: 127 MMHG | HEART RATE: 72 BPM

## 2023-03-18 DIAGNOSIS — Z34.03 ENCOUNTER FOR SUPERVISION OF NORMAL FIRST PREGNANCY IN THIRD TRIMESTER: Primary | ICD-10-CM

## 2023-03-18 LAB
ABO GROUP BLD: NORMAL
AMPHETAMINES SERPL QL SCN: NEGATIVE
BARBITURATES UR QL: NEGATIVE
BENZODIAZ UR QL: NEGATIVE
BLD GP AB SCN SERPL QL: NEGATIVE
COCAINE UR QL: NEGATIVE
ERYTHROCYTE [DISTWIDTH] IN BLOOD BY AUTOMATED COUNT: 14 % (ref 11.6–15.1)
HCT VFR BLD AUTO: 35.8 % (ref 34.8–46.1)
HGB BLD-MCNC: 11.4 G/DL (ref 11.5–15.4)
HOLD SPECIMEN: NORMAL
MCH RBC QN AUTO: 26.3 PG (ref 26.8–34.3)
MCHC RBC AUTO-ENTMCNC: 31.8 G/DL (ref 31.4–37.4)
MCV RBC AUTO: 83 FL (ref 82–98)
METHADONE UR QL: NEGATIVE
OPIATES UR QL SCN: NEGATIVE
OXYCODONE+OXYMORPHONE UR QL SCN: NEGATIVE
PCP UR QL: NEGATIVE
PLATELET # BLD AUTO: 217 THOUSANDS/UL (ref 149–390)
PMV BLD AUTO: 10.9 FL (ref 8.9–12.7)
RBC # BLD AUTO: 4.33 MILLION/UL (ref 3.81–5.12)
RH BLD: POSITIVE
SPECIMEN EXPIRATION DATE: NORMAL
THC UR QL: NEGATIVE
WBC # BLD AUTO: 14.22 THOUSAND/UL (ref 4.31–10.16)

## 2023-03-18 PROCEDURE — 4A1HXCZ MONITORING OF PRODUCTS OF CONCEPTION, CARDIAC RATE, EXTERNAL APPROACH: ICD-10-PCS | Performed by: STUDENT IN AN ORGANIZED HEALTH CARE EDUCATION/TRAINING PROGRAM

## 2023-03-18 RX ORDER — OXYTOCIN/RINGER'S LACTATE 30/500 ML
PLASTIC BAG, INJECTION (ML) INTRAVENOUS
Status: COMPLETED
Start: 2023-03-18 | End: 2023-03-18

## 2023-03-18 RX ORDER — OXYTOCIN/RINGER'S LACTATE 30/500 ML
1-30 PLASTIC BAG, INJECTION (ML) INTRAVENOUS
Status: DISCONTINUED | OUTPATIENT
Start: 2023-03-18 | End: 2023-03-19

## 2023-03-18 RX ORDER — SODIUM CHLORIDE, SODIUM LACTATE, POTASSIUM CHLORIDE, CALCIUM CHLORIDE 600; 310; 30; 20 MG/100ML; MG/100ML; MG/100ML; MG/100ML
125 INJECTION, SOLUTION INTRAVENOUS CONTINUOUS
Status: DISCONTINUED | OUTPATIENT
Start: 2023-03-18 | End: 2023-03-19

## 2023-03-18 RX ORDER — ONDANSETRON 2 MG/ML
4 INJECTION INTRAMUSCULAR; INTRAVENOUS EVERY 6 HOURS PRN
Status: DISCONTINUED | OUTPATIENT
Start: 2023-03-18 | End: 2023-03-19

## 2023-03-18 RX ORDER — BUPIVACAINE HYDROCHLORIDE 2.5 MG/ML
30 INJECTION, SOLUTION EPIDURAL; INFILTRATION; INTRACAUDAL ONCE AS NEEDED
Status: DISCONTINUED | OUTPATIENT
Start: 2023-03-18 | End: 2023-03-19

## 2023-03-18 RX ADMIN — SODIUM CHLORIDE, SODIUM LACTATE, POTASSIUM CHLORIDE, AND CALCIUM CHLORIDE 125 ML/HR: .6; .31; .03; .02 INJECTION, SOLUTION INTRAVENOUS at 21:28

## 2023-03-18 RX ADMIN — Medication 2 MILLI-UNITS/MIN: at 21:39

## 2023-03-18 RX ADMIN — ONDANSETRON 4 MG: 2 INJECTION INTRAMUSCULAR; INTRAVENOUS at 23:46

## 2023-03-18 NOTE — TELEPHONE ENCOUNTER
Regardin weeks/possible water break  ----- Message from Hector Alonzo sent at 3/18/2023  4:01 PM EDT -----  " I am past my due date at 40 weeks, just came home from the hospital about two hours ago and soaked through two pairs of underwear   I am not sure if this is my water breaking "

## 2023-03-18 NOTE — TELEPHONE ENCOUNTER
Reason for Disposition  • Patient sounds very sick or weak to the triager    Answer Assessment - Initial Assessment Questions  1  ONSET: "When did the symptoms begin?"       Completely soaked thinks her water broke some blood now   Large gush of fluid     2  CONTRACTIONS: "Describe the contractions that you are having " (e g , duration, frequency, regularity, severity)       Cramping     3  PABLO: "What date are you expecting to deliver?"        Patrickraymon 39 3 17 2023     4  PARITY: "Have you had a baby before?" If Yes, ask: "How long did the labor last?"     Parity     5  FETAL MOVEMENT: "Has the baby's movement decreased or changed significantly from normal?"     Yes   6   OTHER SYMPTOMS: "Do you have any other symptoms?" (e g , leaking fluid from vagina, vaginal bleeding, fever, hand/facial swelling)     Leakage of fluid    Protocols used: PREGNANCY - LABOR-ADULT-

## 2023-03-18 NOTE — H&P
H&P Exam - Obstetrics   Jose King 25 y o  female MRN: [id number]  Unit/Bed#: LD TRIAGE 2 Encounter: [id number]      History of Present Illness     Chief Complaint: PROM    HPI:  Jose King is a 25 y o   female with an PABLO of 3/17/2023, by Last Menstrual Period at 40w1d weeks gestation who is being admitted for SROM vs PROM  She reports large gush of clear fluid around 3pm yesterday with ongoing leakage since that time  She denies other new concerns  Contractions: some, mild  Loss of fluid: yes  Vaginal bleeding: no  Fetal movement: yes    She is a [facility] patient  PREGNANCY COMPLICATIONS:   1) exercise-induced asthma  2) anxiety and depression    OB History    Para Term  AB Living   1 0 0 0 0 0   SAB IAB Ectopic Multiple Live Births   0 0 0 0 0      # Outcome Date GA Lbr Carlos/2nd Weight Sex Delivery Anes PTL Lv   1 Current                Baby complications/comments: none    Review of Systems   Constitutional: Negative for fever  HENT: Negative for rhinorrhea, sinus pressure, sneezing and sore throat  Eyes: Negative for visual disturbance  Respiratory: Negative for cough, shortness of breath and wheezing  Cardiovascular: Negative for chest pain, palpitations and leg swelling  Gastrointestinal: Negative for abdominal distention, abdominal pain, blood in stool, nausea and vomiting  Genitourinary: Positive for vaginal discharge  Negative for dysuria, flank pain and vaginal bleeding  Musculoskeletal: Negative for neck pain and neck stiffness  Skin: Negative for color change, pallor and rash  Neurological: Negative for light-headedness, numbness and headaches           Historical Information   Past Medical History:   Diagnosis Date   • Anxiety     Dc'd Zoloft 1 1/2 years ago, states handles anxiety better   • Depression    • Lyme disease     Last assessed 3/17/2017   • Psychiatric disorder     Depression/Anxiety     Past Surgical History:   Procedure Laterality Date   • NO PAST SURGERIES       Social History   Social History     Substance and Sexual Activity   Alcohol Use Not Currently    Comment: socially--not currently due to pregnancy     Social History     Substance and Sexual Activity   Drug Use Not Currently   • Types: Marijuana    Comment: occasionally--stopped since pregnancy     Social History     Tobacco Use   Smoking Status Former   • Packs/day: 0 25   • Types: Cigarettes   • Quit date: 2022   • Years since quittin 7   Smokeless Tobacco Never   Tobacco Comments    Discontinued without diffiulty     Family History: non-contributory    Meds/Allergies      Medications Prior to Admission   Medication   • Prenatal Vit-Iron Carbonyl-FA (prenatal multivitamin) TABS      No Known Allergies    OBJECTIVE:    Vitals: Blood pressure 129/77, pulse 68, temperature 98 3 °F (36 8 °C), temperature source Oral, resp  rate 18, last menstrual period 06/10/2022  There is no height or weight on file to calculate BMI  Physical Exam  Exam conducted with a chaperone present  Constitutional:       General: She is not in acute distress  Appearance: She is well-developed  She is not diaphoretic  HENT:      Head: Normocephalic and atraumatic  Eyes:      Pupils: Pupils are equal, round, and reactive to light  Cardiovascular:      Rate and Rhythm: Normal rate  Pulses: Normal pulses  Pulmonary:      Effort: Pulmonary effort is normal  No respiratory distress  Abdominal:      General: There is no distension  Palpations: Abdomen is soft  There is no mass  Tenderness: There is no abdominal tenderness  There is no guarding  Comments: gravid   Genitourinary:     General: Normal vulva  Comments: Wicho Pi rupture appreciated  No membranes palpated on SVE  Musculoskeletal:         General: No deformity  Normal range of motion  Cervical back: Normal range of motion  Skin:     General: Skin is warm and dry     Neurological:      General: No focal deficit present  Mental Status: She is alert  Mental status is at baseline  Psychiatric:         Behavior: Behavior normal            Nitrazine: positive    TAUS: Vertex    Cervix:   2/80/-1, posterior    Fetal heart rate:   Baseline Rate: 130 bpm  Variability: Moderate 6-25 bpm  Accelerations: 15 x 15 or greater  Decelerations: none  FHR Category: Category I    [place]:    regular    EFW: 7lbs    GBS: Negative    Prenatal Labs:   Blood Type:   Lab Results   Component Value Date/Time    ABO Grouping O 2023 06:03 PM     , Group B Strep:    Lab Results   Component Value Date/Time    Strep Grp B PCR Negative 2023 03:00 PM          Invasive Devices     None                   Assessment/Plan     ASSESSMENT:  21yo  at 40w1d weeks gestation who is being admitted for PROM vs SROM at 3PM    PLAN:   1) Admit   2) CBC, RPR, Blood Type   3) Start with expectant management   4) GBS negative status: no PCN for prophylaxis    5) Analgesia and/or epidural at patient request   6) Anticipate    7) Discussed with Dr Aga Caruso    This patient will be an INPATIENT  and I certify the anticipated length of stay is >2 Midnights      Paul Weiner MD  3/18/2023  5:57 PM    3pm ROM clear

## 2023-03-18 NOTE — PROGRESS NOTES
L&D Triage Note - OB/GYN  Nathan Del Castillo 25 y o  female MRN: [id number]  Unit/Bed#: LD TRIAGE 3-01 Encounter: [id number]      Assessment:  25 y o   at 40w1d presenting with contractions  Patient was found not to be in labor discharged home with labor precautions  Plan:  1  Contractions   NST reactive   [place] showing regular contractions   Normotensive blood pressure   Cervical exam 280/-2, unchanged from prior exam   Patient already scheduled for induction of labor on 3/20/2023 at 3 PM   Reviewed labor precautions   Discharged home   Discussed with Dr Kenia Steward    ______________________________________________________________________      Chief Complaint: contractions    Subjective:  25 y o  Aleksandr Paul at 40w1d presenting with concern for contractions  She is unsure if they are true contractions because she is having a lot of back pain  She denies leakage of fluid, vaginal bleeding, and decreased fetal movement  She has had some light spotting  Her pregnancy has been uncomplicated  She denies a significant medical history  ROS:   Review of Systems   Constitutional: Negative for chills and fever  HENT: Negative for ear pain and sore throat  Eyes: Negative for pain and visual disturbance  Respiratory: Negative for cough and shortness of breath  Cardiovascular: Negative for chest pain and palpitations  Gastrointestinal: Negative for abdominal pain and vomiting  Genitourinary: Negative for dysuria and hematuria  Musculoskeletal: Positive for back pain  Negative for arthralgias  Skin: Negative for color change and rash  Neurological: Negative for seizures and syncope  All other systems reviewed and are negative  Objective:  Vitals:    23 0600   BP: 127/61   Pulse: 72   Resp: 18       Physical Exam  Constitutional:       Appearance: Normal appearance  Genitourinary:      Vulva normal    Cardiovascular:      Rate and Rhythm: Normal rate  Pulses: Normal pulses  Pulmonary:      Effort: Pulmonary effort is normal  No respiratory distress  Abdominal:      Palpations: Abdomen is soft  Tenderness: There is no abdominal tenderness  Neurological:      Mental Status: She is alert  Skin:     General: Skin is warm and dry  Psychiatric:         Mood and Affect: Mood normal          Behavior: Behavior normal    Vitals reviewed            SVE: 2 / 80% / -2   FHT: reactive  [place]: irregular    Alek Edwards MD 3/18/2023 6:47 AM

## 2023-03-18 NOTE — TELEPHONE ENCOUNTER
Patient called in 40 weeks with SROM large gush of fluid  TT sent to on call  Patient sent to [facility] for evaluation

## 2023-03-18 NOTE — TELEPHONE ENCOUNTER
Reason for Disposition  • Vaginal bleeding or spotting (Exception: brief spotting after intercourse or pelvic exam)    Answer Assessment - Initial Assessment Questions  1  ONSET: "When did the symptoms begin?"         3/18    2  CONTRACTIONS: "Describe the contractions that you are having " (e g , duration, frequency, regularity, severity)  " Slight cramping" and discomfort  Unsure how often    3  PABLO: "What date are you expecting to deliver?"      3/20 induction scheduled    4  PARITY: "Have you had a baby before?" If Yes, ask: "How long did the labor last?"  First baby    5  FETAL MOVEMENT: "Has the baby's movement decreased or changed significantly from normal?"  Good FM    6  OTHER SYMPTOMS: "Do you have any other symptoms?" (e g , leaking fluid from vagina, vaginal bleeding, fever, hand/facial swelling)    Having pink vaginal spotting    Protocols used: PREGNANCY - LABOR-ADULT-    Instructed patient to go to [facility] L&D unit for evaluation  She verbalized understanding     On call provider and charge RN notified of patients symptoms and arrival

## 2023-03-19 LAB
BASE EXCESS BLDCOA CALC-SCNC: -4.7 MMOL/L (ref 3–11)
BASE EXCESS BLDCOV CALC-SCNC: -4.9 MMOL/L (ref 1–9)
HCO3 BLDCOA-SCNC: 23.8 MMOL/L (ref 17.3–27.3)
HCO3 BLDCOV-SCNC: 22.2 MMOL/L (ref 12.2–28.6)
O2 CT VFR BLDCOA CALC: 9 ML/DL
OXYHGB MFR BLDCOA: 36.2 %
OXYHGB MFR BLDCOV: 51.9 %
PCO2 BLDCOA: 56.8 MM[HG] (ref 30–60)
PCO2 BLDCOV: 48.3 MM HG (ref 27–43)
PH BLDCOA: 7.24 [PH] (ref 7.23–7.43)
PH BLDCOV: 7.28 [PH] (ref 7.19–7.49)
PO2 BLDCOA: 19.5 MM HG (ref 5–25)
PO2 BLDCOV: 23.7 MM HG (ref 15–45)
SAO2 % BLDCOV: 13 ML/DL
TREPONEMA PALLIDUM IGG+IGM AB [PRESENCE] IN SERUM OR PLASMA BY IMMUNOASSAY: NORMAL

## 2023-03-19 RX ORDER — TRANEXAMIC ACID 10 MG/ML
INJECTION, SOLUTION INTRAVENOUS
Status: COMPLETED
Start: 2023-03-19 | End: 2023-03-19

## 2023-03-19 RX ORDER — LIDOCAINE HYDROCHLORIDE 10 MG/ML
INJECTION, SOLUTION EPIDURAL; INFILTRATION; INTRACAUDAL; PERINEURAL
Status: COMPLETED | OUTPATIENT
Start: 2023-03-19 | End: 2023-03-19

## 2023-03-19 RX ORDER — DOCUSATE SODIUM 100 MG/1
100 CAPSULE, LIQUID FILLED ORAL 2 TIMES DAILY
Status: DISCONTINUED | OUTPATIENT
Start: 2023-03-19 | End: 2023-03-20 | Stop reason: HOSPADM

## 2023-03-19 RX ORDER — ROPIVACAINE HYDROCHLORIDE 2 MG/ML
INJECTION, SOLUTION EPIDURAL; INFILTRATION; PERINEURAL AS NEEDED
Status: DISCONTINUED | OUTPATIENT
Start: 2023-03-19 | End: 2023-03-19 | Stop reason: HOSPADM

## 2023-03-19 RX ORDER — ONDANSETRON 2 MG/ML
4 INJECTION INTRAMUSCULAR; INTRAVENOUS EVERY 8 HOURS PRN
Status: DISCONTINUED | OUTPATIENT
Start: 2023-03-19 | End: 2023-03-20 | Stop reason: HOSPADM

## 2023-03-19 RX ORDER — OXYTOCIN/RINGER'S LACTATE 30/500 ML
250 PLASTIC BAG, INJECTION (ML) INTRAVENOUS ONCE
Status: DISCONTINUED | OUTPATIENT
Start: 2023-03-19 | End: 2023-03-20 | Stop reason: HOSPADM

## 2023-03-19 RX ORDER — DIPHENHYDRAMINE HCL 25 MG
25 TABLET ORAL EVERY 6 HOURS PRN
Status: DISCONTINUED | OUTPATIENT
Start: 2023-03-19 | End: 2023-03-20 | Stop reason: HOSPADM

## 2023-03-19 RX ORDER — ACETAMINOPHEN 325 MG/1
650 TABLET ORAL EVERY 4 HOURS PRN
Status: DISCONTINUED | OUTPATIENT
Start: 2023-03-19 | End: 2023-03-20 | Stop reason: HOSPADM

## 2023-03-19 RX ORDER — CALCIUM CARBONATE 200(500)MG
1000 TABLET,CHEWABLE ORAL DAILY PRN
Status: DISCONTINUED | OUTPATIENT
Start: 2023-03-19 | End: 2023-03-20 | Stop reason: HOSPADM

## 2023-03-19 RX ORDER — DIAPER,BRIEF,INFANT-TODD,DISP
1 EACH MISCELLANEOUS DAILY PRN
Status: DISCONTINUED | OUTPATIENT
Start: 2023-03-19 | End: 2023-03-20 | Stop reason: HOSPADM

## 2023-03-19 RX ORDER — IBUPROFEN 600 MG/1
600 TABLET ORAL EVERY 6 HOURS
Status: DISCONTINUED | OUTPATIENT
Start: 2023-03-19 | End: 2023-03-20 | Stop reason: HOSPADM

## 2023-03-19 RX ADMIN — IBUPROFEN 600 MG: 600 TABLET, FILM COATED ORAL at 17:20

## 2023-03-19 RX ADMIN — IBUPROFEN 600 MG: 600 TABLET, FILM COATED ORAL at 11:54

## 2023-03-19 RX ADMIN — LIDOCAINE HYDROCHLORIDE 5 ML: 10 INJECTION, SOLUTION EPIDURAL; INFILTRATION; INTRACAUDAL at 00:15

## 2023-03-19 RX ADMIN — ACETAMINOPHEN 650 MG: 325 TABLET ORAL at 23:23

## 2023-03-19 RX ADMIN — SODIUM CHLORIDE, SODIUM LACTATE, POTASSIUM CHLORIDE, AND CALCIUM CHLORIDE 999 ML/HR: .6; .31; .03; .02 INJECTION, SOLUTION INTRAVENOUS at 00:17

## 2023-03-19 RX ADMIN — TRANEXAMIC ACID 1000 MG: 10 INJECTION, SOLUTION INTRAVENOUS at 02:29

## 2023-03-19 RX ADMIN — DOCUSATE SODIUM 100 MG: 100 CAPSULE, LIQUID FILLED ORAL at 17:20

## 2023-03-19 RX ADMIN — IBUPROFEN 600 MG: 600 TABLET, FILM COATED ORAL at 05:22

## 2023-03-19 RX ADMIN — HYDROCORTISONE 1 APPLICATION.: 10 CREAM TOPICAL at 05:23

## 2023-03-19 RX ADMIN — WITCH HAZEL 1 PAD.: 500 SOLUTION RECTAL; TOPICAL at 05:23

## 2023-03-19 RX ADMIN — IBUPROFEN 600 MG: 600 TABLET, FILM COATED ORAL at 23:23

## 2023-03-19 RX ADMIN — DOCUSATE SODIUM 100 MG: 100 CAPSULE, LIQUID FILLED ORAL at 08:54

## 2023-03-19 RX ADMIN — Medication: at 00:25

## 2023-03-19 RX ADMIN — Medication 1 APPLICATION.: at 05:23

## 2023-03-19 RX ADMIN — ROPIVACAINE HYDROCHLORIDE 10 ML: 2 INJECTION, SOLUTION EPIDURAL; INFILTRATION at 00:15

## 2023-03-19 NOTE — OB LABOR/OXYTOCIN SAFETY PROGRESS
Oxytocin Safety Progress Check Note - Jeni Yang 25 y o  female MRN: [id number]    Unit/Bed#: -01 Encounter: [id number]    Dose (alondra-units/min) Oxytocin: 6 alondra-units/min  Contraction Frequency (minutes): 1-4  Contraction Quality: Moderate  Tachysystole: No   Cervical Dilation: 8        Cervical Effacement: 100  Fetal Station: 1  Baseline Rate: 135 bpm  Fetal Heart Rate: 125 BPM  FHR Category: Category I               Vital Signs:   Vitals:    03/18/23 2330   BP: 138/77   Pulse: 81   Resp: 20   Temp:        Notes/comments:   SVE as above  Patient is currently comfortable with epidural   Continue pitocin titration    Discussed with Dr Magdiel Shirley MD 3/19/2023 12:40 AM

## 2023-03-19 NOTE — L&D DELIVERY NOTE
DELIVERY NOTE  Malgorzata Gibbons 25 y o  female MRN: [id number]  Unit/Bed#: - Encounter: [id number]    Obstetrician:    Dr Sara Bartlett MD    Assistant:   Dr Rudean Fabry, MD    Pre-Delivery Diagnosis:   Patient Active Problem List   Diagnosis   • Exercise-induced asthma   • Anxiety and depression   • Body mass index (BMI) less than 19 in adult   • Marijuana abuse   • Mild protein-calorie malnutrition ([address] )   •  (spontaneous vaginal delivery)   • Encounter for supervision of normal first pregnancy in third trimester   • Abdominal cramping affecting pregnancy         Post-Delivery Diagnosis:   Same as above - Delivered      Procedure:  Spontaneous vaginal delivery      Anesthesia:  epidural    Specimens:   Cord blood obtained   Placenta; normal appearing, central insertion, intact   Arterial and venous blood gases (below)     Gases:  Umbilical Cord Venous Blood Gas:  Results from last 7 days   Lab Units 23   PH COV  7 281   PCO2 COV mm HG 48 3*   HCO3 COV mmol/L 22 2   BASE EXC COV mmol/L -4 9*   O2 CT CD VB mL/dL 13 0   O2 HGB, VENOUS CORD % 93 4     Umbilical Cord Arterial Blood Gas:  Results from last 7 days   Lab Units 23   PH COA  7 240   PCO2 COA  56 8   PO2 COA mm HG 19 5   HCO3 COA mmol/L 23 8   BASE EXC COA mmol/L -4 7*   O2 CONTENT CORD ART ml/dl 9 0   O2 HGB, ARTERIAL CORD % 36 2       Quantitative Blood Loss:   65 mL           Complications:    None    Brief Description of Labor Course:  Malgorzata Gibbons is a 25 y o   female at 40w1d who was admitted to L&D for premature rupture of membranes  On initial cervical exam she was found to be2/80/-1  She failed to make consistent cervical change and was therefore started on a Pitocin titration  She received an epidural for analgesia  She progressed to complete at 0143, pushed for 17 min, and delivered a healthy  at 65       Description of Delivery:   With  the assistance of maternal expulsive forces, the fetal vertex delivered spontaneously  A nuchal cord was not noted  The anterior right shoulder was delivered atraumatically with gentle downward traction  The contralateral arm was delivered with gentle upward traction  The remainder of the fetus delivered spontaneously at 65, resulting in a viable male   Upon delivery, the infant was placed on the mothers abdomen and the cord was doubly clamped and cut after 30 seconds  The  was noted to have good tone and cry spontaneously  There was no evidence of injury  The  was passed off to  staff for evaluation  Umbilical cord blood and umbilical artery and venous gases were collected and sent to the lab  An intact placenta was delivered spontaneously at 0221 using fundal massage and gentle cord traction and was noted to have a centrally-inserted 3-vessel cord  Active management of the third stage of labor was undertaken with IV pitocin at 250milliunits/min  Inspection of the perineum, vagina, labia, cervix, and urethra revealed a none laceration  Bleeding was noted to be under control  A bimanual exam was performed   There was a suspected placental abruption and therefore TXA was given      Outcome:  Living  with APGARS 8 (1 min) and 9 (5 min)   weight: pending      At the conclusion of the delivery, all needle, sponge, and instrument counts were noted to be correct  Patient tolerated the procedure well and was allowed to recover in labor and delivery room with family and  before being transferred to the post-partum floor  Conclusion:  Mother and baby are currently recovering nicely in stable condition  Attending Supervision:   Dr Amaris Casiano MD was present for the entire procedure      Alison Puente MD  OB/GYN PGY-1   3/19/2023 2:33 AM

## 2023-03-19 NOTE — PLAN OF CARE
Problem: POSTPARTUM  Goal: Experiences normal postpartum course  Description: INTERVENTIONS:  - Monitor maternal vital signs  - Assess uterine involution and lochia  Outcome: Progressing  Goal: Appropriate maternal -  bonding  Description: INTERVENTIONS:  - Identify family support  - Assess for appropriate maternal/infant bonding   -Encourage maternal/infant bonding opportunities  - Referral to  or  as needed  Outcome: Progressing  Goal: Establishment of infant feeding pattern  Description: INTERVENTIONS:  - Assess breast/bottle feeding  - Refer to lactation as needed  Outcome: Progressing  Goal: Incision(s), wounds(s) or drain site(s) healing without S/S of infection  Description: INTERVENTIONS  - Assess and document dressing, incision, wound bed, drain sites and surrounding tissue  - Provide patient and family education  Outcome: Progressing     Problem: PAIN - ADULT  Goal: Verbalizes/displays adequate comfort level or baseline comfort level  Description: Interventions:  - Encourage patient to monitor pain and request assistance  - Assess pain using appropriate pain scale  - Administer analgesics based on type and severity of pain and evaluate response  - Implement non-pharmacological measures as appropriate and evaluate response  - Consider cultural and social influences on pain and pain management  - Notify physician/advanced practitioner if interventions unsuccessful or patient reports new pain  Outcome: Progressing     Problem: INFECTION - ADULT  Goal: Absence or prevention of progression during hospitalization  Description: INTERVENTIONS:  - Assess and monitor for signs and symptoms of infection  - Monitor lab/diagnostic results  - Monitor all insertion sites, i e  indwelling lines, tubes, and drains  - Monitor endotracheal if appropriate and nasal secretions for changes in amount and color  - [place] appropriate cooling/warming therapies per order  - Administer medications as ordered  - Instruct and encourage patient and family to use good hand hygiene technique  - Identify and instruct in appropriate isolation precautions for identified infection/condition  Outcome: Progressing  Goal: Absence of fever/infection during neutropenic period  Description: INTERVENTIONS:  - Monitor WBC    Outcome: Progressing     Problem: SAFETY ADULT  Goal: Patient will remain free of falls  Description: INTERVENTIONS:  - Educate patient/family on patient safety including physical limitations  - Instruct patient to call for assistance with activity   - Consult OT/PT to assist with strengthening/mobility   - Keep Call bell within reach  - Keep bed low and locked with side rails adjusted as appropriate  - Keep care items and personal belongings within reach  - Initiate and maintain comfort rounds  - Apply yellow socks and bracelet for high fall risk patients  - Consider moving patient to room near nurses station  Outcome: Progressing  Goal: Maintain or return to baseline ADL function  Description: INTERVENTIONS:  -  Assess patient's ability to carry out ADLs; assess patient's baseline for ADL function and identify physical deficits which impact ability to perform ADLs (bathing, care of mouth/teeth, toileting, grooming, dressing, etc )  - Assess/evaluate cause of self-care deficits   - Assess range of motion  - Assess patient's mobility; develop plan if impaired  - Assess patient's need for assistive devices and provide as appropriate  - Encourage maximum independence but intervene and supervise when necessary  - Involve family in performance of ADLs  - Assess for home care needs following discharge   - Consider OT consult to assist with ADL evaluation and planning for discharge  - Provide patient education as appropriate  Outcome: Progressing     Problem: Knowledge Deficit  Goal: Patient/family/caregiver demonstrates understanding of disease process, treatment plan, medications, and discharge instructions  Description: Complete learning assessment and assess knowledge base    Interventions:  - Provide teaching at level of understanding  - Provide teaching via preferred learning methods  Outcome: Progressing     Problem: DISCHARGE PLANNING  Goal: Discharge to home or other facility with appropriate resources  Description: INTERVENTIONS:  - Identify barriers to discharge w/patient and caregiver  - Arrange for needed discharge resources and transportation as appropriate  - Identify discharge learning needs (meds, wound care, etc )  - Arrange for interpretive services to assist at discharge as needed  - Refer to Case Management Department for coordinating discharge planning if the patient needs post-hospital services based on physician/advanced practitioner order or complex needs related to functional status, cognitive ability, or social support system  Outcome: Progressing

## 2023-03-19 NOTE — ANESTHESIA PROCEDURE NOTES
Epidural Block    Patient location during procedure: OB  Start time: 3/19/2023 12:03 AM  Reason for block: procedure for pain and at surgeon's request  Staffing  Performed: Anesthesiologist   Anesthesiologist: Stephanie Mary MD  Preanesthetic Checklist  Completed: patient identified, IV checked, site marked, risks and benefits discussed, surgical consent, monitors and equipment checked, pre-op evaluation and timeout performed  Epidural  Patient position: sitting  Prep: Betadine  Patient monitoring: heart rate, cardiac monitor, continuous pulse ox and frequent blood pressure checks  Approach: midline  Location: lumbar  Injection technique: ZORA air  Needle  Needle type: Tuohy   Needle gauge: 18 G  Catheter type: side hole  Catheter size: 20 G  Catheter at skin depth: 11 cm  Catheter securement method: surgical tape  Test dose: negativelidocaine (PF) (XYLOCAINE-MPF) 1 % - Infiltration   5 mL - 3/19/2023 12:15:00 AM  Assessment  Sensory level: T10  Number of attempts: 1negative aspiration for CSF and negative aspiration for heme  patient tolerated the procedure well with no immediate complications

## 2023-03-19 NOTE — CASE MANAGEMENT
Case Management Progress Note    Patient name Jr [place]  Location /-60 MRN [id number]  : 2000 Date 3/19/2023       LOS (days): 1  Geometric Mean LOS (GMLOS) (days):   Days to GMLOS:        OBJECTIVE:        Current admission status: Inpatient  Preferred Pharmacy:   [id number] [address] [address]  Phone: [phone] Fax: [address], [address] [place] [address]  Phone: [phone] Fax: [phone]    Primary Care Provider: Chloe Gao MD    Primary Insurance: BLUE CROSS  Secondary Insurance:     PROGRESS NOTE:  CM consulted for "Hx THC use  Mom's UDS negative"  Both mom and baby UDS negative for all substances tested  No need for CM follow up at this time

## 2023-03-19 NOTE — DISCHARGE INSTRUCTIONS
Vaginal Delivery   WHAT YOU SHOULD KNOW:   A vaginal delivery is the birth of your baby through your vagina (birth canal)  AFTER YOU LEAVE:   Medicines:  NSAIDs  help decrease swelling and pain or fever  This medicine is available with or without a doctor's order  NSAIDs can cause stomach bleeding or kidney problems in certain people  If you take blood thinner medicine, always ask your healthcare provider if NSAIDs are safe for you  Always read the medicine label and follow directions  Take your medicine as directed  Call your healthcare provider if you think your medicine is not helping or if you have side effects  Tell him if you are allergic to any medicine  Keep a list of the medicines, vitamins, and herbs you take  Include the amounts, and when and why you take them  Bring the list or the pill bottles to follow-up visits  Carry your medicine list with you in case of an emergency  Follow up with your primary healthcare provider:  Most women need to return 6 weeks after a vaginal delivery  Ask about how to care for your wounds or stitches  Write down your questions so you remember to ask them during your visits  Activity:  Rest as much as possible  Try to keep all activities short  You may be able to do some exercise soon after you have your baby  Talk with your primary healthcare provider before you start exercising  If you work outside the home, ask when you can return to your job  Kegel exercises:  Kegel exercises may help your vaginal and rectal muscles heal faster  You can do Kegel exercises by tightening and relaxing the muscles around your vagina  Kegel exercises help make the muscles stronger  Breast care:  When your milk comes in, your breasts may feel full and hard  Ask how to care for your breasts, even if you are not breastfeeding  Constipation:  Do not try to push the bowel movement out if it is too hard   High-fiber foods, extra liquids, and regular exercise can help you prevent constipation  Examples of high-fiber foods are fruit and bran  Prune juice and water are good liquids to drink  Regular exercise helps your digestive system work  You may also be told to take over-the-counter fiber and stool softener medicines  Take these items as directed  Hemorrhoids:  Pregnancy can cause severe hemorrhoids  You may have rectal pain because of the hemorrhoids  Ask how to prevent or treat hemorrhoids  Perineum care: Your perineum is the area between your vagina and anus  Keep the area clean and dry to help it heal and to prevent infection  Wash the area gently with soap and water when you bathe or shower  Rinse your perineum with warm water when you use the toilet  Your primary healthcare provider may suggest you use a warm sitz bath to help decrease pain  A sitz bath is a bathtub or basin filled to hip level  Stay in the sitz bath for 20 to 30 minutes, or as directed  Vaginal discharge: You will have vaginal discharge, called lochia, after your delivery  The lochia is bright red the first day or two after the birth  By the fourth day, the amount decreases, and it turns red-brown  Use a sanitary pad rather than a tampon to prevent a vaginal infection  It is normal to have lochia up to 8 weeks after your baby is born  Monthly periods: Your period may start again within 7 to 12 weeks after your baby is born  If you are breastfeeding, it may take longer for your period to start again  You can still get pregnant again even though you do not have your monthly period  Talk with your primary healthcare provider about a birth control method that will be good for you if you do not want to get pregnant  Mood changes: Many new mothers have some kind of mood changes after delivery  Some of these changes occur because of lack of sleep, hormone changes, and caring for a new baby  Some mood changes can be more serious, such as postpartum depression   Talk with your primary healthcare provider if you feel unable to care for yourself or your baby  Sexual activity:  You may need to avoid sex for 6 to 7 weeks after you have your baby  You may notice you have a decreased desire for sex, or sex may be painful  You may need to use a vaginal lubricant (gel) to help make sex more comfortable  Contact your primary healthcare provider if:   You have heavy vaginal bleeding that fills 1 or more sanitary pads in 1 hour  You have a fever  Your pain does not go away, or gets worse  The skin between your vagina and rectum is swollen, warm, or red  You have swollen, hard, or painful breasts  You feel very sad or depressed  You feel more tired than usual      You have questions or concerns about your condition or care  Seek care immediately or call 911 if:   You have pus or yellow drainage coming from your vagina or wound  You are urinating very little, or not at all  Your arm or leg feels warm, tender, and painful  It may look swollen and red  You feel lightheaded, have sudden and worsening chest pain, or trouble breathing  You may have more pain when you take deep breaths or cough, or you may cough up blood  © 2014 [address] is for End User's use only and may not be sold, redistributed or otherwise used for commercial purposes  All illustrations and images included in CareNotes® are the copyrighted property of [employer] , [employer]  or [employer]  The above information is an  only  It is not intended as medical advice for individual conditions or treatments  Talk to your doctor, nurse or pharmacist before following any medical regimen to see if it is safe and effective for you

## 2023-03-19 NOTE — PLAN OF CARE
Problem: POSTPARTUM  Goal: Experiences normal postpartum course  Description: INTERVENTIONS:  - Monitor maternal vital signs  - Assess uterine involution and lochia  3/19/2023 0329 by Gabbi Rivera RN  Outcome: Progressing  3/18/2023 2005 by Gabbi Rivera RN  Outcome: Progressing  Goal: Appropriate maternal -  bonding  Description: INTERVENTIONS:  - Identify family support  - Assess for appropriate maternal/infant bonding   -Encourage maternal/infant bonding opportunities  - Referral to  or  as needed  3/19/2023 0329 by Gabbi Rivera RN  Outcome: Progressing  3/18/2023 2005 by Gabbi Rivera RN  Outcome: Progressing  Goal: Establishment of infant feeding pattern  Description: INTERVENTIONS:  - Assess breast/bottle feeding  - Refer to lactation as needed  3/19/2023 0329 by Gabbi Rivera RN  Outcome: Progressing  3/18/2023 2005 by Gabbi Rivera RN  Outcome: Progressing      Problem: PAIN - ADULT  Goal: Verbalizes/displays adequate comfort level or baseline comfort level  Description: Interventions:  - Encourage patient to monitor pain and request assistance  - Assess pain using appropriate pain scale  - Administer analgesics based on type and severity of pain and evaluate response  - Implement non-pharmacological measures as appropriate and evaluate response  - Consider cultural and social influences on pain and pain management  - Notify physician/advanced practitioner if interventions unsuccessful or patient reports new pain  Outcome: Progressing     Problem: INFECTION - ADULT  Goal: Absence or prevention of progression during hospitalization  Description: INTERVENTIONS:  - Assess and monitor for signs and symptoms of infection  - Monitor lab/diagnostic results  - Monitor all insertion sites, i e  indwelling lines, tubes, and drains  - Monitor endotracheal if appropriate and nasal secretions for changes in amount and color  - [place] appropriate cooling/warming therapies per order  - Administer medications as ordered  - Instruct and encourage patient and family to use good hand hygiene technique  - Identify and instruct in appropriate isolation precautions for identified infection/condition  Outcome: Progressing  Goal: Absence of fever/infection during neutropenic period  Description: INTERVENTIONS:  - Monitor WBC    Outcome: Progressing     Problem: SAFETY ADULT  Goal: Patient will remain free of falls  Description: INTERVENTIONS:  - Educate patient/family on patient safety including physical limitations  - Instruct patient to call for assistance with activity   - Consult OT/PT to assist with strengthening/mobility   - Keep Call bell within reach  - Keep bed low and locked with side rails adjusted as appropriate  - Keep care items and personal belongings within reach  - Initiate and maintain comfort rounds  - Apply yellow socks and bracelet for high fall risk patients  - Consider moving patient to room near nurses station  Outcome: Progressing  Goal: Maintain or return to baseline ADL function  Description: INTERVENTIONS:  -  Assess patient's ability to carry out ADLs; assess patient's baseline for ADL function and identify physical deficits which impact ability to perform ADLs (bathing, care of mouth/teeth, toileting, grooming, dressing, etc )  - Assess/evaluate cause of self-care deficits   - Assess range of motion  - Assess patient's mobility; develop plan if impaired  - Assess patient's need for assistive devices and provide as appropriate  - Encourage maximum independence but intervene and supervise when necessary  - Involve family in performance of ADLs  - Assess for home care needs following discharge   - Consider OT consult to assist with ADL evaluation and planning for discharge  - Provide patient education as appropriate  Outcome: Progressing     Problem: Knowledge Deficit  Goal: Patient/family/caregiver demonstrates understanding of disease process, treatment plan, medications, and discharge instructions  Description: Complete learning assessment and assess knowledge base    Interventions:  - Provide teaching at level of understanding  - Provide teaching via preferred learning methods  Outcome: Progressing     Problem: DISCHARGE PLANNING  Goal: Discharge to home or other facility with appropriate resources  Description: INTERVENTIONS:  - Identify barriers to discharge w/patient and caregiver  - Arrange for needed discharge resources and transportation as appropriate  - Identify discharge learning needs (meds, wound care, etc )  - Arrange for interpretive services to assist at discharge as needed  - Refer to Case Management Department for coordinating discharge planning if the patient needs post-hospital services based on physician/advanced practitioner order or complex needs related to functional status, cognitive ability, or social support system  Outcome: Progressing

## 2023-03-19 NOTE — ANESTHESIA PREPROCEDURE EVALUATION
Procedure:  LABOR ANALGESIA    Relevant Problems   GYN   (+) 39 weeks gestation of pregnancy   (+) Encounter for supervision of normal first pregnancy in third trimester      NEURO/PSYCH   (+) Anxiety and depression      PULMONARY   (+) Exercise-induced asthma        Physical Exam    Airway    Mallampati score: II  TM Distance: >3 FB  Neck ROM: full     Dental   No notable dental hx     Cardiovascular  Cardiovascular exam normal    Pulmonary  Pulmonary exam normal     Other Findings        Anesthesia Plan  ASA Score- 2     Anesthesia Type- epidural with ASA Monitors  Additional Monitors:   Airway Plan:           Plan Factors-Exercise tolerance (METS): >4 METS  Chart reviewed  Existing labs reviewed  Induction-     Postoperative Plan-     Informed Consent- Anesthetic plan and risks discussed with patient  I personally reviewed this patient with the CRNA  Discussed and agreed on the Anesthesia Plan with the CRNA  Tommie Sarkar

## 2023-03-19 NOTE — OB LABOR/OXYTOCIN SAFETY PROGRESS
Oxytocin Safety Progress Check Note - Reese Staley 25 y o  female MRN: [id number]    Unit/Bed#: -01 Encounter: [id number]    Dose (alondra-units/min) Oxytocin: 6 alondra-units/min  Contraction Frequency (minutes): 1-4  Contraction Quality: Moderate  Tachysystole: No   Cervical Dilation: 4        Cervical Effacement: 100  Fetal Station: -1  Baseline Rate: 135 bpm  Fetal Heart Rate: 140 BPM  FHR Category: Category I               Vital Signs:   Vitals:    03/18/23 2300   BP: 139/79   Pulse: 82   Resp:    Temp: 97 5 °F (36 4 °C)       Notes/comments:   SVE as above  Patient is currently uncomfortable without epidural  Requesting stadol at this time  Continue pitocin titration     Discussed with Dr Tracy De Paz MD 3/18/2023 11:30 PM

## 2023-03-19 NOTE — PLAN OF CARE
Problem: BIRTH - VAGINAL/ SECTION  Goal: Fetal and maternal status remain reassuring during the birth process  Description: INTERVENTIONS:  - Monitor vital signs  - Monitor fetal heart rate  - Monitor uterine activity  - Monitor labor progression (vaginal delivery)  - DVT prophylaxis  - Antibiotic prophylaxis  Outcome: Progressing  Goal: Emotionally satisfying birthing experience for mother/fetus  Description: Interventions:  - Assess, plan, implement and evaluate the nursing care given to the patient in labor  - Advocate the philosophy that each childbirth experience is a unique experience and support the family's chosen level of involvement and control during the labor process   - Actively participate in both the patient's and family's teaching of the birth process  - Consider cultural, Confucianist and age-specific factors and plan care for the patient in labor  Outcome: Progressing     Problem: POSTPARTUM  Goal: Experiences normal postpartum course  Description: INTERVENTIONS:  - Monitor maternal vital signs  - Assess uterine involution and lochia  Outcome: Progressing  Goal: Appropriate maternal -  bonding  Description: INTERVENTIONS:  - Identify family support  - Assess for appropriate maternal/infant bonding   -Encourage maternal/infant bonding opportunities  - Referral to  or  as needed  Outcome: Progressing  Goal: Establishment of infant feeding pattern  Description: INTERVENTIONS:  - Assess breast/bottle feeding  - Refer to lactation as needed  Outcome: Progressing  Goal: Incision(s), wounds(s) or drain site(s) healing without S/S of infection  Description: INTERVENTIONS  - Assess and document dressing, incision, wound bed, drain sites and surrounding tissue  - Provide patient and family education    Outcome: Progressing     Problem: Knowledge Deficit  Goal: Verbalizes understanding of labor plan  Description: Assess patient/family/caregiver's baseline knowledge level and ability to understand information  Provide education via patient/family/caregiver's preferred learning method at appropriate level of understanding  1  Provide teaching at level of understanding  2  Provide teaching via preferred learning method(s)  Outcome: Progressing     Problem: Labor & Delivery  Goal: Manages discomfort  Description: Assess and monitor for signs and symptoms of discomfort  Assess patient's pain level regularly and per hospital policy  Administer medications as ordered  Support use of nonpharmacological methods to help control pain such as distraction, imagery, relaxation, and application of heat and cold  Collaborate with interdisciplinary team and patient to determine appropriate pain management plan  1  Include patient in decisions related to comfort  2  Offer non-pharmacological pain management interventions  3  Report ineffective pain management to physician  Outcome: Progressing  Goal: Patient vital signs are stable  Description: 1  Assess vital signs - vaginal delivery    Outcome: Progressing

## 2023-03-19 NOTE — PLAN OF CARE
Problem: BIRTH - VAGINAL/ SECTION  Goal: Fetal and maternal status remain reassuring during the birth process  Description: INTERVENTIONS:  - Monitor vital signs  - Monitor fetal heart rate  - Monitor uterine activity  - Monitor labor progression (vaginal delivery)  - DVT prophylaxis  - Antibiotic prophylaxis  3/19/2023 0327 by Gilles Cosby RN  Outcome: Completed  3/18/2023 2005 by Gilles Cosby RN  Outcome: Progressing  Goal: Emotionally satisfying birthing experience for mother/fetus  Description: Interventions:  - Assess, plan, implement and evaluate the nursing care given to the patient in labor  - Advocate the philosophy that each childbirth experience is a unique experience and support the family's chosen level of involvement and control during the labor process   - Actively participate in both the patient's and family's teaching of the birth process  - Consider cultural, Latter day and age-specific factors and plan care for the patient in labor  3/19/2023 0327 by Gilles Cosby RN  Outcome: Completed  3/18/2023 2005 by Gilles Cosby RN  Outcome: Progressing     Problem: Labor & Delivery  Goal: Manages discomfort  Description: Assess and monitor for signs and symptoms of discomfort  Assess patient's pain level regularly and per hospital policy  Administer medications as ordered  Support use of nonpharmacological methods to help control pain such as distraction, imagery, relaxation, and application of heat and cold  Collaborate with interdisciplinary team and patient to determine appropriate pain management plan  1  Include patient in decisions related to comfort  2  Offer non-pharmacological pain management interventions  3  Report ineffective pain management to physician  3/19/2023 0327 by Gilles Cosby RN  Outcome: Completed  3/18/2023 2005 by Gilles Cosby RN  Outcome: Progressing  Goal: Patient vital signs are stable  Description: 1   Assess vital signs - vaginal delivery  3/19/2023 0327 by Nida Berg RN  Outcome: Completed  3/18/2023 2005 by Nida Berg RN  Outcome: Progressing     Problem: Knowledge Deficit  Goal: Verbalizes understanding of labor plan  Description: Assess patient/family/caregiver's baseline knowledge level and ability to understand information  Provide education via patient/family/caregiver's preferred learning method at appropriate level of understanding  1  Provide teaching at level of understanding  2  Provide teaching via preferred learning method(s)    3/19/2023 0327 by Nida eBrg RN  Outcome: Completed  3/18/2023 2005 by Nida Berg RN  Outcome: Progressing

## 2023-03-19 NOTE — ADDENDUM NOTE
Addendum  created 03/19/23 1051 by Jonelle Alvarado CRNA    Clinical Note Signed, Intraprocedure Event edited, LDA properties accepted

## 2023-03-19 NOTE — PLAN OF CARE
Problem: POSTPARTUM  Goal: Experiences normal postpartum course  Description: INTERVENTIONS:  - Monitor maternal vital signs  - Assess uterine involution and lochia  Outcome: Progressing  Goal: Appropriate maternal -  bonding  Description: INTERVENTIONS:  - Identify family support  - Assess for appropriate maternal/infant bonding   -Encourage maternal/infant bonding opportunities  - Referral to  or  as needed  Outcome: Progressing  Goal: Establishment of infant feeding pattern  Description: INTERVENTIONS:  - Assess breast/bottle feeding  - Refer to lactation as needed  Outcome: Progressing  Goal: Incision(s), wounds(s) or drain site(s) healing without S/S of infection  Description: INTERVENTIONS  - Assess and document dressing, incision, wound bed, drain sites and surrounding tissue  - Provide patient and family education  - Perform skin care/dressing changes every   Outcome: Progressing     Problem: PAIN - ADULT  Goal: Verbalizes/displays adequate comfort level or baseline comfort level  Description: Interventions:  - Encourage patient to monitor pain and request assistance  - Assess pain using appropriate pain scale  - Administer analgesics based on type and severity of pain and evaluate response  - Implement non-pharmacological measures as appropriate and evaluate response  - Consider cultural and social influences on pain and pain management  - Notify physician/advanced practitioner if interventions unsuccessful or patient reports new pain  Outcome: Progressing     Problem: INFECTION - ADULT  Goal: Absence or prevention of progression during hospitalization  Description: INTERVENTIONS:  - Assess and monitor for signs and symptoms of infection  - Monitor lab/diagnostic results  - Monitor all insertion sites, i e  indwelling lines, tubes, and drains  - Monitor endotracheal if appropriate and nasal secretions for changes in amount and color  - [place] appropriate cooling/warming therapies per order  - Administer medications as ordered  - Instruct and encourage patient and family to use good hand hygiene technique  - Identify and instruct in appropriate isolation precautions for identified infection/condition  Outcome: Progressing  Goal: Absence of fever/infection during neutropenic period  Description: INTERVENTIONS:  - Monitor WBC    Outcome: Progressing     Problem: SAFETY ADULT  Goal: Patient will remain free of falls  Description: INTERVENTIONS:  - Educate patient/family on patient safety including physical limitations  - Instruct patient to call for assistance with activity   - Consult OT/PT to assist with strengthening/mobility   - Keep Call bell within reach  - Keep bed low and locked with side rails adjusted as appropriate  - Keep care items and personal belongings within reach  - Initiate and maintain comfort rounds  - Make Fall Risk Sign visible to staff  - Offer Toileting every  Hours, in advance of need  - Initiate/Maintain alarm  - Obtain necessary fall risk management equipment  - Apply yellow socks and bracelet for high fall risk patients  - Consider moving patient to room near nurses station  Outcome: Progressing  Goal: Maintain or return to baseline ADL function  Description: INTERVENTIONS:  -  Assess patient's ability to carry out ADLs; assess patient's baseline for ADL function and identify physical deficits which impact ability to perform ADLs (bathing, care of mouth/teeth, toileting, grooming, dressing, etc )  - Assess/evaluate cause of self-care deficits   - Assess range of motion  - Assess patient's mobility; develop plan if impaired  - Assess patient's need for assistive devices and provide as appropriate  - Encourage maximum independence but intervene and supervise when necessary  - Involve family in performance of ADLs  - Assess for home care needs following discharge   - Consider OT consult to assist with ADL evaluation and planning for discharge  - Provide patient education as appropriate  Outcome: Progressing     Problem: Knowledge Deficit  Goal: Patient/family/caregiver demonstrates understanding of disease process, treatment plan, medications, and discharge instructions  Description: Complete learning assessment and assess knowledge base    Interventions:  - Provide teaching at level of understanding  - Provide teaching via preferred learning methods  Outcome: Progressing     Problem: DISCHARGE PLANNING  Goal: Discharge to home or other facility with appropriate resources  Description: INTERVENTIONS:  - Identify barriers to discharge w/patient and caregiver  - Arrange for needed discharge resources and transportation as appropriate  - Identify discharge learning needs (meds, wound care, etc )  - Arrange for interpretive services to assist at discharge as needed  - Refer to Case Management Department for coordinating discharge planning if the patient needs post-hospital services based on physician/advanced practitioner order or complex needs related to functional status, cognitive ability, or social support system  Outcome: Progressing

## 2023-03-19 NOTE — DISCHARGE SUMMARY
Discharge Summary - Hitesh Daley 25 y o  female MRN: [id number]    Unit/Bed#: LD  Encounter: [id number]    Admission Date: 3/18/2023     Discharge Date: 3/20/2023    Patient Active Problem List   Diagnosis   • Exercise-induced asthma   • Anxiety and depression   • Body mass index (BMI) less than 19 in adult   • Marijuana abuse   • Mild protein-calorie malnutrition ([address] )   •  (spontaneous vaginal delivery)   • Encounter for supervision of normal first pregnancy in third trimester   • Abdominal cramping affecting pregnancy         [facility]N Practice: [facility] Course:     Hitesh Daley is a 25 y o  Maritza Garb who was admitted at 40w2d for remature rupture of membranes  On initial cervical exam she was found to be2/80/-1  She failed to make consistent cervical change and was therefore started on a Pitocin titration  She received an epidural for analgesia  She delivered a viable male  on 3/19/2023 at 0217  Weight 7lbs 10 1oz via spontaneous vaginal delivery  Apgars were 8 (1 min) and 9 (5 min)   was transferred to  nursery  Patient tolerated the procedure well and was transferred to recovery in stable condition  Was suspicious for abruption therefore she received a dose of TXA  Her post-partum course was uncomplicated  Her post-partum pain was well controlled with oral analgesics  On day of discharge, she was ambulating and able to reasonably perform all ADLs  She was voiding and had appropriate bowel function  Pain was well controlled  She was discharged home on postpartum day #1 without complications  Patient was instructed to follow up with her OB as an outpatient and was given appropriate warnings to call doctor or provider if she develops signs of infection or uncontrolled pain  On day of discharge she was ambulating, voiding spontaneously, tolerating oral intake and hemodynamically stable   Mom's blood type is O positive and  Rhogam was not given     Disposition: Home    Planned Readmission: No    Discharge Medications:   Please see AVS    Discharge instructions :   -Do not place anything (no partner, tampons or douche) in your vagina for 6 weeks  -You may walk for exercise for the first 6 weeks then gradually return to your usual activities    -Please do not drive for 1 week if you have no stitches and for 2 weeks if you have stitches or underwent a  delivery     -You may take baths or shower per your preference    -Please look at your bust (breasts) in the mirror daily and call your doctor for redness or tenderness or increased warmth  - If you have had a  section please look at your incision daily as well and call provider for increasing redness or steady drainage from the incision    -Please call your doctor's office if temperature > 100 4*F or 38* C, worsening pain or a foul discharge      Follow Up:  - Follow up in 3 weeks for postpartum visit    Sheyla Gregory MD

## 2023-03-19 NOTE — ANESTHESIA POSTPROCEDURE EVALUATION
Post-Op Assessment Note    CV Status:  Stable  Pain Score: 0    Pain management: adequate     Mental Status:  Alert and awake   Hydration Status:  Stable   PONV Controlled:  None   Airway Patency:  Patent         Post-op block assessment: no complications and catheter intact      No notable events documented      BP      Temp      Pulse     Resp      SpO2

## 2023-03-20 VITALS
WEIGHT: 141 LBS | OXYGEN SATURATION: 98 % | HEART RATE: 74 BPM | HEIGHT: 64 IN | SYSTOLIC BLOOD PRESSURE: 129 MMHG | DIASTOLIC BLOOD PRESSURE: 64 MMHG | BODY MASS INDEX: 24.07 KG/M2 | RESPIRATION RATE: 18 BRPM | TEMPERATURE: 98.6 F

## 2023-03-20 RX ORDER — IBUPROFEN 600 MG/1
600 TABLET ORAL EVERY 6 HOURS
Qty: 30 TABLET | Refills: 0
Start: 2023-03-20

## 2023-03-20 RX ORDER — ACETAMINOPHEN 325 MG/1
650 TABLET ORAL EVERY 4 HOURS PRN
Refills: 0
Start: 2023-03-20

## 2023-03-20 RX ORDER — DIAPER,BRIEF,INFANT-TODD,DISP
1 EACH MISCELLANEOUS DAILY PRN
Qty: 30 G | Refills: 0
Start: 2023-03-20

## 2023-03-20 RX ADMIN — IBUPROFEN 600 MG: 600 TABLET, FILM COATED ORAL at 14:12

## 2023-03-20 RX ADMIN — DOCUSATE SODIUM 100 MG: 100 CAPSULE, LIQUID FILLED ORAL at 08:30

## 2023-03-20 RX ADMIN — IBUPROFEN 600 MG: 600 TABLET, FILM COATED ORAL at 08:30

## 2023-03-20 NOTE — ASSESSMENT & PLAN NOTE
Recovering well   Encourage Ambulation  Encourage breastfeeding  GBS -   Rh +   Clinical concern for abruption at time of delivery due to appearance of placenta  Received TXA

## 2023-03-20 NOTE — PLAN OF CARE
Problem: POSTPARTUM  Goal: Experiences normal postpartum course  Description: INTERVENTIONS:  - Monitor maternal vital signs  - Assess uterine involution and lochia  Outcome: Progressing  Goal: Appropriate maternal -  bonding  Description: INTERVENTIONS:  - Identify family support  - Assess for appropriate maternal/infant bonding   -Encourage maternal/infant bonding opportunities  - Referral to  or  as needed  Outcome: Progressing  Goal: Establishment of infant feeding pattern  Description: INTERVENTIONS:  - Assess breast/bottle feeding  - Refer to lactation as needed  Outcome: Progressing  Goal: Incision(s), wounds(s) or drain site(s) healing without S/S of infection  Description: INTERVENTIONS  - Assess and document dressing, incision, wound bed, drain sites and surrounding tissue  - Provide patient and family education  - Perform skin care/dressing changes every   Outcome: Progressing     Problem: PAIN - ADULT  Goal: Verbalizes/displays adequate comfort level or baseline comfort level  Description: Interventions:  - Encourage patient to monitor pain and request assistance  - Assess pain using appropriate pain scale  - Administer analgesics based on type and severity of pain and evaluate response  - Implement non-pharmacological measures as appropriate and evaluate response  - Consider cultural and social influences on pain and pain management  - Notify physician/advanced practitioner if interventions unsuccessful or patient reports new pain  Outcome: Progressing     Problem: INFECTION - ADULT  Goal: Absence or prevention of progression during hospitalization  Description: INTERVENTIONS:  - Assess and monitor for signs and symptoms of infection  - Monitor lab/diagnostic results  - Monitor all insertion sites, i e  indwelling lines, tubes, and drains  - Monitor endotracheal if appropriate and nasal secretions for changes in amount and color  - [place] appropriate cooling/warming therapies per order  - Administer medications as ordered  - Instruct and encourage patient and family to use good hand hygiene technique  - Identify and instruct in appropriate isolation precautions for identified infection/condition  Outcome: Progressing  Goal: Absence of fever/infection during neutropenic period  Description: INTERVENTIONS:  - Monitor WBC    Outcome: Progressing     Problem: SAFETY ADULT  Goal: Patient will remain free of falls  Description: INTERVENTIONS:  - Educate patient/family on patient safety including physical limitations  - Instruct patient to call for assistance with activity   - Consult OT/PT to assist with strengthening/mobility   - Keep Call bell within reach  - Keep bed low and locked with side rails adjusted as appropriate  - Keep care items and personal belongings within reach  - Initiate and maintain comfort rounds  - Make Fall Risk Sign visible to staff  - Offer Toileting every Hours, in advance of need  - Initiate/Maintain alarm  - Obtain necessary fall risk management equipment:   - Apply yellow socks and bracelet for high fall risk patients  - Consider moving patient to room near nurses station  Outcome: Progressing  Goal: Maintain or return to baseline ADL function  Description: INTERVENTIONS:  -  Assess patient's ability to carry out ADLs; assess patient's baseline for ADL function and identify physical deficits which impact ability to perform ADLs (bathing, care of mouth/teeth, toileting, grooming, dressing, etc )  - Assess/evaluate cause of self-care deficits   - Assess range of motion  - Assess patient's mobility; develop plan if impaired  - Assess patient's need for assistive devices and provide as appropriate  - Encourage maximum independence but intervene and supervise when necessary  - Involve family in performance of ADLs  - Assess for home care needs following discharge   - Consider OT consult to assist with ADL evaluation and planning for discharge  - Provide patient education as appropriate  Outcome: Progressing     Problem: Knowledge Deficit  Goal: Patient/family/caregiver demonstrates understanding of disease process, treatment plan, medications, and discharge instructions  Description: Complete learning assessment and assess knowledge base    Interventions:  - Provide teaching at level of understanding  - Provide teaching via preferred learning methods  Outcome: Progressing     Problem: DISCHARGE PLANNING  Goal: Discharge to home or other facility with appropriate resources  Description: INTERVENTIONS:  - Identify barriers to discharge w/patient and caregiver  - Arrange for needed discharge resources and transportation as appropriate  - Identify discharge learning needs (meds, wound care, etc )  - Arrange for interpretive services to assist at discharge as needed  - Refer to Case Management Department for coordinating discharge planning if the patient needs post-hospital services based on physician/advanced practitioner order or complex needs related to functional status, cognitive ability, or social support system  Outcome: Progressing

## 2023-03-20 NOTE — PLAN OF CARE
Problem: POSTPARTUM  Goal: Experiences normal postpartum course  Description: INTERVENTIONS:  - Monitor maternal vital signs  - Assess uterine involution and lochia  Outcome: Adequate for Discharge  Goal: Appropriate maternal -  bonding  Description: INTERVENTIONS:  - Identify family support  - Assess for appropriate maternal/infant bonding   -Encourage maternal/infant bonding opportunities  - Referral to  or  as needed  Outcome: Adequate for Discharge  Goal: Establishment of infant feeding pattern  Description: INTERVENTIONS:  - Assess breast/bottle feeding  - Refer to lactation as needed  Outcome: Adequate for Discharge  Goal: Incision(s), wounds(s) or drain site(s) healing without S/S of infection  Description: INTERVENTIONS  - Assess and document dressing, incision, wound bed, drain sites and surrounding tissue  - Provide patient and family education  Outcome: Adequate for Discharge     Problem: PAIN - ADULT  Goal: Verbalizes/displays adequate comfort level or baseline comfort level  Description: Interventions:  - Encourage patient to monitor pain and request assistance  - Assess pain using appropriate pain scale  - Administer analgesics based on type and severity of pain and evaluate response  - Implement non-pharmacological measures as appropriate and evaluate response  - Consider cultural and social influences on pain and pain management  - Notify physician/advanced practitioner if interventions unsuccessful or patient reports new pain  Outcome: Adequate for Discharge     Problem: INFECTION - ADULT  Goal: Absence or prevention of progression during hospitalization  Description: INTERVENTIONS:  - Assess and monitor for signs and symptoms of infection  - Monitor lab/diagnostic results  - Monitor all insertion sites, i e  indwelling lines, tubes, and drains  - Monitor endotracheal if appropriate and nasal secretions for changes in amount and color  - [place] appropriate cooling/warming therapies per order  - Administer medications as ordered  - Instruct and encourage patient and family to use good hand hygiene technique  - Identify and instruct in appropriate isolation precautions for identified infection/condition  Outcome: Adequate for Discharge  Goal: Absence of fever/infection during neutropenic period  Description: INTERVENTIONS:  - Monitor WBC    Outcome: Adequate for Discharge     Problem: SAFETY ADULT  Goal: Patient will remain free of falls  Description: INTERVENTIONS:  - Educate patient/family on patient safety including physical limitations  - Instruct patient to call for assistance with activity   - Consult OT/PT to assist with strengthening/mobility   - Keep Call bell within reach  - Keep bed low and locked with side rails adjusted as appropriate  - Keep care items and personal belongings within reach  - Initiate and maintain comfort rounds  - Make Fall Risk Sign visible to staff  - Apply yellow socks and bracelet for high fall risk patients  - Consider moving patient to room near nurses station  Outcome: Adequate for Discharge  Goal: Maintain or return to baseline ADL function  Description: INTERVENTIONS:  -  Assess patient's ability to carry out ADLs; assess patient's baseline for ADL function and identify physical deficits which impact ability to perform ADLs (bathing, care of mouth/teeth, toileting, grooming, dressing, etc )  - Assess/evaluate cause of self-care deficits   - Assess range of motion  - Assess patient's mobility; develop plan if impaired  - Assess patient's need for assistive devices and provide as appropriate  - Encourage maximum independence but intervene and supervise when necessary  - Involve family in performance of ADLs  - Assess for home care needs following discharge   - Consider OT consult to assist with ADL evaluation and planning for discharge  - Provide patient education as appropriate  Outcome: Adequate for Discharge     Problem: Knowledge Deficit  Goal: Patient/family/caregiver demonstrates understanding of disease process, treatment plan, medications, and discharge instructions  Description: Complete learning assessment and assess knowledge base    Interventions:  - Provide teaching at level of understanding  - Provide teaching via preferred learning methods  Outcome: Adequate for Discharge     Problem: DISCHARGE PLANNING  Goal: Discharge to home or other facility with appropriate resources  Description: INTERVENTIONS:  - Identify barriers to discharge w/patient and caregiver  - Arrange for needed discharge resources and transportation as appropriate  - Identify discharge learning needs (meds, wound care, etc )  - Arrange for interpretive services to assist at discharge as needed  - Refer to Case Management Department for coordinating discharge planning if the patient needs post-hospital services based on physician/advanced practitioner order or complex needs related to functional status, cognitive ability, or social support system  Outcome: Adequate for Discharge

## 2023-03-20 NOTE — LACTATION NOTE
This note was copied from a baby's chart  CONSULT - LACTATION  Baby Boy (Kenia) Pj 0 days male MRN: [id number]    [facility] NURSERY Room / Bed: (N)/ 313(N) Encounter: [id number]    Maternal Information     MOTHER:  Gisele Snow  Maternal Age: 25 y o    OB History: # 1 - Date: 23, Sex: Male, Weight: 3460 g (7 lb 10 1 oz), GA: 40w2d, Delivery: Vaginal, Spontaneous, Apgar1: 8, Apgar5: 9, Living: Living, Birth Comments: None   Previouse breast reduction surgery? No    Lactation history:   Has patient previously breast fed: No   How long had patient previously breast fed:     Previous breast feeding complications:       Past Surgical History:   Procedure Laterality Date   • NO PAST SURGERIES          Birth information:  YOB: 2023   Time of birth: 2:17 AM   Sex: male   Delivery type: Vaginal, Spontaneous   Birth Weight: 3460 g (7 lb 10 1 oz)   Percent of Weight Change: 0%     Gestational Age: 41w4d   [unfilled]    Assessment     Breast and nipple assessment: inverted nipple     Assessment: normal assessment    Feeding assessment: very difficult latch, baby takes a few sucks and pops off  LATCH:  Latch: Repeated attempts, hold nipple in mouth, stimulate to suck   Audible Swallowing: A few with stimulation   Type of Nipple: Inverted (do albert slightly with latch assist )   Comfort (Breast/Nipple): Soft/non-tender   Hold (Positioning): Partial assist, teach one side, mother does other, staff holds   [facility]          Feeding recommendations:  Attempt baby at breast every 2-3 hours and hand express  Met with Damaris Mederos and provided her with the Ready Set Baby Booklet which contained information on:  Hand expression with access to QR codes to review hand expression  Positioning and latch reviewed as well as showing images of other feeding positions  Discussed the properties of a good latch in any position     Feeding on cue and what that means for recognizing infant's hunger, s/s that baby is getting enough milk and some s/s that breastfeeding dyad may need further help  Skin to Skin contact and benefits to mom and baby  Avoidance of pacifiers for the first month discussed  Gave information on common concerns, what to expect the first few weeks after delivery, preparing for other caregivers, and how partners can help  Resources for support also provided  Shira Hutchinson states that baby had a few good feeds but has been sleepy since his circumcision this afternoon  Shira Hutchinson has inverted nipples that albert slightly with the latch assist  Attempt  made to latch baby at the breast was unsuccessful  Baby takes a few sucks and pops off  Skin to skin encouraged  Did demonstrate hand expression and Shira Hutchinson was able to return the demonstration  Positioning and latch were reviewed:   - Position baby up at chest level using pillows for support    - Bring baby to breast,not breast to baby ( no hunching over )   - Align nose to nipple and drag nipple down to chin to achieve a wide open mouth and a deep latch   - Baby's ear, shoulder, hip in alignment   - Baby's upper and lower lip should be flanged on the breast     Encouraged mom to utilize nursing staff for breastfeeding support tonight and to call lactation for breastfeeding support in the morning as needed  Phone number provided  Did discuss supplementation options if needed  Shira Hutchinson is agreeable to use of Donor Breastmilk  If needed  If baby continues to not latch will start her pumping  The Discharge Breastfeeding Booklet was left at bedside for review          Mariana Mccloud RN 3/19/2023 9:03 PM

## 2023-03-20 NOTE — PLAN OF CARE
Problem: POSTPARTUM  Goal: Experiences normal postpartum course  Description: INTERVENTIONS:  - Monitor maternal vital signs  - Assess uterine involution and lochia  3/20/2023 1359 by Brinda Ware RN  Outcome: Completed  3/20/2023 0931 by Brinda Ware RN  Outcome: Adequate for Discharge  Goal: Appropriate maternal -  bonding  Description: INTERVENTIONS:  - Identify family support  - Assess for appropriate maternal/infant bonding   -Encourage maternal/infant bonding opportunities  - Referral to  or  as needed  3/20/2023 94 [phone] 11 by Brinda Wrae RN  Outcome: Completed  3/20/2023 0931 by Brinda Ware RN  Outcome: Adequate for Discharge  Goal: Establishment of infant feeding pattern  Description: INTERVENTIONS:  - Assess breast/bottle feeding  - Refer to lactation as needed  3/20/2023 135 by Brinda Ware RN  Outcome: Completed  3/20/2023 0931 by Brinda Ware RN  Outcome: Adequate for Discharge  Goal: Incision(s), wounds(s) or drain site(s) healing without S/S of infection  Description: INTERVENTIONS  - Assess and document dressing, incision, wound bed, drain sites and surrounding tissue  - Provide patient and family education  3/20/2023 1359 by Brinda Ware RN  Outcome: Completed  3/20/2023 0931 by Brinda Ware RN  Outcome: Adequate for Discharge     Problem: PAIN - ADULT  Goal: Verbalizes/displays adequate comfort level or baseline comfort level  Description: Interventions:  - Encourage patient to monitor pain and request assistance  - Assess pain using appropriate pain scale  - Administer analgesics based on type and severity of pain and evaluate response  - Implement non-pharmacological measures as appropriate and evaluate response  - Consider cultural and social influences on pain and pain management  - Notify physician/advanced practitioner if interventions unsuccessful or patient reports new pain  3/20/2023 1359 by Brinda Ware RN  Outcome: Completed  3/20/2023 0931 by Shelly Viveros RN  Outcome: Adequate for Discharge     Problem: INFECTION - ADULT  Goal: Absence or prevention of progression during hospitalization  Description: INTERVENTIONS:  - Assess and monitor for signs and symptoms of infection  - Monitor lab/diagnostic results  - Monitor all insertion sites, i e  indwelling lines, tubes, and drains  - Monitor endotracheal if appropriate and nasal secretions for changes in amount and color  - [place] appropriate cooling/warming therapies per order  - Administer medications as ordered  - Instruct and encourage patient and family to use good hand hygiene technique  - Identify and instruct in appropriate isolation precautions for identified infection/condition  3/20/2023 1359 by Shelly Viveros RN  Outcome: Completed  3/20/2023 0931 by Shelly Viveros RN  Outcome: Adequate for Discharge  Goal: Absence of fever/infection during neutropenic period  Description: INTERVENTIONS:  - Monitor WBC    3/20/2023 1359 by Shelly Viveros RN  Outcome: Completed  3/20/2023 0931 by Shelly Viveros RN  Outcome: Adequate for Discharge     Problem: SAFETY ADULT  Goal: Patient will remain free of falls  Description: INTERVENTIONS:  - Educate patient/family on patient safety including physical limitations  - Instruct patient to call for assistance with activity   - Consult OT/PT to assist with strengthening/mobility   - Keep Call bell within reach  - Keep bed low and locked with side rails adjusted as appropriate  - Keep care items and personal belongings within reach  - Initiate and maintain comfort rounds  - Make Fall Risk Sign visible to staff  - Apply yellow socks and bracelet for high fall risk patients  - Consider moving patient to room near nurses station  3/20/2023 1359 by Shelly Viveros RN  Outcome: Completed  3/20/2023 0931 by Shelly Viveros RN  Outcome: Adequate for Discharge  Goal: Maintain or return to baseline ADL function  Description: INTERVENTIONS:  -  Assess patient's ability to carry out ADLs; assess patient's baseline for ADL function and identify physical deficits which impact ability to perform ADLs (bathing, care of mouth/teeth, toileting, grooming, dressing, etc )  - Assess/evaluate cause of self-care deficits   - Assess range of motion  - Assess patient's mobility; develop plan if impaired  - Assess patient's need for assistive devices and provide as appropriate  - Encourage maximum independence but intervene and supervise when necessary  - Involve family in performance of ADLs  - Assess for home care needs following discharge   - Consider OT consult to assist with ADL evaluation and planning for discharge  - Provide patient education as appropriate  3/20/2023 1359 by Keysha Hardwick RN  Outcome: Completed  3/20/2023 0931 by Keysha Hardwick RN  Outcome: Adequate for Discharge     Problem: Knowledge Deficit  Goal: Patient/family/caregiver demonstrates understanding of disease process, treatment plan, medications, and discharge instructions  Description: Complete learning assessment and assess knowledge base    Interventions:  - Provide teaching at level of understanding  - Provide teaching via preferred learning methods  3/20/2023 1359 by Keysha Hardwick RN  Outcome: Completed  3/20/2023 0931 by Keysha Hardwick RN  Outcome: Adequate for Discharge     Problem: DISCHARGE PLANNING  Goal: Discharge to home or other facility with appropriate resources  Description: INTERVENTIONS:  - Identify barriers to discharge w/patient and caregiver  - Arrange for needed discharge resources and transportation as appropriate  - Identify discharge learning needs (meds, wound care, etc )  - Arrange for interpretive services to assist at discharge as needed  - Refer to Case Management Department for coordinating discharge planning if the patient needs post-hospital services based on physician/advanced practitioner order or complex needs related to functional status, cognitive ability, or social support system  3/20/2023 1359 by Aamir Mayfield, RN  Outcome: Completed  3/20/2023 0931 by Aamir Mayfield, RN  Outcome: Adequate for Discharge

## 2023-03-20 NOTE — PROGRESS NOTES
Progress Note - OB/GYN  Vera Gibson 25 y o  female MRN: [id number]  Unit/Bed#: -01 Encounter: [id number]    Assessment and Plan     Vera Gibson is a patient of: [facility]  She is PPD# 1 s/p  spontaneous vaginal delivery  Recovering well and is stable       *  (spontaneous vaginal delivery)  Assessment & Plan  Recovering well   Encourage Ambulation  Encourage breastfeeding  GBS -   Rh +   Clinical concern for abruption at time of delivery due to appearance of placenta  Received TXA  Disposition    - Anticipate discharge home on PPD# 1 vs 2      Subjective/Objective     Chief Complaint: Postpartum State     Subjective:    Vera Gibson is PPD#1 s/p  spontaneous vaginal delivery  She has no current complaints  Pain is well controlled  Patient is currently voiding  She is ambulating  Patient is currently passing flatus and has had no bowel movement  She is tolerating PO, and denies nausea or vomitting  Patient denies fever, chills, chest pain, shortness of breath, or calf tenderness  Lochia is normal  She is  Breastfeeding  She is recovering well and is stable  She desires discharge today if she and baby are both cleared           Vitals:   /73 (BP Location: Right arm)   Pulse 65   Temp 97 6 °F (36 4 °C) (Oral)   Resp 18   Ht 5' 4" (1 626 m)   Wt 64 kg (141 lb)   LMP 06/10/2022   SpO2 98%   Breastfeeding Yes   BMI 24 20 kg/m²       Intake/Output Summary (Last 24 hours) at 3/20/2023 5316  Last data filed at 3/19/2023 0855  Gross per 24 hour   Intake --   Output 450 ml   Net -450 ml       Invasive Devices     None                 Physical Exam:   GEN: Vera Gibson appears well, alert and oriented x 3, pleasant and cooperative   CARDIO: RRR, no murmurs or rubs  RESP:  CTAB, no wheezes or rales  ABDOMEN: soft, no tenderness, no distention, fundus @ U-2  EXTREMITIES: non tender, no erythema  Labs:     Hemoglobin   Date Value Ref Range Status   2023 11 4 (L) 11 5 - 15 4 g/dL Final   12/27/2022 10 7 (L) 11 5 - 15 4 g/dL Final   02/14/2017 13 0 11 5 - 15 3 g/dL Final     WBC   Date Value Ref Range Status   03/18/2023 14 22 (H) 4 31 - 10 16 Thousand/uL Final   12/27/2022 8 10 4 31 - 10 16 Thousand/uL Final   02/14/2017 5 5 4 5 - 13 0 Thousand/uL Final     Platelets   Date Value Ref Range Status   03/18/2023 217 149 - 390 Thousands/uL Final   12/27/2022 202 149 - 390 Thousands/uL Final   02/14/2017 233 140 - 400 Thousand/uL Final     Creatinine   Date Value Ref Range Status   07/28/2022 0 57 (L) 0 60 - 1 30 mg/dL Final     Comment:     Standardized to IDMS reference method   04/16/2019 0 74 0 60 - 1 30 mg/dL Final     Comment:     Standardized to IDMS reference method   02/14/2017 0 66 0 50 - 1 00 mg/dL Final     AST   Date Value Ref Range Status   07/28/2022 19 5 - 45 U/L Final     Comment:     Specimen collection should occur prior to Sulfasalazine administration due to the potential for falsely depressed results  04/16/2019 28 5 - 45 U/L Final     Comment:       Specimen collection should occur prior to Sulfasalazine administration due to the potential for falsely depressed results  02/14/2017 21 12 - 32 U/L Final     ALT   Date Value Ref Range Status   07/28/2022 21 12 - 78 U/L Final     Comment:     Specimen collection should occur prior to Sulfasalazine and/or Sulfapyridine administration due to the potential for falsely depressed results  04/16/2019 28 12 - 78 U/L Final     Comment:       Specimen collection should occur prior to Sulfasalazine and/or Sulfapyridine administration due to the potential for falsely depressed results      02/14/2017 12 5 - 32 U/L Final     Comment:       Performed at: Binh Scott MD, [facility] [address]             Herrera Sam MD  3/20/2023  6:58 AM

## 2023-03-24 ENCOUNTER — OFFICE VISIT (OUTPATIENT)
Dept: POSTPARTUM | Facility: CLINIC | Age: 23
End: 2023-03-24

## 2023-03-24 VITALS — SYSTOLIC BLOOD PRESSURE: 114 MMHG | DIASTOLIC BLOOD PRESSURE: 76 MMHG

## 2023-03-24 NOTE — PROGRESS NOTES
INITIAL BREAST FEEDING EVALUATION    Informant/Relationship: Kenia    Discussion of General Lactation Issues: Ines Sterling is having trouble latching  He will latch but quickly pops off the breast   Keli Claire is pumping and Ines Sterling is essentially bottle fed  He is being supplemented with formula when expressed milk is not available    Infant is 11days old today   History:  Fertility Problem:no  Breast changes:yes - breasts were king, areolas were larger  : yes - labor augmented wiht pitocin  Full term:yes - 40 2/7 weeks   labor:no  First nursing/attempt < 1 hour after birth:yes - baby latched in the delivery room but only for a few sucks  Skin to skin following delivery:yes - in the delivery room  Breast changes after delivery:yes - breasts are king and is transitioning to mature milk  Rooming in (infant in room with mother with exception of procedures, eg  Circumcision: yes  Blood sugar issues:no  NICU stay:no  Jaundice:no  Phototherapy:no  Supplement given: (list supplement and method used as well as reason(s):not until after discharge    Past Medical History:   Diagnosis Date   • Anxiety     Dc'd Zoloft 1 1/2 years ago, states handles anxiety better   • Depression    • Lyme disease     Last assessed 3/17/2017   • Psychiatric disorder     Depression/Anxiety         Current Outpatient Medications:   •  acetaminophen (TYLENOL) 325 mg tablet, Take 2 tablets (650 mg total) by mouth every 4 (four) hours as needed for mild pain, Disp: , Rfl: 0  •  benzocaine-menthol-lanolin-aloe (DERMOPLAST) 20-0 5 % topical spray, Apply 1 application  topically every 6 (six) hours as needed for mild pain or irritation, Disp: , Rfl: 0  •  hydrocortisone 1 % cream, Apply 1 application   topically daily as needed for irritation or rash, Disp: 30 g, Rfl: 0  •  ibuprofen (MOTRIN) 600 mg tablet, Take 1 tablet (600 mg total) by mouth every 6 (six) hours, Disp: 30 tablet, Rfl: 0  •  Prenatal Vit-Iron Carbonyl-FA (prenatal multivitamin) TABS, Take 1 tablet by mouth daily, Disp: , Rfl:   •  witch hazel-glycerin (TUCKS) topical pad, Apply 1 pad  topically every 4 (four) hours as needed for irritation, Disp: , Rfl: 0    No Known Allergies    Social History     Substance and Sexual Activity   Drug Use Not Currently   • Types: Marijuana    Comment: occasionally--stopped since pregnancy       Social History Former smoker and THC use    Interval Breastfeeding History:    Frequency of breast feeding: Attempting every 3 hours or so  Does mother feel breastfeeding is effective: No  Does infant appear satisfied after nursing:No  Stooling pattern normal: Yes  Urinating frequently:Yes  Using shield or shells: Yes, was given one in the hospital but it did not help him latch     Alternative/Artificial Feedings:   Bottle: Yes, Kori Delvalle is exclusively bottle fed at this time  Paced bottle feeding technique is not being used  Cup: No  Syringe/Finger: No           Formula Type: Enfamil Gentlease                     Amount: about 15ml mixed with 1 5 ounces of breast milk at every feeding            Breast Milk:                      Amount: 1 5 ounces mixed with 15ml of formula at every feeding            Frequency Q 3 Hr between feedings  Elimination Problems: No      Equipment:  Nipple Shield             Type: none             Size: n/a             Frequency of Use: n/a  Pump            Type: Medela PIS            Frequency of Use: Every 3 hours  Expresses about 2 ounces every pumping session  Shells            Type: none            Frequency of use: n/a    Equipment Problems: yes Burke Lyles feels her flange is too large    Mom:  Breast: Small symmetrical breasts  Rounded shape  Closely spaced  Firm and full  Nipple Assessment in General: Inverted nipples  Very small areolas  Mother's Awareness of Feeding Cues                 Recognizes:  Yes                  Verbalizes: Yes  Support System: FOB, extended family  History of Breastfeeding: none  Changes/Stressors/Violence: Bharat Lion is concerned that Wilda is not nursing and about her milk production  Concerns/Goals: Kenia desires to directly breastfeed  She is open to exclusive pumping if Hugo Mays does not nurse  She wants to provide breast milk for a year  Problems with Mom: none    Physical Exam  Constitutional:       Appearance: Normal appearance  HENT:      Head: Normocephalic and atraumatic  Cardiovascular:      Rate and Rhythm: Normal rate and regular rhythm  Pulses: Normal pulses  Heart sounds: Normal heart sounds  Pulmonary:      Effort: Pulmonary effort is normal       Breath sounds: Normal breath sounds  Musculoskeletal:         General: Normal range of motion  Cervical back: Normal range of motion and neck supple  Neurological:      Mental Status: She is alert and oriented to person, place, and time  Skin:     General: Skin is warm and dry  Psychiatric:         Mood and Affect: Mood normal          Behavior: Behavior normal          Thought Content: Thought content normal          Judgment: Judgment normal          Infant:  Behaviors: Alert  Color: Pink  Birth weight: 3460gram  Current weight: 3220gram    Problems with infant: not latching      General Appearance:  Alert, active, no distress                             Head:  Normocephalic, AFOF, sutures opposed                             Eyes:  Conjunctiva clear, no drainage                              Ears:  Normally placed, no anomolies                             Nose:  no drainage or erythema                           Mouth:  No lesions  Sucking callus on the upper lip  Tongue did not extend beyond the lower alveolar ridge but lateralizes well  Only the tip and the edges curl up when crying  The body of the tongue remains down in the floor of the mouth  Initially when sucking frequent retraction of the tongue noted but after a few minutes, effective cupping and peristalsis noted    There is a speed bump noted when sweeping my finger under the tongue  Neck:  Supple, symmetrical, trachea midline                 Respiratory:  No grunting, flaring, retractions, breath sounds clear and equal            Cardiovascular:  Regular rate and rhythm  No murmur  Adequate perfusion/capillary refill  Femoral pulse present                    Abdomen:   Soft, non-tender, no masses, bowel sounds present, no HSM             Genitourinary:  Normal male, testes descended, no discharge, swelling, or pain, anus patent                          Spine:   No abnormalities noted        Musculoskeletal:  Full range of motion          Skin/Hair/Nails:   Skin warm, dry, and intact, no rashes or abnormal dyspigmentation or lesions                Neurologic:   No abnormal movement, tone appropriate for gestational age     Latch: None  Efficiency:               Lips Flanged: n/a              Depth of latch: none              Audible Swallow: No              Visible Milk: Yes, Deepas breasts were leaking copiously              Wide Open/ Asymmetrical: n/a              Suck Swallow Cycle: Breathing: congested, Coordinated: n/a  Nipple Assessment after latch: n/a  Latch Problems: Initially Chadd Lerma was sleeping and content to nap near the breast   Over the course of the visit, he began to exhibit early feeding cues and did seek the nipple and open his mouth  He took frequent naps  We gently stimulated him to wake him up but then he became quickly frustrated when he attempted to latch and did not achieve a deep latch  We attempted using a shield because at this point Keyanaroshan Parrishde was showing late feeding cues but Yusef Michael did not bring expressed milk with her  Chadd Lerma open his mouth very wide and was able to latch deeply on the shield but would not suck  The attempt was stopped and the visit ended so Yusef Rodriguez could take Garrick Parrishde home to feed him and pump      Position:  Infant's Ergonomics/Body               Body Alignment: Yes, after education               Head Supported: Yes               Close to Mom's body/ Lifted/ Supported: Yes               Mom's Ergonomics/Body: Yes                           Supported: Yes                           Sitting Back: Yes                           Brings Baby to her breast: Yes  Positioning Problems: Initially, Kenia positioned Luci in cross cradle hold at the left breast   His body was aligned well but there was a visible space between their bodies  Luci was pushing his hands between his body and the breast, arching and crying  When Kenia compressed her breast, her nipple inverted even more  When he was positioned skin to skin in a slightly laid back position, he was quickly calm and explored the breast on his own      Handouts:   Paced bottle feeding, Hands on pumping, Hand expression and Latch Check List    Education:  Reviewed Latch: Demonstrated how to align the baby so that his nose is just above the nipple with his lower lip and chin touching the breast to encourage the deepest, widest, off-center latch  Reviewed Positioning for Dyad: Demonstrated how to position mom comfortably and supported with her baby "belly to belly" with his ear, shoulder and hip in alignment prior to bringing him to the breast  Reviewed Frequency/Supply & Demand: Discussed how milk production is established and maintained  Reviewed Infant:Cues and varied States of Awareness  Reviewed Infant Elimination: Discussed expected urine and stool output in an infant Dorian's age  Reviewed Alternative/Artificial Feedings:  Reviewed Mom/Breast care: Discussed appropriate handling of the breasts to avoid inflammation and trauma  Demonstrated reverse pressure softening to albert nipples  Reviewed Equipment: Discussed the use and features of the Medela PIS and effective and gentle hands on technique  Discussed how to determine which size flange to use        Plan:   Reassurance and support given  I encouraged Kenia to spend lots of time skin to skin with Janis Tapia to allow him to explore the breast on his own  She was shown how to position both of them comfortably for an effective latch  She was given information about paced bottle feeding, effective hand expression and pumping  I encouraged her to continue to pump every feeding until effective breastfeeding is established  A follow up appointment was scheduled to check progress  I encouraged her to call with any questions or concerns  I have spent 90 minutes with Patient and family today in which greater than 50% of this time was spent in counseling/coordination of care regarding Patient and family education

## 2023-03-24 NOTE — PATIENT INSTRUCTIONS
Continue to feed Dorian at least every 3 hours until he has exceeded his birth weight  Feed expressed milk or formula as available  Paced bottle feeding technique is less stressful for your baby, prevents overfeeding and protects the breastfeeding relationship  You can find a video about paced bottle feeding at www lacted  org  Offer the breast as often as you feel comfortable  Attaching Your Baby at the [address] is a great resource for practicing effective positioning an determining if your baby is latching and feeding effectively  Continue pumping as often as Memonie Sizer is feeding until effectively at the breast   When pumping, cycle your pump through stimulation and expression mode several times in a session to stimulate several let downs until you have expressed enough milk to feed the baby and to achieve breast comfort  There is no need to "empty" the breast completely  Use gentle hands on pumping and hand expression   Maintain your pump as recommended  Use flange that fits comfortably and allows the breast to empty effectively  Spend as much time as you can skin to skin with Rannie Sizer and allow him to explore the breast on his own  Follow up as scheduled  Please call with any questions or concerns

## 2023-03-26 LAB — PLACENTA IN STORAGE: NORMAL

## 2023-03-27 NOTE — PROGRESS NOTES
I have reviewed the notes, assessments, and/or procedures performed by Raghavendra Cruz RN, IBCLC, I concur with her/his documentation of Annika Frost MD 03/26/23

## 2023-03-31 ENCOUNTER — OFFICE VISIT (OUTPATIENT)
Dept: POSTPARTUM | Facility: CLINIC | Age: 23
End: 2023-03-31

## 2023-03-31 NOTE — PROGRESS NOTES
BREAST FEEDING FOLLOW UP VISIT    Informant/Relationship: Kenia    Discussion of General Lactation Issues: Yamileth Hou continues to struggle to latch  Trying became very stressful  Emmett Morrow has been exclusively pumping and supplementing with formula and feels comfortable with her decision    Infant is 15days old today  Interval Breastfeeding History:    Frequency of breast feeding: None  Exclusively bottle feed  Does mother feel breastfeeding is effective: No  Does infant appear satisfied after nursing:No  Stooling pattern normal:Yes  Urinating frequently:Yes  Using shield or shells:No    Alternative/Artificial Feedings:   Bottle: Yes, Dr Branden Conroy  Cup: No  Syringe/Finger: No           Formula Type: Enfamil Gentle Ease                     Amount: 1-2 oz after feeding expressed milk            Breast Milk:                      Amount: 2oz            Frequency Q 3 Hr between feedings  Elimination Problems: No      Equipment:  Nipple Shield             Type: None             Size: n/a             Frequency of Use: n/a  Pump            Type: Medela Pump n' style            Frequency of Use: every 3 hrs ATC  Expresses 2 5-3 ounces per pumping session  Shells            Type: None            Frequency of use: n/a    Equipment Problems: no      Mom:  Breast: Small symmetrical breasts  Rounded shape  Closely spaced  Firm and full  Nipple Assessment in General: Inverted nipples bilaterally  Very small areolas  Mother's Awareness of Feeding Cues                 Recognizes: Yes                  Verbalizes: Yes  Support System: Boyfriend  History of Breastfeeding: None  Changes/Stressors/Violence:   Concerns/Goals: Emmett Morrow wants to make sure she is giving Rama Public enough milk and wants to check his weight  Emmett Morrow would like to increase her supply and provide Yamileth Hou with breast milk for at least year      Problems with Mom: concerns with milk production    OBGyn Exam    Infant:  Behaviors: Alert  Color: Pink  Birth weight: 3460 grams  Current weight: 3335 grams    Problems with infant: not latching, slow weight gain      General Appearance:  Alert, active, no distress                             Head:  Very noticeable skull asymmetry, AFOF, sutures opposed                             Eyes:  Conjunctiva clear, no drainage                              Ears:  Normally placed, no anomolies                             Nose:  no drainage or erythema                           Mouth:  No lesions  Tongue only extended to the lower lip  The tongue tips on it's side when lateralizing  Only the very edges of the tongue curl up when crying  Only intermittent effective cupping and peristalsis noted  Frequently, the tongue lost contact with my finger and suction was lost  There is a speed bump noted when sweeping my finger under the tongue                    Neck:  Positional preference to turn his chin to his right shoulder with his left ear at his left shoulder, symmetrical, trachea midline                 Respiratory:  No grunting, flaring, retractions, breath sounds clear and equal            Cardiovascular:  Regular rate and rhythm  No murmur  Adequate perfusion/capillary refill  Femoral pulse present                    Abdomen:   Soft, non-tender, no masses, bowel sounds present, no HSM             Genitourinary:  Normal male, testes descended, no discharge, swelling, or pain, anus patent                          Spine:   No abnormalities noted        Musculoskeletal:  Full range of motion          Skin/Hair/Nails:   Skin warm, dry, and intact, no rashes or abnormal dyspigmentation or lesions                Neurologic:   No abnormal movement, tone appropriate for gestational age     Latch: One the bottle  Efficiency:               Lips Flanged: Yes              Depth of latch: Good after reajustment              Audible Swallow: Yes              Wide Open:  Yes              Suck Swallow Cycle: Breathing: unlabored, Coordinated: Yes  Latch Problems: Shallow before adjustment  Some clicking noted but otherwise a comfortable, effective feeding    Position: Bottle Feed Sitting up slightly reclined  Infant's Ergonomics/Body               Body Alignment: Yes               Head Supported: Yes               Close to Mom's body/ Lifted/ Supported: Lifted and supported but not skin to skin               Mom's Ergonomics/Body: Yes                           Supported: Yes                           Sitting Back: No but not uncomfortable  Positioning Problems: None    Handouts: Increasing your supply    Education:  Reviewed Latch: Discussed potential reasons why Jesus Morales does not latch effectively at the breast  Reviewed Frequency/Supply & Demand: Discussed methods for increasing milk production        Plan:   Reassurance and support given  Jodee Castano feels more comfortable with exclusive pumping and bottle feeding so she can quantify Dorian's feedings  Jesus Morales is able to feed comfortably and effectively with paced bottle feeding technique  Jodee Omaira is happy to continue pumping but desires to increase milk production  We discussed more frequent day time pumping but I expressed concerns that this may not be sustainable and encouraged Kenia to do only what she is comfortably with  She was also given information about galactagogues that may help  Jesus Morales will follow up with his Peds for a weight check early next week  I encouraged Jodee Omaira to call with any questions or concerns  I have spent  60 minutes with Patient and family today in which greater than 50% of this time was spent in counseling/coordination of care regarding Patient and family education

## 2023-03-31 NOTE — PATIENT INSTRUCTIONS
Continue with your current feeding and pumping routine  If you desire to increase milk production, pumping a little more frequently during the day may help but avoid pumping so frequently that you are exhausted and stressed  Try the herbal supplements we discussed at your visit  Follow up with Dorian's peds for a weight check as scheduled  Please call with any questions or concerns

## 2023-04-01 NOTE — PROGRESS NOTES
I have reviewed the notes, assessments, and/or procedures performed by Lorena Lara RN, IBCLC, I concur with her/his documentation of Fletcher Vazquez MD 04/01/23

## 2024-04-10 ENCOUNTER — OFFICE VISIT (OUTPATIENT)
Age: 24
End: 2024-04-10
Payer: COMMERCIAL

## 2024-04-10 VITALS
WEIGHT: 105.6 LBS | OXYGEN SATURATION: 96 % | DIASTOLIC BLOOD PRESSURE: 66 MMHG | HEART RATE: 68 BPM | SYSTOLIC BLOOD PRESSURE: 104 MMHG | HEIGHT: 62 IN | BODY MASS INDEX: 19.43 KG/M2 | TEMPERATURE: 96.7 F | RESPIRATION RATE: 17 BRPM

## 2024-04-10 DIAGNOSIS — F32.A ANXIETY AND DEPRESSION: ICD-10-CM

## 2024-04-10 DIAGNOSIS — R53.83 OTHER FATIGUE: ICD-10-CM

## 2024-04-10 DIAGNOSIS — F41.9 ANXIETY AND DEPRESSION: ICD-10-CM

## 2024-04-10 DIAGNOSIS — G89.29 CHRONIC RIGHT-SIDED LOW BACK PAIN WITH RIGHT-SIDED SCIATICA: ICD-10-CM

## 2024-04-10 DIAGNOSIS — M54.41 CHRONIC RIGHT-SIDED LOW BACK PAIN WITH RIGHT-SIDED SCIATICA: ICD-10-CM

## 2024-04-10 DIAGNOSIS — Z00.00 ANNUAL PHYSICAL EXAM: Primary | ICD-10-CM

## 2024-04-10 PROCEDURE — 99395 PREV VISIT EST AGE 18-39: CPT

## 2024-04-10 PROCEDURE — 99214 OFFICE O/P EST MOD 30 MIN: CPT

## 2024-04-10 RX ORDER — ESCITALOPRAM OXALATE 10 MG/1
10 TABLET ORAL DAILY
Qty: 90 TABLET | Refills: 0 | Status: SHIPPED | OUTPATIENT
Start: 2024-04-10 | End: 2024-07-09

## 2024-04-10 NOTE — ASSESSMENT & PLAN NOTE
Reviewed options, pt would like to start an SSRI.  Spoke with her GYN regarding options as pt wishes to become pregnant in the near future.  There are inconsistent studies regarding the risks of SSRIs during pregnancy, which include  labor and postpartum hemorrhage.  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  Risk and benefits reviewed with pt and she elects to start Lexapro.  Will start at 10 mg daily and return in six weeks for follow up.

## 2024-04-10 NOTE — PROGRESS NOTES
ADULT ANNUAL PHYSICAL  [facility] [place]    NAME: Kenia Oliva  AGE: 23 y.o. SEX: female  : 2000     DATE: 4/10/2024     Assessment and Plan:     Problem List Items Addressed This Visit          Behavioral Health    Anxiety and depression     Reviewed options, pt would like to start an SSRI.  Spoke with her GYN regarding options as pt wishes to become pregnant in the near future.  There are inconsistent studies regarding the risks of SSRIs during pregnancy, which include  labor and postpartum hemorrhage.  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  Risk and benefits reviewed with pt and she elects to start Lexapro.  Will start at 10 mg daily and return in six weeks for follow up.         Relevant Medications    escitalopram (LEXAPRO) 10 mg tablet     Other Visit Diagnoses       Annual physical exam    -  Primary    Chronic right-sided low back pain with right-sided sciatica        Start with PT, ice/heat, 600 mg of ibuprofen every 6 hours PRN. If not improving after PT, will consider imaging and referral to ortho.    Relevant Orders    Ambulatory Referral to Comprehensive Spine Program    Other fatigue        Stress/depression related vs. other?  Basic lab work up to r/o DARRYN, vitamin deficiency, thyroid or metabolic cause. Pt does have 1 year old at home and works.    Relevant Orders    CBC and Platelet    Comprehensive metabolic panel    TSH, 3rd generation with Free T4 reflex    Vitamin D 25 hydroxy    Ferritin            Immunizations and preventive care screenings were discussed with patient today. Appropriate education was printed on patient's after visit summary.    Counseling:  Alcohol/drug use: discussed moderation in alcohol intake, the recommendations for healthy alcohol use, and avoidance of illicit drug use.  Dental Health: discussed importance of regular tooth brushing, flossing, and dental  visits.  Injury prevention: discussed safety/seat belts, safety helmets, smoke detectors, carbon dioxide detectors, and smoking near bedding or upholstery.  Sexual health: discussed sexually transmitted diseases, partner selection, use of condoms, avoidance of unintended pregnancy, and contraceptive alternatives.  Exercise: the importance of regular exercise/physical activity was discussed. Recommend exercise 3-5 times per week for at least 30 minutes.       Depression Screening and Follow-up Plan: Patient's depression screening was positive with a PHQ-9 score of 5. Patient assessed for underlying major depression. Brief counseling provided and recommend additional follow-up/re-evaluation next office visit. Would like to start an SSRI.    Tobacco Cessation Counseling: The patient is sincerely urged to quit consumption of tobacco. She is ready to quit tobacco.         Return in about 6 weeks (around 5/22/2024) for Recheck, started Lexapro.     Chief Complaint:     Chief Complaint   Patient presents with    Establish Care      History of Present Illness:     Adult Annual Physical   Patient here for a comprehensive physical exam. The patient reports problems - see below .    Pt reports a couple of months of sleeping more than usual.  Still feels tired in the morning.  Sleeps fine, but doesn't feel refresh.  Denies morning headaches, snoring, or choking/gasping during her sleep.  Pt reports vivid dreams as well.  No new medications, recent illnesses, and no recreational drug use.    Pt was on zoloft in the past from 8th grade to high school.  She would like to try something different.  She is planning to have another child in the near future.  Sent message to GYN asking for advice, will let pt know what they recommend.    Pt also has pain in her low back that started during her pregnancy.  She has pain that starts in the low back and radiates down the right leg along the buttock and side of thigh.  Occasionally cannot  walk due to the pain and has to crawl on all fours.  Has tried Advil 400 mg and ice, which helps but not entirely.  Activity at work aggravates the pain, rest makes it better.    FH:  Diabetes in grandmothers.  CVD:  Father has HTN.  Cancer:  none.    UTD on vaccines.    Diet and Physical Activity  Diet/Nutrition: well balanced diet.   Exercise: no formal exercise and has an active job and is lifting and walking a lot at work .      Depression Screening  PHQ-2/9 Depression Screening    Little interest or pleasure in doing things: 1 - several days  Feeling down, depressed, or hopeless: 1 - several days  Trouble falling or staying asleep, or sleeping too much: 2 - more than half the days  Feeling tired or having little energy: 1 - several days  Poor appetite or overeatin - not at all  Feeling bad about yourself - or that you are a failure or have let yourself or your family down: 0 - not at all  Trouble concentrating on things, such as reading the newspaper or watching television: 0 - not at all  Moving or speaking so slowly that other people could have noticed. Or the opposite - being so fidgety or restless that you have been moving around a lot more than usual: 0 - not at all  Thoughts that you would be better off dead, or of hurting yourself in some way: 0 - not at all  PHQ-9 Score: 5  PHQ-9 Interpretation: Mild depression       General Health  Sleep: sleeps well, gets 7-8 hours of sleep on average, and unrefreshing .   Hearing: normal - bilateral.  Vision: no vision problems.   Dental: regular dental visits.       /GYN Health  Follows with gynecology? yes   Last menstrual period: 2024  Contraceptive method:  none .  History of STDs?: no.     Advanced Care Planning  Do you have an advanced directive? no  Do you have a durable medical power of ? no  ACP document given to the patient? no      Review of Systems:     Review of Systems   Constitutional: Negative.    HENT: Negative.     Respiratory:   Negative for cough and shortness of breath.    Cardiovascular:  Negative for chest pain.   Gastrointestinal: Negative.    Genitourinary: Negative.    Musculoskeletal:  Positive for back pain. Negative for gait problem.   Skin:  Negative for rash.   Neurological:  Negative for syncope, light-headedness and headaches.   Psychiatric/Behavioral:  Positive for sleep disturbance (vivid dreams). Negative for dysphoric mood and hallucinations. The patient is nervous/anxious. The patient is not hyperactive.    All other systems reviewed and are negative.     Past Medical History:     Past Medical History:   Diagnosis Date    Anxiety     Dc'd Zoloft 1 1/2 years ago, states handles anxiety better    Depression     Lyme disease     Last assessed 3/17/2017    Psychiatric disorder     Depression/Anxiety      Past Surgical History:     Past Surgical History:   Procedure Laterality Date    NO PAST SURGERIES        Social History:     Social History     Socioeconomic History    Marital status: Single     Spouse name: None    Number of children: None    Years of education: None    Highest education level: None   Occupational History    None   Tobacco Use    Smoking status: Former     Current packs/day: 0.00     Average packs/day: 0.5 packs/day for 5.0 years (2.5 ttl pk-yrs)     Types: Cigarettes     Start date: 2017     Quit date: 2022     Years since quittin.7     Passive exposure: Past    Smokeless tobacco: Never    Tobacco comments:     Discontinued without diffiulty   Vaping Use    Vaping status: Former    Substances: THC   Substance and Sexual Activity    Alcohol use: Yes     Alcohol/week: 3.0 standard drinks of alcohol     Types: 3 Glasses of wine per week     Comment: socially--not currently due to pregnancy    Drug use: Not Currently     Types: Marijuana     Comment: occasionally--stopped since pregnancy    Sexual activity: Yes     Partners: Male   Other Topics Concern    None   Social History Narrative    Lives  with mom and dad and older sister    Pets - 2 dogs    No passive tobacco smoke exposure    Has smoke and CO detectors    Guns in home locked in safe    Wears seat belt in car    Activities; Soccer    Dental care regularly; Good dental hygiene    High school student; Public school    Sleeps 6-7 hours a day    Vegan diet         Social Determinants of Health     Financial Resource Strain: Not on file   Food Insecurity: Not on file   Transportation Needs: Not on file   Physical Activity: Unknown (4/16/2019)    Exercise Vital Sign     Days of Exercise per Week: 4 days     Minutes of Exercise per Session: Not asked   Stress: Stress Concern Present (3/13/2019)    [place] [employer] - Occupational Stress Questionnaire     Feeling of Stress : Rather much   Social Connections: Not on file   Intimate Partner Violence: Not on file   Housing Stability: Not on file      Family History:     Family History   Problem Relation Age of Onset    Anemia Mother     Vitamin D deficiency Mother     Depression Father     Anxiety disorder Father     Asthma Sister     Depression Sister     Anxiety disorder Sister     Diabetes Maternal Grandmother     Depression Maternal Grandmother     Glaucoma Maternal Grandmother     Anxiety disorder Maternal Grandmother     No Known Problems Maternal Grandfather     Diabetes Paternal Grandmother     Anxiety disorder Paternal Grandmother     Heart attack Paternal Grandfather     Alcohol abuse Neg Hx     Substance Abuse Neg Hx     Mental illness Neg Hx       Current Medications:     Current Outpatient Medications   Medication Sig Dispense Refill    escitalopram (LEXAPRO) 10 mg tablet Take 1 tablet (10 mg total) by mouth daily 90 tablet 0     No current facility-administered medications for this visit.      Allergies:     No Known Allergies   Physical Exam:     /66 (BP Location: Left arm, Patient Position: Sitting, Cuff Size: Standard)   Pulse 68   Temp (!) 96.7 °F (35.9 °C)  "(Tympanic)   Resp 17   Ht 5' 2.4\" (1.585 m)   Wt 47.9 kg (105 lb 9.6 oz)   LMP 03/18/2024 (Exact Date)   SpO2 96%   Breastfeeding No   BMI 19.07 kg/m²     Physical Exam  Vitals and nursing note reviewed.   Constitutional:       General: She is not in acute distress.     Appearance: Normal appearance. She is not toxic-appearing.   HENT:      Head: Normocephalic and atraumatic.      Jaw: There is normal jaw occlusion.      Right Ear: Hearing, tympanic membrane, ear canal and external ear normal.      Left Ear: Hearing, tympanic membrane, ear canal and external ear normal.      Nose: Nose normal.      Mouth/Throat:      Lips: Pink.      Mouth: Mucous membranes are moist. No oral lesions.      Tongue: No lesions.      Palate: No mass.      Pharynx: Oropharynx is clear. Uvula midline.   Eyes:      General: Lids are normal. Vision grossly intact.      Conjunctiva/sclera: Conjunctivae normal.      Pupils: Pupils are equal, round, and reactive to light.   Neck:      Thyroid: No thyroid mass, thyromegaly or thyroid tenderness.   Cardiovascular:      Rate and Rhythm: Normal rate and regular rhythm.      Pulses:           Radial pulses are 2+ on the right side and 2+ on the left side.        Dorsalis pedis pulses are 2+ on the right side and 2+ on the left side.      Heart sounds: Normal heart sounds. No murmur heard.  Pulmonary:      Effort: Pulmonary effort is normal. No respiratory distress.      Breath sounds: Normal breath sounds.   Abdominal:      General: Abdomen is flat. Bowel sounds are normal.      Palpations: Abdomen is soft.      Tenderness: There is no abdominal tenderness. There is no right CVA tenderness or left CVA tenderness.   Musculoskeletal:         General: No tenderness, deformity or signs of injury.      Cervical back: Normal and full passive range of motion without pain.      Thoracic back: Normal.      Lumbar back: Normal. No spasms, tenderness or bony tenderness. Normal range of motion. " Negative right straight leg raise test and negative left straight leg raise test.      Right hip: Normal.      Left hip: Normal.      Right upper leg: Normal.      Left upper leg: Normal.      Right knee: Normal.      Left knee: Normal.      Right lower leg: No edema.      Left lower leg: No edema.   Lymphadenopathy:      Cervical: No cervical adenopathy.   Skin:     General: Skin is warm and dry.      Capillary Refill: Capillary refill takes less than 2 seconds.      Coloration: Skin is not jaundiced.   Neurological:      General: No focal deficit present.      Mental Status: She is alert and oriented to person, place, and time. Mental status is at baseline.      GCS: GCS eye subscore is 4. GCS verbal subscore is 5. GCS motor subscore is 6.      Gait: Gait is intact.   Psychiatric:         Attention and Perception: Attention and perception normal.         Mood and Affect: Mood normal.         Behavior: Behavior is cooperative.          Sarah Sarmiento PA-C   [facility] [place]

## 2024-04-11 ENCOUNTER — TELEPHONE (OUTPATIENT)
Dept: PHYSICAL THERAPY | Facility: OTHER | Age: 24
End: 2024-04-11

## 2024-04-26 ENCOUNTER — NURSE TRIAGE (OUTPATIENT)
Dept: PHYSICAL THERAPY | Facility: OTHER | Age: 24
End: 2024-04-26

## 2024-04-26 DIAGNOSIS — G89.29 CHRONIC RIGHT-SIDED LOW BACK PAIN WITH RIGHT-SIDED SCIATICA: Primary | ICD-10-CM

## 2024-04-26 DIAGNOSIS — M54.41 CHRONIC RIGHT-SIDED LOW BACK PAIN WITH RIGHT-SIDED SCIATICA: Primary | ICD-10-CM

## 2024-04-26 NOTE — TELEPHONE ENCOUNTER
Additional Information   Negative: Is this related to a work injury?   Negative: Is this related to an MVA?   Negative: Are you currently recieving homecare services?    Background - Initial Assessment  Clinical complaint: Right Sided lower Back Pain that started during her pregnancy in 2023. Patient states pain comes and goes and she has it at least 5 days a week especially when she works. New flare up today. It radiates into her buttock and down the right leg and side of her thigh. No numbness. Tingling sensation in center of the tailbone/ buttock. NKI. Seen by FM on 04/10 who referred to [facility] for PT. Patient states pain is constant, worse with movement and any position. Patient described pain as sharp, shooting and throbbing.  Date of onset:  2023, flare ups every week and it lasted at least 5 days.  Frequency of pain: constant  Quality of pain: sharp, shooting, throbbing, and tingling.    Protocols used: [facility] Protocol

## 2024-04-26 NOTE — TELEPHONE ENCOUNTER
Additional Information   Negative: Has the patient had unexplained weight loss?   Negative: Does the patient have a fever?   Negative: Is the patient experiencing urine retention?   Negative: Has the patient experienced major trauma? (fall from height, high speed collision, direct blow to spine) and is also experiencing nausea, light-headedness, or loss of consciousness?   Negative: Is the patient experiencing blood in sputum?   Negative: Is the patient experiencing acute drop foot or paralysis?   Affirmative: Is this a chronic condition?    Protocols used: [facility] Protocol    Comprehensive Spine Program was reviewed in detail and what we can provide for their back pain.  Patient is agreeable to being triaged and would like to proceed with Physical Therapy.    Referral was placed for Physical Therapy at the [place] site. Patients information was sent to the  to make evaluation appointment. Patient made aware that the PT office  will be calling to schedule the appointment.  Patient was provided with the phone number to the PT office.    No further questions and/or concerns were voiced by the patient at this time. Patient states understanding of the referral that was placed.    Referral Closed.

## 2024-05-15 ENCOUNTER — EVALUATION (OUTPATIENT)
Dept: PHYSICAL THERAPY | Facility: CLINIC | Age: 24
End: 2024-05-15
Payer: COMMERCIAL

## 2024-05-15 DIAGNOSIS — M54.41 CHRONIC RIGHT-SIDED LOW BACK PAIN WITH RIGHT-SIDED SCIATICA: Primary | ICD-10-CM

## 2024-05-15 DIAGNOSIS — G89.29 CHRONIC RIGHT-SIDED LOW BACK PAIN WITH RIGHT-SIDED SCIATICA: Primary | ICD-10-CM

## 2024-05-15 PROCEDURE — 97161 PT EVAL LOW COMPLEX 20 MIN: CPT | Performed by: PHYSICAL THERAPIST

## 2024-05-15 PROCEDURE — 97110 THERAPEUTIC EXERCISES: CPT | Performed by: PHYSICAL THERAPIST

## 2024-05-15 NOTE — PROGRESS NOTES
PT Evaluation     Today's date: 5/15/2024  Patient name: Kenia Oliva  : 2000  MRN: [id number]  Referring provider: Roel Corrales PT  Dx:   Encounter Diagnosis     ICD-10-CM    1. Chronic right-sided low back pain with right-sided sciatica  M54.41 Ambulatory referral to PT spine    G89.29                      Assessment  Impairments: abnormal gait, activity intolerance, lacks appropriate home exercise program and pain with function  Symptom irritability: moderate    Assessment details: Patient was provided a home exercise program and demonstrated an understanding of exercises.  Patient was advised to stop performing home exercise program if symptoms increase or new complaints developed.  Verbal understanding demonstrated regarding home exercise program instructions.  Patient would benefit from skilled physical therapy services for prescribed exercises, manual interventions, neuromuscular re-education, education, and modalities as deemed appropriate to assist patient in achieving their maximum level of function.    Patient presents c/o Right intermittent LS/ superior buttock pain/ discomfort.   Assessment reveals: extension bias but upon return to stand she had pain with weight bearing thru heal to stand/ walk.   She presents with (-) neural tension, good le strength, (-) leg length discrepancy    IASTM to painful area/ active stretching allowed for abolished pain with return to normal walking/ wb.    Due to high co-pay- patient states that she would like to participate in HEP - she will contact department in 1 week to discuss tolerance to program issued.  We will go on a week - week basis with patient deciding if she would like to return for further sessions.    If symptoms persist with conservative means - referral to Dr. Gibbs will be considered   Understanding of Dx/Px/POC: good    Goals  STG   1.  Patient will demonstrate independence and competence with HEP 2 -4 weeks  2.  Patient will report  > 50% reduced pain 2-4 weeks    LTG   1.  Patient will report improvements with both functional and recreational abilities  4-6 weeks  2.  Patient will have no tenderness with palpation right LS region/ lumbar spinous processes  3-6 weeks.   3.  Patient will tolerate work tasks prn without pain nor discomfort produced.  3-6 weeks.     Plan  Patient would benefit from: skilled physical therapy  Planned modality interventions: cryotherapy    Planned therapy interventions: IADL retraining, joint mobilization, manual therapy, patient education, postural training, strengthening, stretching, therapeutic exercise, flexibility, home exercise program, IASTM and neuromuscular re-education    Duration in weeks: 4  Treatment plan discussed with: patient  Plan details: Patient response to treatment will be monitored each session and progressed accordingly    Thank you for this referral.     Subjective Evaluation    History of Present Illness  Mechanism of injury: During pregnancy in  - she started with right sciatica type symptoms.   Last week - she moved her furniture around and had trouble  getting up and walking afterwards  Work -  with lots of lifting -  (+) aggravation at work     Primarily - the pain is located superior right buttock  Nighttime (-)  (+) numbness - same area intermiitently  B/b (-)    Occasionally the right leg will buckle   (+) falls due to this  Patient Goals  Patient goals for therapy: decreased pain    Pain  Current pain ratin  At worst pain rating: 10  Quality: burning and radiating  Relieving factors: ice (lying on right side when parinful)  Aggravating factors: sitting  Progression: no change    Social Support  Lives with: young children and significant other        Objective     Concurrent Complaints  Negative for night pain, disturbed sleep, bladder dysfunction, bowel dysfunction and saddle (S4) numbness    Postural Observations  Seated posture: good  Standing posture:  good  Correction of posture: has no consistent effect      Palpation     Right   No palpable tenderness to the lumbar paraspinals.     Tenderness     Lumbar Spine  Tenderness in the spinous process.     Right Hip   No tenderness in the PSIS.     Neurological Testing     Sensation     Lumbar   Left   Intact: light touch    Right   Intact: light touch    Active Range of Motion     Lumbar   Flexion:  WFL  Extension:  WFL  Left lateral flexion:  WFL  Right lateral flexion:  WFL    Joint Play     Hypomobile: L4 and L5     Pain: L4, L5 and S1   Mechanical Assessment    Cervical      Thoracic      Lumbar    Standing flexion: repeated movements   Pain location:no change  Lying flexion: repeated movements  Pain location: no change  Pain level: increased  Standing extension: repeated movements  Pain location: no change  Lying extension: repeated movements  Pain level: abolished  Left Sidegliding: repeated movements  Pain location: no change  Pain intensity: better  Right sidegliding: repeated movements  Pain location: no change  Pain level: increased    Strength/Myotome Testing     Lumbar   Left   Normal strength    Right   Normal strength    Tests     Lumbar     Left   Negative crossed SLR, femoral stretch, passive SLR and slump test.     Right   Negative crossed SLR, femoral stretch, passive SLR and slump test.     Left Hip   Negative KAUSHIK and FADIR.     Right Hip   Negative KAUSHIK and FADIR.     Additional Tests Details  Supine - sit leg length (=)    Gait - antalgic when painful   pain with WB thru right heel   ( abolished after long leg traction / iASTM right sup buttock )   Graphical documentation:                Precautions: Right LBP w/ Right sciatica    [employer] [employer]  Access Code: [email or web address]    POC expires Unit limit Auth Expiration date PT/OT/ST + Visit Limit?   6/15/24 4 pending 60 pcy                           Visit/Unit Tracking  AUTH Status:  Date 5/15              Pending  Used 1                Remaining                            Manuals 5/15                         R long leg traction 10s x 6            IASTM Right sup buttock 3'                          Lumbar pa mobs Grade ii, iii db                         Neuro Re-Ed                                                                                                        Ther Ex                          Fig 4 stretch 20s x 3            Fig 4 ir/ er stretch 20s x 3 ea            Piriformis stretch 20s x 3                                      Right standing SG 5 x 2             Prone lying w/ gs 3s x 20            Prop on elbows  1 min            Press ups 10x            Loc and sag 10x                                                                 Ther Activity                                       Gait Training                                       Modalities

## 2024-06-24 ENCOUNTER — NURSE TRIAGE (OUTPATIENT)
Age: 24
End: 2024-06-24

## 2024-06-24 NOTE — TELEPHONE ENCOUNTER
Regarding: Unknown LMP  ----- Message from Doretha YOST sent at 6/24/2024  9:23 AM EDT -----  Pt called to schedule D&V  but LMP unknown

## 2024-06-24 NOTE — TELEPHONE ENCOUNTER
RN placed a call to patient. Pt unsure of last LMP since the last 2 periods have been unusual - April was mostly spotting; May she just bled for 1 day (5/10/24). RN scheduled pt for D&V on July 22nd. Provided care advise and precautions. Pt verbalized an understanding. No further questions.

## 2024-07-22 ENCOUNTER — ULTRASOUND (OUTPATIENT)
Dept: OBGYN CLINIC | Facility: CLINIC | Age: 24
End: 2024-07-22
Payer: COMMERCIAL

## 2024-07-22 VITALS — BODY MASS INDEX: 19.46 KG/M2 | DIASTOLIC BLOOD PRESSURE: 60 MMHG | WEIGHT: 107.8 LBS | SYSTOLIC BLOOD PRESSURE: 100 MMHG

## 2024-07-22 DIAGNOSIS — N91.1 SECONDARY AMENORRHEA: Primary | ICD-10-CM

## 2024-07-22 PROCEDURE — 76817 TRANSVAGINAL US OBSTETRIC: CPT | Performed by: PHYSICIAN ASSISTANT

## 2024-07-22 PROCEDURE — 99213 OFFICE O/P EST LOW 20 MIN: CPT | Performed by: PHYSICIAN ASSISTANT

## 2024-07-22 NOTE — PROGRESS NOTES
ASSESSMENT/PLAN    Early pregnancy at 10w0d with a calculated PABLO of 2025 based on ultrasound    - Genetic screening options reviewed. She is interested in NIPT, so MFM referral placed  - Prenatal care reviewed  - Pregnancy precautions reviewed  - Prenatal Vitamin recommended.       RTO for OB interview and PN-1 visit    SUBJECTIVE:    Kenia Oliva is a 24 y.o.  presenting today for verification of pregnancy.     Patient's last menstrual period was 2024. Menses are typically regular. Did have two days of light spotting sometime in mid may. Exact date unknown.     This pregnancy was planned    She is accompanied by her  and son.     Reports mild nausea.   Denies vomiting, bleeding, or cramping.     OB hx significant for:    3/2023     Is not yet taking a prenatal vitamin but plans to.     The following portions of the patient's history were reviewed and updated as appropriate: allergies, current medications, past family history, past medical history, past social history, past surgical history, and problem list.    OBJECTIVE:    /60 (BP Location: Left arm, Patient Position: Sitting, Cuff Size: Standard)   Wt 48.9 kg (107 lb 12.8 oz)   LMP 2024   BMI 19.46 kg/m²     FINDINGS:  See imaging report for details    Single intrauterine pregnancy of 10w0d gestational age.     Additional Findings: none     FHR: 170    Ultrasound Probe Disinfection    A transvaginal ultrasound was performed.   Prior to use, disinfection was performed with High Level Disinfection Process ([employer])  Probe serial number [id number]:  [id number] was used    Rosetta Parsons PA-C  24  4:07 PM

## 2024-07-22 NOTE — PROGRESS NOTES
Early ultrasound   LMP: 2024  PABLO: 2025   GA: 13w3d  Denies any Leaking of fluid or cramping  Reports light pink bleeding when she wipes but it is barely noticeable.    Planned pregnancy    Regular menses       Ultrasound Probe Disinfection  A transvaginal ultrasound was performed.   Prior to use, disinfection was performed with High Level Disinfection Process ([employer]).  Probe serial number.Probe serial number: [id number]

## 2024-07-23 ENCOUNTER — TELEPHONE (OUTPATIENT)
Age: 24
End: 2024-07-23

## 2024-07-24 ENCOUNTER — TELEPHONE (OUTPATIENT)
Age: 24
End: 2024-07-24

## 2024-07-25 ENCOUNTER — TELEPHONE (OUTPATIENT)
Age: 24
End: 2024-07-25

## 2024-07-30 ENCOUNTER — INITIAL PRENATAL (OUTPATIENT)
Dept: OBGYN CLINIC | Facility: CLINIC | Age: 24
End: 2024-07-30

## 2024-07-30 VITALS — WEIGHT: 107 LBS | BODY MASS INDEX: 18.96 KG/M2 | HEIGHT: 63 IN

## 2024-07-30 DIAGNOSIS — Z31.430 ENCOUNTER OF FEMALE FOR TESTING FOR GENETIC DISEASE CARRIER STATUS FOR PROCREATIVE MANAGEMENT: ICD-10-CM

## 2024-07-30 DIAGNOSIS — Z34.81 PRENATAL CARE, SUBSEQUENT PREGNANCY, FIRST TRIMESTER: Primary | ICD-10-CM

## 2024-07-30 DIAGNOSIS — Z36.9 ENCOUNTER FOR ANTENATAL SCREENING: ICD-10-CM

## 2024-07-30 PROCEDURE — OBC

## 2024-07-30 NOTE — PATIENT INSTRUCTIONS
Congratulations!! Please review our Pregnancy Essential Guide and [facility] L&D Virtual tour from our networks website.     [facility] Pregnancy Essentials Guide  [facility] ([email or web address])     [facility] ([employer])

## 2024-07-30 NOTE — PROGRESS NOTES
OB INTAKE INTERVIEW  Patient is 24 y.o. who presents for OB intake at 11wks  She is accompanied by herself during this encounter  The father of her baby (Antonio Saenz) is involved in the pregnancy and is 26 years old.      Last Menstrual Period: 24  Ultrasound: Measured 10 weeks 0 days on   Estimated Date of Delivery: 25 confirmed by 10 week US    Signs/Symptoms of Pregnancy  Current pregnancy symptoms: nausea some mornings, fatigue  Constipation no  Headaches no  Cramping/spotting no  PICA cravings no    Diabetes-  Body mass index is 19.26 kg/m².  If patient has 1 or more, please order early 1 hour GTT  History of GDM no  BMI >35 no  History of PCOS or current metformin use no  History of LGA/macrosomic infant (4000g/9lbs) no    If patient has 2 or more, please order early 1 hour GTT  BMI>30 no  AMA no  First degree relative with type 2 diabetes no  History of chronic HTN, hyperlipidemia, elevated A1C no  High risk race (, , ,  or ) YES    Hypertension- if you answer yes to any of the following, please order baseline preeclampsia labs (cbc, comprehensive metabolic panel, urine protein creatinine ratio, uric acid)  History of of chronic HTN no  History of gestational HTN no  History of preeclampsia, eclampsia, or HELLP syndrome no  History of diabetes no  History of lupus, autoimmune disease, kidney disease no    Thyroid- if yes order TSH with reflex T4  History of thyroid disease no    Bleeding Disorder or Hx of DVT-patient or first degree relative with history of. Order the following if not done previously.   (Factor V, antithrombin III, prothrombin gene mutation, protein C and S Ag, lupus anticoagulant, anticardiolipin, beta-2 glycoprotein)   no    OB/GYN-  History of abnormal pap smear no       Date of last pap smear 22  History of HPV no  History of Herpes/HSV no  History of other STI (gonorrhea, chlamydia, trich) no  History of  prior  YES  History of prior  no  History of  delivery prior to 36 weeks 6 days no  History of blood transfusion no  Ok for blood transfusion YES    Substance screening-   History of tobacco use YES  Currently using tobacco no  Substance Use Screen Level (N/A, LOW, HIGH) NA    MRSA Screening-   Does the pt have a hx of MRSA? no    Immunizations:  Influenza vaccine given this season yes  Discussed Tdap vaccine yes  Discussed COVID Vaccine yes     Genetic/MFM-  Do you or your partner have a history of any of the following in yourselves or first degree relatives?  Cystic fibrosis no  Spinal muscular atrophy no  Hemoglobinopathy/Sickle Cell/Thalassemia no  Fragile X Intellectual Disability no    If yes, discuss Carrier Screening and recommend consultation with MFM/Genetic Counseling and place specific [facility] Referral for.    If no, discuss Carrier Screening being completed once in a lifetime as a standard of care lab test. Place orders for Cystic Fibrosis Gene Test (XND225) and Spinal Muscular Atrophy DNA ([id number])      Appointment for Nuchal Translucency Ultrasound at [facility] scheduled for       Interview education  [facility] Pregnancy Essentials Book reviewed, discussed and attached to their AVS yes    Nurse/Family Partnership- patient may qualify no; referral placed no    Prenatal lab work scripts yes  Extra labs ordered:  Carrier screening     Aspirin/Preeclampsia Screen    Risk Level Risk Factor Recommendation   LOW Prior Uncomplicated full-term delivery YES No Aspirin recommendation        MODERATE Nulliparity no Recommend low-dose aspirin if     BMI>30 no 2 or more moderate risk factors    Family History Preeclampsia (mother/sister) no     35yr old or greater no     Black Race, Concern for SDOH/Low Socioeconomic no     IVF Pregnancy  no     Personal History Risks (low birth weight, prior adverse preg outcome, >10yr preg interval) no         HIGH History of Preeclampsia no Recommend low-dose aspirin  if     Multifetal gestation no 1 or more high risk factors    Chronic HTN no     Type 1 or 2 Diabetes no     Renal Disease no     Autoimmune Disease  no      Contraindications to ASA therapy:  NSAID/ ASA allergy: no  Nasal polyps: no  Asthma with history of ASA induced bronchospasm: no  Relative contraindications:  History of GI bleed: no  Active peptic ulcer disease: no  Severe hepatic dysfunction: no    Patient should be recommended to take ASA 162mg during this pregnancy from 12-36wks to lower her risk of preeclampsia: does not meet criteria.       The patient has a history now or in prior pregnancy notable for:  Hx of anxiety and depression      Details that I feel the provider should be aware of: This is a planned and welcomed pregnancy for Kenia and Antonio. They are previous patients of [facility]. We delivered their son Kulwinder (1) via spontaneous vaginal delivery. She had an uncomplicated pregnancy and delivery. She is overall feeling well, some nausea, and fatigue. She is aware of pn1 labs ordered and to be completed before her next appointment on 8/5. Carrier screening prior auth and scheduling discussed. Blue Folder was given and reviewed.     PN1 visit scheduled. The patient was oriented to our practice, the navigator role, reviewed delivering physicians and [facility] for Delivery. All questions were answered.    Interviewed by: Darline Carolina RN

## 2024-08-01 PROBLEM — Z3A.12 12 WEEKS GESTATION OF PREGNANCY: Status: ACTIVE | Noted: 2024-08-01

## 2024-08-01 PROBLEM — O09.899 SHORT INTERVAL BETWEEN PREGNANCIES AFFECTING PREGNANCY, ANTEPARTUM: Status: ACTIVE | Noted: 2024-08-01

## 2024-08-01 PROBLEM — Z34.03 ENCOUNTER FOR SUPERVISION OF NORMAL FIRST PREGNANCY IN THIRD TRIMESTER: Status: RESOLVED | Noted: 2022-12-07 | Resolved: 2024-08-01

## 2024-08-01 PROBLEM — R10.9 ABDOMINAL CRAMPING AFFECTING PREGNANCY: Status: RESOLVED | Noted: 2023-01-07 | Resolved: 2024-08-01

## 2024-08-01 PROBLEM — O26.899 ABDOMINAL CRAMPING AFFECTING PREGNANCY: Status: RESOLVED | Noted: 2023-01-07 | Resolved: 2024-08-01

## 2024-08-07 ENCOUNTER — ROUTINE PRENATAL (OUTPATIENT)
Dept: PERINATAL CARE | Facility: OTHER | Age: 24
End: 2024-08-07
Payer: COMMERCIAL

## 2024-08-07 VITALS
HEIGHT: 63 IN | WEIGHT: 109.8 LBS | BODY MASS INDEX: 19.45 KG/M2 | HEART RATE: 58 BPM | DIASTOLIC BLOOD PRESSURE: 60 MMHG | SYSTOLIC BLOOD PRESSURE: 108 MMHG

## 2024-08-07 DIAGNOSIS — O09.899 SHORT INTERVAL BETWEEN PREGNANCIES AFFECTING PREGNANCY, ANTEPARTUM: ICD-10-CM

## 2024-08-07 DIAGNOSIS — O99.340 DEPRESSION AFFECTING PREGNANCY: ICD-10-CM

## 2024-08-07 DIAGNOSIS — Z36.82 ENCOUNTER FOR (NT) NUCHAL TRANSLUCENCY SCAN: ICD-10-CM

## 2024-08-07 DIAGNOSIS — Z33.1 PREGNANT STATE, INCIDENTAL: ICD-10-CM

## 2024-08-07 DIAGNOSIS — F32.A DEPRESSION AFFECTING PREGNANCY: ICD-10-CM

## 2024-08-07 DIAGNOSIS — N91.1 SECONDARY AMENORRHEA: ICD-10-CM

## 2024-08-07 DIAGNOSIS — Z36.0 ENCOUNTER FOR ANTENATAL SCREENING FOR CHROMOSOMAL ANOMALIES: ICD-10-CM

## 2024-08-07 DIAGNOSIS — Z3A.12 12 WEEKS GESTATION OF PREGNANCY: Primary | ICD-10-CM

## 2024-08-07 PROCEDURE — 99243 OFF/OP CNSLTJ NEW/EST LOW 30: CPT | Performed by: OBSTETRICS & GYNECOLOGY

## 2024-08-07 PROCEDURE — 76813 OB US NUCHAL MEAS 1 GEST: CPT | Performed by: OBSTETRICS & GYNECOLOGY

## 2024-08-07 PROCEDURE — 36415 COLL VENOUS BLD VENIPUNCTURE: CPT | Performed by: OBSTETRICS & GYNECOLOGY

## 2024-08-07 NOTE — PROGRESS NOTES
Patient chose to have [facility] [id number] Non-Invasive Prenatal Screen [id number] PLUS w/ SCA, WITH fetal sex.  Patient choose to be billed through insurance.     Patient given brochure and is aware [facility] will contact patient's insurance and coordinate coverage.  Provided [facility] contact information. General inquiries [phone], Cost estimates [phone] and [facility] Billing [phone]. Website [email or web address].     Blood collection tubes labeled with patient identifiers (name, medical record number, and date of birth).     Filled out [facility] order form. Patient chose to have blood drawn in our office at time of visit. NIPS was drawn from right arm with a butterfly needle by Silvina NUNES MA.    Maternal Fetal Medicine will have results in approximately 5-7 business days and will call patient or notify via [employer].  Patient aware viewing lab result online will reveal fetal sex if ordered.    Patient verbalized understanding of all instructions and no questions at this time.

## 2024-08-07 NOTE — PROGRESS NOTES
"[facility]: Kenia Oliva was seen today for nuchal translucency ultrasound.  See ultrasound report under \"OB Procedures\" tab.   Please don't hesitate to contact our office with any concerns or questions.  -Vianney Ellsworth MD      "

## 2024-08-07 NOTE — LETTER
"2024    Rosetta Parsons PA-C  [address]  [address]  [place] [address]    Patient: Kenia Oliva   YOB: 2000   Date of Visit: 2024   Gestational age 12w2d   Nature of this communication: Routine       Dear Rosetta Parsons,    This patient was seen recently in our  office.  The content of my evaluation today is in the ultrasound report under \"OB Procedures\" tab.     Please don't hesitate to contact our office with any concerns or questions.     Sincerely,      Vianney Ellsworth MD  Attending Physician, Maternal-Fetal Medicine  [facility]      "

## 2024-08-12 DIAGNOSIS — F41.9 ANXIETY AND DEPRESSION: ICD-10-CM

## 2024-08-12 DIAGNOSIS — F32.A ANXIETY AND DEPRESSION: ICD-10-CM

## 2024-08-12 LAB
CFDNA.FET/CFDNA.TOTAL SFR FETUS: NORMAL %
CITATION REF LAB TEST: NORMAL
FET 13+18+21+X+Y ANEUP PLAS.CFDNA: NEGATIVE
FET CHR 21 TS PLAS.CFDNA QL: NEGATIVE
FET CHR 21 TS PLAS.CFDNA QL: NEGATIVE
FET MS X RISK WBC.DNA+CFDNA QL: NOT DETECTED
FET SEX PLAS.CFDNA DOSAGE CFDNA: NORMAL
FET TS 13 RISK PLAS.CFDNA QL: NEGATIVE
FET X + Y ANEUP RISK PLAS.CFDNA SEQ-IMP: NOT DETECTED
GA EST FROM CONCEPTION DATE: NORMAL D
GESTATIONAL AGE > 9:: YES
LAB DIRECTOR NAME PROVIDER: NORMAL
LAB DIRECTOR NAME PROVIDER: NORMAL
LABORATORY COMMENT REPORT: NORMAL
LIMITATIONS OF THE TEST: NORMAL
NEGATIVE PREDICTIVE VALUE: NORMAL
PERFORMANCE CHARACTERISTICS: NORMAL
POSITIVE PREDICTIVE VALUE: NORMAL
REF LAB TEST METHOD: NORMAL
SL AMB NOTE:: NORMAL
TEST PERFORMANCE INFO SPEC: NORMAL

## 2024-08-12 RX ORDER — ESCITALOPRAM OXALATE 10 MG/1
10 TABLET ORAL DAILY
Qty: 90 TABLET | Refills: 0 | Status: SHIPPED | OUTPATIENT
Start: 2024-08-12 | End: 2024-11-10

## 2024-09-04 ENCOUNTER — TELEPHONE (OUTPATIENT)
Dept: OBGYN CLINIC | Facility: CLINIC | Age: 24
End: 2024-09-04

## 2024-09-05 ENCOUNTER — ROUTINE PRENATAL (OUTPATIENT)
Age: 24
End: 2024-09-05
Payer: COMMERCIAL

## 2024-09-05 VITALS
OXYGEN SATURATION: 98 % | SYSTOLIC BLOOD PRESSURE: 96 MMHG | HEIGHT: 63 IN | DIASTOLIC BLOOD PRESSURE: 52 MMHG | BODY MASS INDEX: 19.67 KG/M2 | WEIGHT: 111 LBS | HEART RATE: 68 BPM

## 2024-09-05 DIAGNOSIS — Z34.82 PRENATAL CARE, SUBSEQUENT PREGNANCY IN SECOND TRIMESTER: Primary | ICD-10-CM

## 2024-09-05 DIAGNOSIS — Z36.9 UNSPECIFIED ANTENATAL SCREENING: ICD-10-CM

## 2024-09-05 DIAGNOSIS — Z11.3 SCREEN FOR STD (SEXUALLY TRANSMITTED DISEASE): ICD-10-CM

## 2024-09-05 LAB
SL AMB  POCT GLUCOSE, UA: NORMAL
SL AMB POCT URINE PROTEIN: NORMAL

## 2024-09-05 PROCEDURE — PNV: Performed by: OBSTETRICS & GYNECOLOGY

## 2024-09-05 PROCEDURE — 87591 N.GONORRHOEAE DNA AMP PROB: CPT | Performed by: OBSTETRICS & GYNECOLOGY

## 2024-09-05 PROCEDURE — 87491 CHLMYD TRACH DNA AMP PROBE: CPT | Performed by: OBSTETRICS & GYNECOLOGY

## 2024-09-05 PROCEDURE — 81002 URINALYSIS NONAUTO W/O SCOPE: CPT | Performed by: OBSTETRICS & GYNECOLOGY

## 2024-09-05 NOTE — PROGRESS NOTES
Kenia Oliva is here for her first prenatal visit  Age: 24 y.o.  LMP: Patient's last menstrual period was 2024.    Gestational age 16w3d consistent with early US Yes. Unknown LMP   2  Para 1001           Previous C Section: No    She denies nausea and vomiting  She has had no vaginal bleeding  Patients has no complaints    She  is planning consultation with maternal fetal medicine for a sequential screen and genetic screening.   SP nuchal on   Nipt- low risk    Allergies: Patient has no known allergies.  Medication use :   Current Outpatient Medications   Medication Sig Dispense Refill    escitalopram (LEXAPRO) 10 mg tablet Take 1 tablet (10 mg total) by mouth daily 90 tablet 0    Prenatal MV & Min w/FA-DHA (Prenatal Gummies) 0.18-25 MG CHEW Chew daily       No current facility-administered medications for this visit.     She is a non-smoker  Denies alcohol and drug use    In this pregnancy her  medical history is significant for anxiety and depression, hx asthma in childhood- exercise induced.   Prenatal Labs   Not yet completed  AFP ordered today  Pap 22- negative      Her obstetrical, medical, surgical and family history was reviewed  Her physical exam was normal  A Pap smear was not obtained, Gonorrhea and Chlamydia testing was obtained    Discussed as well during this visit was diet, prenatal vitamins, prenatal visits, lab testing, breast feeding, vaccinations, maternal fetal medicine consultations, prevention of exposure to infectious diseases and toxic chemicals, lifestyle.    Risk factors for this pregnancy include: none

## 2024-09-06 LAB
C TRACH DNA SPEC QL NAA+PROBE: NEGATIVE
N GONORRHOEA DNA SPEC QL NAA+PROBE: NEGATIVE

## 2024-09-27 ENCOUNTER — APPOINTMENT (OUTPATIENT)
Age: 24
End: 2024-09-27
Payer: COMMERCIAL

## 2024-09-27 DIAGNOSIS — Z36.9 UNSPECIFIED ANTENATAL SCREENING: ICD-10-CM

## 2024-09-27 PROCEDURE — 82105 ALPHA-FETOPROTEIN SERUM: CPT

## 2024-09-27 PROCEDURE — 36415 COLL VENOUS BLD VENIPUNCTURE: CPT

## 2024-09-30 ENCOUNTER — ROUTINE PRENATAL (OUTPATIENT)
Dept: PERINATAL CARE | Facility: OTHER | Age: 24
End: 2024-09-30
Payer: COMMERCIAL

## 2024-09-30 VITALS
SYSTOLIC BLOOD PRESSURE: 101 MMHG | HEIGHT: 62 IN | DIASTOLIC BLOOD PRESSURE: 48 MMHG | HEART RATE: 63 BPM | WEIGHT: 117.6 LBS | BODY MASS INDEX: 21.64 KG/M2

## 2024-09-30 DIAGNOSIS — Z36.86 ENCOUNTER FOR ANTENATAL SCREENING FOR CERVICAL LENGTH: ICD-10-CM

## 2024-09-30 DIAGNOSIS — Z3A.20 20 WEEKS GESTATION OF PREGNANCY: Primary | ICD-10-CM

## 2024-09-30 DIAGNOSIS — Z36.3 ENCOUNTER FOR ANTENATAL SCREENING FOR MALFORMATIONS: ICD-10-CM

## 2024-09-30 LAB
2ND TRIMESTER 4 SCREEN SERPL-IMP: NORMAL
AFP ADJ MOM SERPL: 1.16
AFP INTERP AMN-IMP: NORMAL
AFP INTERP SERPL-IMP: NORMAL
AFP INTERP SERPL-IMP: NORMAL
AFP SERPL-MCNC: 75.6 NG/ML
AGE AT DELIVERY: 24.6 YR
GA METHOD: NORMAL
GA: 19.6 WEEKS
IDDM PATIENT QL: NO
MULTIPLE PREGNANCY: NO
NEURAL TUBE DEFECT RISK FETUS: 7366 %

## 2024-09-30 PROCEDURE — 76805 OB US >/= 14 WKS SNGL FETUS: CPT | Performed by: OBSTETRICS & GYNECOLOGY

## 2024-09-30 PROCEDURE — 76817 TRANSVAGINAL US OBSTETRIC: CPT | Performed by: OBSTETRICS & GYNECOLOGY

## 2024-09-30 NOTE — PROGRESS NOTES
This patient received  care under my supervision on 24 at 20w0d gestational age at [facility].  The note is contained in the ultrasound report located under OB Procedures tab in Epic.  Please call our office at [phone] with questions.  -Jessica Reyes MD

## 2024-10-30 ENCOUNTER — ROUTINE PRENATAL (OUTPATIENT)
Age: 24
End: 2024-10-30
Payer: COMMERCIAL

## 2024-10-30 VITALS
HEART RATE: 64 BPM | SYSTOLIC BLOOD PRESSURE: 110 MMHG | DIASTOLIC BLOOD PRESSURE: 60 MMHG | BODY MASS INDEX: 23.11 KG/M2 | HEIGHT: 62 IN | WEIGHT: 125.6 LBS

## 2024-10-30 DIAGNOSIS — Z11.3 SCREEN FOR STD (SEXUALLY TRANSMITTED DISEASE): ICD-10-CM

## 2024-10-30 DIAGNOSIS — Z34.82 PRENATAL CARE, SUBSEQUENT PREGNANCY, SECOND TRIMESTER: ICD-10-CM

## 2024-10-30 DIAGNOSIS — Z3A.24 24 WEEKS GESTATION OF PREGNANCY: Primary | ICD-10-CM

## 2024-10-30 DIAGNOSIS — O09.899 SHORT INTERVAL BETWEEN PREGNANCIES AFFECTING PREGNANCY, ANTEPARTUM: ICD-10-CM

## 2024-10-30 LAB
SL AMB  POCT GLUCOSE, UA: NORMAL
SL AMB POCT URINE PROTEIN: NORMAL

## 2024-10-30 PROCEDURE — 81002 URINALYSIS NONAUTO W/O SCOPE: CPT | Performed by: OBSTETRICS & GYNECOLOGY

## 2024-10-30 PROCEDURE — PNV: Performed by: OBSTETRICS & GYNECOLOGY

## 2024-10-30 NOTE — ASSESSMENT & PLAN NOTE
Pt doing well today, no complaints  +FM. Denies ctx vb and lof  Last visit at 16w.   Patient has still not completed her prenatal labs- reviewed this with her today. She will complete as soon as able.  AFP completed- negative.  Sp normal anatomy scan   28w labs ordered this visit.   Precautions reviewed. RTO in 4 weeks

## 2024-10-30 NOTE — PROGRESS NOTES
Patient presents for a routine prenatal visit    24W2D  Good Fetal Movement  No LOF,bleeding,discharge or cramping.  No current complaints at this time.  AFP completed: neg  28 week labs ordered today.  Declined flu vaccine.

## 2024-10-30 NOTE — PROGRESS NOTES
"Problem List Items Addressed This Visit       24 weeks gestation of pregnancy - Primary     Pt doing well today, no complaints  +FM. Denies ctx vb and lof  Last visit at 16w.   Patient has still not completed her prenatal labs- reviewed this with her today. She will complete as soon as able.  AFP completed- negative.  Sp normal anatomy scan   28w labs ordered this visit.   Precautions reviewed. RTO in 4 weeks         Short interval between pregnancies affecting pregnancy, antepartum     32w growth scan scheduled          Other Visit Diagnoses       Prenatal care, subsequent pregnancy, second trimester        Relevant Orders    POCT urine dip (Completed)    Glucose, 1H PG    Screen for STD (sexually transmitted disease)                Kenia Oliva is a 24 y.o.  at 24w2d who presents today for routine prenatal visit.     /60 (BP Location: Left arm, Patient Position: Sitting, Cuff Size: Adult)   Pulse 64   Ht 5' 2\" (1.575 m)   Wt 57 kg (125 lb 9.6 oz)   LMP 2024   BMI 22.97 kg/m²         "

## 2024-11-01 ENCOUNTER — TELEPHONE (OUTPATIENT)
Dept: PERINATAL CARE | Facility: OTHER | Age: 24
End: 2024-11-01

## 2024-11-01 NOTE — TELEPHONE ENCOUNTER
[facility] 11/1 at 1330 notifying patient of her appointment change. New appointment is 12/24 at 2pm at the same [facility] location.

## 2024-11-27 ENCOUNTER — ROUTINE PRENATAL (OUTPATIENT)
Age: 24
End: 2024-11-27
Payer: COMMERCIAL

## 2024-11-27 VITALS
HEART RATE: 73 BPM | WEIGHT: 132.2 LBS | DIASTOLIC BLOOD PRESSURE: 62 MMHG | HEIGHT: 62 IN | BODY MASS INDEX: 24.33 KG/M2 | SYSTOLIC BLOOD PRESSURE: 120 MMHG

## 2024-11-27 DIAGNOSIS — O09.899 SHORT INTERVAL BETWEEN PREGNANCIES AFFECTING PREGNANCY, ANTEPARTUM: ICD-10-CM

## 2024-11-27 DIAGNOSIS — Z34.82 PRENATAL CARE, SUBSEQUENT PREGNANCY, SECOND TRIMESTER: ICD-10-CM

## 2024-11-27 DIAGNOSIS — Z3A.28 28 WEEKS GESTATION OF PREGNANCY: Primary | ICD-10-CM

## 2024-11-27 DIAGNOSIS — Z23 NEED FOR TDAP VACCINATION: ICD-10-CM

## 2024-11-27 LAB
SL AMB  POCT GLUCOSE, UA: ABNORMAL
SL AMB POCT URINE PROTEIN: ABNORMAL

## 2024-11-27 PROCEDURE — 81002 URINALYSIS NONAUTO W/O SCOPE: CPT | Performed by: OBSTETRICS & GYNECOLOGY

## 2024-11-27 PROCEDURE — 90715 TDAP VACCINE 7 YRS/> IM: CPT | Performed by: OBSTETRICS & GYNECOLOGY

## 2024-11-27 PROCEDURE — 90471 IMMUNIZATION ADMIN: CPT | Performed by: OBSTETRICS & GYNECOLOGY

## 2024-11-27 PROCEDURE — PNV: Performed by: OBSTETRICS & GYNECOLOGY

## 2024-11-27 NOTE — ASSESSMENT & PLAN NOTE
Pt doing well today. No complaints.   +FM. Denies ctx, vb and lof.   Pt has yet to complete prenatal labs or 1h gtt. Reviewed again the importance of completing blood work. She plans to do this soon.  Tdap given today .  Delivery consents signed today.     The patient was counseled about the labor process.     She was counseled about the possible need for operative delivery using the vacuum or forceps and the indications for doing so. She was counseled that there is a small risk of  complications including intracranial hemorrhage, lacerations, nerve damage or fracture as well as the increased risk for more severe maternal laceration. The patient signed consent.    She was counseled regarding potential reasons that she may need a  section including arrest of dilation/descent, non-reassuring fetal status, or worsening maternal status.      She was counseled on the risks of  including bleeding, infection, and injury to surrounding structures including the bowel and bladder. She was counseled that there are medical and surgical methods to manage excessive postpartum bleeding. She was counseled that in the event of excessive blood loss, she may require blood transfusion which includes a small risk of blood borne diseases such as hepatitis and HIV. The patient is OK with receiving a blood transfusion if necessary.  The patient had an opportunity to ask questions and signed consent.

## 2024-11-27 NOTE — PROGRESS NOTES
Patient presents for a routine prenatal visit    28W2D  Good Fetal Movement  No LOF,bleeding,discharge or cramping.  No current complaints at this time.    Yellow folder given to patient and reviewed at today's visit.   Plans on breastfeeding, breast pump ordered today.  Prenatal labs are not completed.  Declined flu vaccine  Tdap given in R Deltoid. VIS given. Tolerated well.  Lot #: [id number]  Exp: 2026

## 2024-11-27 NOTE — PROGRESS NOTES
Problem List Items Addressed This Visit       28 weeks gestation of pregnancy - Primary    Pt doing well today. No complaints.   +FM. Denies ctx, vb and lof.   Pt has yet to complete prenatal labs or 1h gtt. Reviewed again the importance of completing blood work. She plans to do this soon.  Tdap given today .  Delivery consents signed today.     The patient was counseled about the labor process.     She was counseled about the possible need for operative delivery using the vacuum or forceps and the indications for doing so. She was counseled that there is a small risk of  complications including intracranial hemorrhage, lacerations, nerve damage or fracture as well as the increased risk for more severe maternal laceration. The patient signed consent.    She was counseled regarding potential reasons that she may need a  section including arrest of dilation/descent, non-reassuring fetal status, or worsening maternal status.      She was counseled on the risks of  including bleeding, infection, and injury to surrounding structures including the bowel and bladder. She was counseled that there are medical and surgical methods to manage excessive postpartum bleeding. She was counseled that in the event of excessive blood loss, she may require blood transfusion which includes a small risk of blood borne diseases such as hepatitis and HIV. The patient is OK with receiving a blood transfusion if necessary.  The patient had an opportunity to ask questions and signed consent.               Short interval between pregnancies affecting pregnancy, antepartum    Has 32w growth u/s          Other Visit Diagnoses         Prenatal care, subsequent pregnancy, second trimester        Relevant Orders    POCT urine dip (Completed)      Need for Tdap vaccination        Relevant Orders    Tdap Vaccine greater than or equal to 6yo (Completed)            Kenia Oliva is a 24 y.o.  at 28w2d who presents  "today for routine prenatal visit.     /62 (BP Location: Left arm, Patient Position: Sitting, Cuff Size: Adult)   Pulse 73   Ht 5' 2\" (1.575 m)   Wt 60 kg (132 lb 3.2 oz)   LMP 04/19/2024   BMI 24.18 kg/m²       FH 26 cm    "

## 2024-12-05 ENCOUNTER — PATIENT MESSAGE (OUTPATIENT)
Age: 24
End: 2024-12-05

## 2024-12-06 ENCOUNTER — APPOINTMENT (OUTPATIENT)
Age: 24
End: 2024-12-06
Payer: COMMERCIAL

## 2024-12-06 DIAGNOSIS — Z34.82 PRENATAL CARE, SUBSEQUENT PREGNANCY, SECOND TRIMESTER: ICD-10-CM

## 2024-12-06 DIAGNOSIS — Z34.81 PRENATAL CARE, SUBSEQUENT PREGNANCY, FIRST TRIMESTER: ICD-10-CM

## 2024-12-06 LAB
ABO GROUP BLD: NORMAL
BACTERIA UR QL AUTO: ABNORMAL /HPF
BASOPHILS # BLD AUTO: 0.02 THOUSANDS/ÂΜL (ref 0–0.1)
BASOPHILS NFR BLD AUTO: 0 % (ref 0–1)
BILIRUB UR QL STRIP: NEGATIVE
BLD GP AB SCN SERPL QL: NEGATIVE
CLARITY UR: CLEAR
COLOR UR: ABNORMAL
EOSINOPHIL # BLD AUTO: 0.19 THOUSAND/ÂΜL (ref 0–0.61)
EOSINOPHIL NFR BLD AUTO: 2 % (ref 0–6)
ERYTHROCYTE [DISTWIDTH] IN BLOOD BY AUTOMATED COUNT: 11.9 % (ref 11.6–15.1)
GLUCOSE 1H P 50 G GLC PO SERPL-MCNC: 111 MG/DL (ref 70–134)
GLUCOSE UR STRIP-MCNC: ABNORMAL MG/DL
HBV SURFACE AG SER QL: NORMAL
HCT VFR BLD AUTO: 31.4 % (ref 34.8–46.1)
HCV AB SER QL: NORMAL
HGB BLD-MCNC: 10.6 G/DL (ref 11.5–15.4)
HGB UR QL STRIP.AUTO: NEGATIVE
HIV 1+2 AB+HIV1 P24 AG SERPL QL IA: NORMAL
HIV 2 AB SERPL QL IA: NORMAL
HIV1 AB SERPL QL IA: NORMAL
HIV1 P24 AG SERPL QL IA: NORMAL
IMM GRANULOCYTES # BLD AUTO: 0.07 THOUSAND/UL (ref 0–0.2)
IMM GRANULOCYTES NFR BLD AUTO: 1 % (ref 0–2)
KETONES UR STRIP-MCNC: NEGATIVE MG/DL
LEUKOCYTE ESTERASE UR QL STRIP: NEGATIVE
LYMPHOCYTES # BLD AUTO: 1.09 THOUSANDS/ÂΜL (ref 0.6–4.47)
LYMPHOCYTES NFR BLD AUTO: 12 % (ref 14–44)
MCH RBC QN AUTO: 29.4 PG (ref 26.8–34.3)
MCHC RBC AUTO-ENTMCNC: 33.8 G/DL (ref 31.4–37.4)
MCV RBC AUTO: 87 FL (ref 82–98)
MONOCYTES # BLD AUTO: 0.6 THOUSAND/ÂΜL (ref 0.17–1.22)
MONOCYTES NFR BLD AUTO: 7 % (ref 4–12)
NEUTROPHILS # BLD AUTO: 7.02 THOUSANDS/ÂΜL (ref 1.85–7.62)
NEUTS SEG NFR BLD AUTO: 78 % (ref 43–75)
NITRITE UR QL STRIP: NEGATIVE
NON-SQ EPI CELLS URNS QL MICRO: ABNORMAL /HPF
NRBC BLD AUTO-RTO: 0 /100 WBCS
PH UR STRIP.AUTO: 7 [PH]
PLATELET # BLD AUTO: 180 THOUSANDS/UL (ref 149–390)
PMV BLD AUTO: 10.8 FL (ref 8.9–12.7)
PROT UR STRIP-MCNC: NEGATIVE MG/DL
RBC # BLD AUTO: 3.6 MILLION/UL (ref 3.81–5.12)
RBC #/AREA URNS AUTO: ABNORMAL /HPF
RH BLD: POSITIVE
RUBV IGG SERPL IA-ACNC: 21.3 IU/ML
SP GR UR STRIP.AUTO: 1.01 (ref 1–1.03)
TREPONEMA PALLIDUM IGG+IGM AB [PRESENCE] IN SERUM OR PLASMA BY IMMUNOASSAY: NORMAL
UROBILINOGEN UR STRIP-ACNC: <2 MG/DL
WBC # BLD AUTO: 8.99 THOUSAND/UL (ref 4.31–10.16)
WBC #/AREA URNS AUTO: ABNORMAL /HPF

## 2024-12-06 PROCEDURE — 86850 RBC ANTIBODY SCREEN: CPT

## 2024-12-06 PROCEDURE — 86780 TREPONEMA PALLIDUM: CPT

## 2024-12-06 PROCEDURE — 81001 URINALYSIS AUTO W/SCOPE: CPT

## 2024-12-06 PROCEDURE — 86803 HEPATITIS C AB TEST: CPT

## 2024-12-06 PROCEDURE — 86762 RUBELLA ANTIBODY: CPT

## 2024-12-06 PROCEDURE — 86900 BLOOD TYPING SEROLOGIC ABO: CPT

## 2024-12-06 PROCEDURE — 36415 COLL VENOUS BLD VENIPUNCTURE: CPT

## 2024-12-06 PROCEDURE — 87389 HIV-1 AG W/HIV-1&-2 AB AG IA: CPT

## 2024-12-06 PROCEDURE — 82950 GLUCOSE TEST: CPT

## 2024-12-06 PROCEDURE — 87086 URINE CULTURE/COLONY COUNT: CPT

## 2024-12-06 PROCEDURE — 87340 HEPATITIS B SURFACE AG IA: CPT

## 2024-12-06 PROCEDURE — 86901 BLOOD TYPING SEROLOGIC RH(D): CPT

## 2024-12-06 PROCEDURE — 85025 COMPLETE CBC W/AUTO DIFF WBC: CPT

## 2024-12-07 ENCOUNTER — RESULTS FOLLOW-UP (OUTPATIENT)
Dept: LABOR AND DELIVERY | Facility: HOSPITAL | Age: 24
End: 2024-12-07

## 2024-12-07 LAB — BACTERIA UR CULT: NORMAL

## 2024-12-10 ENCOUNTER — RESULTS FOLLOW-UP (OUTPATIENT)
Dept: OBGYN CLINIC | Facility: CLINIC | Age: 24
End: 2024-12-10

## 2024-12-13 ENCOUNTER — ROUTINE PRENATAL (OUTPATIENT)
Age: 24
End: 2024-12-13

## 2024-12-13 VITALS
WEIGHT: 132 LBS | HEIGHT: 62 IN | SYSTOLIC BLOOD PRESSURE: 110 MMHG | BODY MASS INDEX: 24.29 KG/M2 | DIASTOLIC BLOOD PRESSURE: 68 MMHG

## 2024-12-13 DIAGNOSIS — Z3A.30 30 WEEKS GESTATION OF PREGNANCY: Primary | ICD-10-CM

## 2024-12-13 DIAGNOSIS — R12 HEART BURN: ICD-10-CM

## 2024-12-13 DIAGNOSIS — O09.899 SHORT INTERVAL BETWEEN PREGNANCIES AFFECTING PREGNANCY, ANTEPARTUM: ICD-10-CM

## 2024-12-13 DIAGNOSIS — Z34.93 PRENATAL CARE, THIRD TRIMESTER: ICD-10-CM

## 2024-12-13 LAB
SL AMB  POCT GLUCOSE, UA: NORMAL
SL AMB POCT URINE PROTEIN: NORMAL

## 2024-12-13 RX ORDER — FAMOTIDINE 20 MG/1
20 TABLET, FILM COATED ORAL DAILY
Qty: 30 TABLET | Refills: 1 | Status: SHIPPED | OUTPATIENT
Start: 2024-12-13

## 2024-12-13 NOTE — PROGRESS NOTES
Patient is here for prenatal ob visit today.  GA: 30w4d  PABLO: 25    Denies LOF, VB, CTX  +FM    28 week labs completed    Blue folder given at intake  Yellow folder given   Delivery consent signed  Birth plan not yet returned  Breast pump ordered  Peds referral . Patient has a PEDS already .  Tdap given  Flu declined  Rhogam not needed. O+    No question's or concerns at this time. NONE   Patient reports positive fetal movement and no lost of fluids and no cramping .

## 2024-12-13 NOTE — PROGRESS NOTES
"Name: Kenia Oliva      : 2000      MRN: [id number]  Encounter Provider: Stormy Vogel PA-C  Encounter Date: 2024   Encounter department: [facility] [place]  :  Assessment & Plan  Short interval between pregnancies affecting pregnancy, antepartum    Short interval pregnancy 32 wk growth scan    30 weeks gestation of pregnancy    , 30w4d  Overall doing well, reporting good FM.   TDAP: completed  Pediatrician selected  Contraceptive options were reviewed both hormonal and non-hormonal.  If planning to breastfeed would avoid estrogen containing products as this may affect milk supply.    Reviewed process for [facility] and encouraged pt to call with any decreases in fetal movement    Patient should call if she experiences: vaginal bleeding, change in discharge, LOF, contractions or dysuria.  Discussed with patient warning signs of pre-eclampsia: Headaches, edema, visual changes or abdominal pain  Discussed that pregnancy can increase their risk for blood clots. Discussed the warning signs: acute SOB, calf/leg pain, chest pain        Prenatal care, third trimester  POCT urine -/-  Orders:    POCT urine dip    Heart burn    Patient has tried tums without resolution  Pepcid once daily, if symptoms do not improve consider increasing to twice daily  Avoid triggering foods  Additional education provided in AVS        History of Present Illness   HPI  Kenia Oliva is a 24 y.o.  here for OB visit at 30w4d weeks. TWG 11.3 kg (25 lb).  No health concerns.     Denies unusual vaginal discharge, LOF, VB, or ctx. Reports Fetus is active.     Patient notes heartburn, has tried tums but is not significantly palliating.      Objective   /68 (BP Location: Right arm, Patient Position: Sitting, Cuff Size: Large)   Ht 5' 2\" (1.575 m)   Wt 59.9 kg (132 lb)   LMP 2024   BMI 24.14 kg/m²     Pregravid Weight/BMI: 48.5 kg (107 lb) (BMI 19.57)  Total Weight " Gain: 11.3 kg (25 lb)     Fetal Heart Rate: 140  Fundal Height (cm): 29 cm    Stormy Vogel PA-C

## 2024-12-19 ENCOUNTER — TELEPHONE (OUTPATIENT)
Dept: OBGYN CLINIC | Facility: CLINIC | Age: 24
End: 2024-12-19

## 2024-12-19 NOTE — TELEPHONE ENCOUNTER
----- Message from Darline YOST sent at 7/30/2024  3:10 PM EDT -----  Regarding: 3rd Trimester Call Due  12/9-1/6

## 2024-12-23 ENCOUNTER — TELEPHONE (OUTPATIENT)
Facility: HOSPITAL | Age: 24
End: 2024-12-23

## 2024-12-23 NOTE — TELEPHONE ENCOUNTER
Left voicemail informing patient of telemed visit for tomorrow. She will still report to the [place] office for her ultrasound tomorrow and at the completion of her ultrasound she will meet with the Dr using a secure video platform (Telepo). Requested she give our office a call back at [phone] with any questions.

## 2024-12-24 ENCOUNTER — ULTRASOUND (OUTPATIENT)
Dept: PERINATAL CARE | Facility: OTHER | Age: 24
End: 2024-12-24
Payer: COMMERCIAL

## 2024-12-24 VITALS
HEIGHT: 62 IN | BODY MASS INDEX: 24.88 KG/M2 | DIASTOLIC BLOOD PRESSURE: 64 MMHG | HEART RATE: 85 BPM | WEIGHT: 135.2 LBS | SYSTOLIC BLOOD PRESSURE: 108 MMHG

## 2024-12-24 DIAGNOSIS — Z3A.32 32 WEEKS GESTATION OF PREGNANCY: Primary | ICD-10-CM

## 2024-12-24 DIAGNOSIS — Z36.89 ENCOUNTER FOR ULTRASOUND TO ASSESS FETAL GROWTH: ICD-10-CM

## 2024-12-24 DIAGNOSIS — O09.899 SHORT INTERVAL BETWEEN PREGNANCIES AFFECTING PREGNANCY, ANTEPARTUM: ICD-10-CM

## 2024-12-24 PROCEDURE — 76816 OB US FOLLOW-UP PER FETUS: CPT | Performed by: OBSTETRICS & GYNECOLOGY

## 2024-12-24 PROCEDURE — 99212 OFFICE O/P EST SF 10 MIN: CPT | Performed by: OBSTETRICS & GYNECOLOGY

## 2024-12-24 NOTE — PROGRESS NOTES
The patient was located in the Maternal Fetal Medicine [place] office ([address] [place] [address])  at the time of the visit, a state in which Dr. Francisco holds an active license. Patient acknowledged consent and understanding of privacy and security of the video platform. The patient has agreed to participate and understands they can discontinue the visit at any time. The patient was counseled via synchronous telemedicine encounter using [employer].

## 2024-12-24 NOTE — LETTER
"  Date: 2024    Cheyenne Good DO  [address]  [address]  [place] [address]    Patient: Kenia Oliva   YOB: 2000   Date of Visit: 2024   Gestational age 32w1d   Nature of this communication: Routine       This patient was seen recently in our  office.  Please see ultrasound report under \"OB Procedures\" tab.  Please don't hesitate to contact our office with any concerns or questions.      Sincerely,      Sofiya Francisco MD  Attending Physician, Maternal-Fetal Medicine  [facility]       "

## 2024-12-24 NOTE — PROGRESS NOTES
"[facility]: Kenia Oliva was seen today at 32w1d for fetal growth assessment ultrasound.  See ultrasound report under \"OB Procedures\" tab.  Please don't hesitate to contact our office with any concerns or questions.  -Sofyia Francisco MD      "

## 2025-01-02 ENCOUNTER — ROUTINE PRENATAL (OUTPATIENT)
Age: 25
End: 2025-01-02
Payer: COMMERCIAL

## 2025-01-02 VITALS
WEIGHT: 135.4 LBS | SYSTOLIC BLOOD PRESSURE: 102 MMHG | HEART RATE: 70 BPM | HEIGHT: 62 IN | DIASTOLIC BLOOD PRESSURE: 62 MMHG | BODY MASS INDEX: 24.92 KG/M2

## 2025-01-02 DIAGNOSIS — O09.899 SHORT INTERVAL BETWEEN PREGNANCIES AFFECTING PREGNANCY, ANTEPARTUM: ICD-10-CM

## 2025-01-02 DIAGNOSIS — Z34.83 PRENATAL CARE, SUBSEQUENT PREGNANCY, THIRD TRIMESTER: ICD-10-CM

## 2025-01-02 DIAGNOSIS — Z29.11 NEED FOR RSV VACCINATION: ICD-10-CM

## 2025-01-02 DIAGNOSIS — Z3A.33 33 WEEKS GESTATION OF PREGNANCY: Primary | ICD-10-CM

## 2025-01-02 LAB
SL AMB  POCT GLUCOSE, UA: NORMAL
SL AMB POCT URINE PROTEIN: NORMAL

## 2025-01-02 PROCEDURE — 90678 RSV VACC PREF BIVALENT IM: CPT | Performed by: OBSTETRICS & GYNECOLOGY

## 2025-01-02 PROCEDURE — PNV: Performed by: OBSTETRICS & GYNECOLOGY

## 2025-01-02 PROCEDURE — 81002 URINALYSIS NONAUTO W/O SCOPE: CPT | Performed by: OBSTETRICS & GYNECOLOGY

## 2025-01-02 PROCEDURE — 90471 IMMUNIZATION ADMIN: CPT | Performed by: OBSTETRICS & GYNECOLOGY

## 2025-01-02 NOTE — ASSESSMENT & PLAN NOTE
Pt doing well today. No complaints  +FM. Denies ctx, vb and lof.   She is up to date on care  Sp normal growth u/s on 12/2  RSV vaccine given today   Perineal massage reviewed   Precautions reviewed. RTO in 2 weeks

## 2025-01-02 NOTE — PROGRESS NOTES
"Problem List Items Addressed This Visit       33 weeks gestation of pregnancy - Primary    Pt doing well today. No complaints  +FM. Denies ctx, vb and lof.   She is up to date on care  Sp normal growth u/s on   RSV vaccine given today   Perineal massage reviewed   Precautions reviewed. RTO in 2 weeks         Short interval between pregnancies affecting pregnancy, antepartum    Growth u/s on  EFW [place] 1714 grams - 3 lbs 12 oz (16%)           Other Visit Diagnoses         Prenatal care, subsequent pregnancy, third trimester        Relevant Orders    POCT urine dip            Kenia Oliva is a 24 y.o.  at 33w3d who presents today for routine prenatal visit.     /62 (BP Location: Left arm, Patient Position: Sitting, Cuff Size: Adult)   Pulse 70   Ht 5' 2\" (1.575 m)   Wt 61.4 kg (135 lb 6.4 oz)   LMP 2024   BMI 24.76 kg/m²       FH 33 cm    "

## 2025-01-02 NOTE — PROGRESS NOTES
Patient presents for a routine prenatal visit    33W3D  Good Fetal Movement  No LOF,bleeding,discharge or cramping.  No current complaints at this time.   Delivery consent is signed  UTD on Tdap vaccine.  Declined flu vaccine.  RSV given in R deltoid. VIS given. Tolerated well.  Lot #: [id number]  10/31/2025

## 2025-01-03 LAB
DME PARACHUTE DELIVERY DATE ACTUAL: NORMAL
DME PARACHUTE DELIVERY DATE REQUESTED: NORMAL
DME PARACHUTE ITEM DESCRIPTION: NORMAL
DME PARACHUTE ORDER STATUS: NORMAL
DME PARACHUTE SUPPLIER NAME: NORMAL
DME PARACHUTE SUPPLIER PHONE: NORMAL

## 2025-01-13 NOTE — PROGRESS NOTES
Pt is here for routine ob visit   No concerns at this time  Urine neg/neg   No LOF,VB,Contractions  +FM   UTD tdap/RSV  Declined flu vaccine   Delivery consent signed at previous visit

## 2025-01-14 ENCOUNTER — ROUTINE PRENATAL (OUTPATIENT)
Age: 25
End: 2025-01-14
Payer: COMMERCIAL

## 2025-01-14 VITALS — SYSTOLIC BLOOD PRESSURE: 112 MMHG | WEIGHT: 138 LBS | DIASTOLIC BLOOD PRESSURE: 54 MMHG | BODY MASS INDEX: 25.24 KG/M2

## 2025-01-14 DIAGNOSIS — Z34.83 PRENATAL CARE, SUBSEQUENT PREGNANCY, THIRD TRIMESTER: Primary | ICD-10-CM

## 2025-01-14 DIAGNOSIS — Z3A.35 35 WEEKS GESTATION OF PREGNANCY: ICD-10-CM

## 2025-01-14 LAB
SL AMB  POCT GLUCOSE, UA: NEGATIVE
SL AMB POCT URINE PROTEIN: NEGATIVE

## 2025-01-14 PROCEDURE — PNV: Performed by: STUDENT IN AN ORGANIZED HEALTH CARE EDUCATION/TRAINING PROGRAM

## 2025-01-14 PROCEDURE — 81002 URINALYSIS NONAUTO W/O SCOPE: CPT | Performed by: STUDENT IN AN ORGANIZED HEALTH CARE EDUCATION/TRAINING PROGRAM

## 2025-01-14 NOTE — PROGRESS NOTES
24 y.o.  at 35w1d, here for routine OB visit. Feeling well overall and without concerns. Good FM. Denies LOF, VB, contractions.      Problem List Items Addressed This Visit          Obstetrics/Gynecology    35 weeks gestation of pregnancy    -precautions reviewed  -s/p Tdap/RSV vaccines  -GBS next visit. reviewed  -prepregnancy BMI 19 with goal weight gain 25-35#: TWG = 31#           Other Visit Diagnoses         Prenatal care, subsequent pregnancy, third trimester    -  Primary

## 2025-01-14 NOTE — ASSESSMENT & PLAN NOTE
-precautions reviewed  -s/p Tdap/RSV vaccines  -GBS next visit. reviewed  -prepregnancy BMI 19 with goal weight gain 25-35#: TWG = 31#

## 2025-01-22 ENCOUNTER — ROUTINE PRENATAL (OUTPATIENT)
Age: 25
End: 2025-01-22
Payer: COMMERCIAL

## 2025-01-22 ENCOUNTER — RESULTS FOLLOW-UP (OUTPATIENT)
Age: 25
End: 2025-01-22

## 2025-01-22 VITALS
BODY MASS INDEX: 25.91 KG/M2 | SYSTOLIC BLOOD PRESSURE: 118 MMHG | HEIGHT: 62 IN | DIASTOLIC BLOOD PRESSURE: 70 MMHG | WEIGHT: 140.8 LBS | HEART RATE: 83 BPM

## 2025-01-22 DIAGNOSIS — Z3A.36 36 WEEKS GESTATION OF PREGNANCY: ICD-10-CM

## 2025-01-22 DIAGNOSIS — Z34.83 PRENATAL CARE, SUBSEQUENT PREGNANCY, THIRD TRIMESTER: Primary | ICD-10-CM

## 2025-01-22 LAB
SL AMB  POCT GLUCOSE, UA: NORMAL
SL AMB POCT URINE PROTEIN: NORMAL

## 2025-01-22 PROCEDURE — 87150 DNA/RNA AMPLIFIED PROBE: CPT | Performed by: OBSTETRICS & GYNECOLOGY

## 2025-01-22 PROCEDURE — PNV: Performed by: OBSTETRICS & GYNECOLOGY

## 2025-01-22 PROCEDURE — 81002 URINALYSIS NONAUTO W/O SCOPE: CPT | Performed by: OBSTETRICS & GYNECOLOGY

## 2025-01-22 NOTE — ASSESSMENT & PLAN NOTE
Pt doing well today  +FM. Denies ctx, vb and lof.  GBS collected today  Cervical exam 1/50/-2  Precautions reviewed. RTO in 1 week

## 2025-01-22 NOTE — PROGRESS NOTES
Patient presents for a routine prenatal visit    36W2D  Good Fetal Movement  No LOF,bleeding,discharge or cramping.  No current complaints at this time.   Delivery consent is signed.  UTD on Tdap and RSV vaccines.  Declined flu vaccine.

## 2025-01-22 NOTE — PROGRESS NOTES
"Problem List Items Addressed This Visit       36 weeks gestation of pregnancy    Pt doing well today  +FM. Denies ctx, vb and lof.  GBS collected today  Cervical exam /-2  Precautions reviewed. RTO in 1 week          Other Visit Diagnoses         Prenatal care, subsequent pregnancy, third trimester    -  Primary    Relevant Orders    Strep B DNA probe, amplification    POCT urine dip (Completed)            Kenia Oliva is a 24 y.o.  at 36w2d who presents today for routine prenatal visit.     /70 (BP Location: Left arm, Patient Position: Sitting, Cuff Size: Adult)   Pulse 83   Ht 5' 2\" (1.575 m)   Wt 63.9 kg (140 lb 12.8 oz)   LMP 2024   BMI 25.75 kg/m²       FH 35 cm    "

## 2025-01-24 LAB — GP B STREP DNA SPEC QL NAA+PROBE: NEGATIVE

## 2025-01-29 ENCOUNTER — NURSE TRIAGE (OUTPATIENT)
Age: 25
End: 2025-01-29

## 2025-01-29 ENCOUNTER — ROUTINE PRENATAL (OUTPATIENT)
Age: 25
End: 2025-01-29
Payer: COMMERCIAL

## 2025-01-29 VITALS
DIASTOLIC BLOOD PRESSURE: 80 MMHG | SYSTOLIC BLOOD PRESSURE: 120 MMHG | WEIGHT: 143.8 LBS | HEART RATE: 74 BPM | HEIGHT: 62 IN | BODY MASS INDEX: 26.46 KG/M2

## 2025-01-29 DIAGNOSIS — Z34.83 PRENATAL CARE, SUBSEQUENT PREGNANCY IN THIRD TRIMESTER: ICD-10-CM

## 2025-01-29 DIAGNOSIS — Z3A.37 37 WEEKS GESTATION OF PREGNANCY: Primary | ICD-10-CM

## 2025-01-29 LAB
SL AMB  POCT GLUCOSE, UA: NORMAL
SL AMB POCT URINE PROTEIN: NORMAL

## 2025-01-29 PROCEDURE — PNV: Performed by: OBSTETRICS & GYNECOLOGY

## 2025-01-29 PROCEDURE — 81002 URINALYSIS NONAUTO W/O SCOPE: CPT | Performed by: OBSTETRICS & GYNECOLOGY

## 2025-01-29 NOTE — PATIENT COMMUNICATION
37w2d OB patient calling in to report bright red vaginal spotting and single episode of passing blood clot about size of a quarter. States vaginal bleeding has since stopped/became light in color. Reports mild abdominal cramping, but nothing severe. Had cervical check at appointment today. Denies vaginal bleeding, leakage of fluid. Endorses good fetal movement.     Advised pelvic exam can cause spotting and patient should continue to monitor. Recommended to call back if bleeding does not improve, if it worsens, or with any other OB concerns. Patient verbalized understanding.  ESC sent to Dr. Singleton for review. Provider advises if anything changes patient should present to L&D. Patient aware to call with worsening symptoms.

## 2025-01-29 NOTE — PROGRESS NOTES
"Problem List Items Addressed This Visit       37 weeks gestation of pregnancy    Pt doing well today  +FM. Denies ctx, vb and lof.   Some BH contractions  GBS neg  SVE today /-2  Interested in elective induction at 39w  Precautions reviewed. RTO in 1 week          Other Visit Diagnoses         Prenatal care, subsequent pregnancy in third trimester    -  Primary    Relevant Orders    POCT urine dip            Kenia Oliva is a 24 y.o.  at 37w2d who presents today for routine prenatal visit.     /80 (BP Location: Left arm, Patient Position: Sitting, Cuff Size: Adult)   Pulse 74   Ht 5' 2\" (1.575 m)   Wt 65.2 kg (143 lb 12.8 oz)   LMP 2024   BMI 26.30 kg/m²       FH 35 cm    "

## 2025-01-29 NOTE — PROGRESS NOTES
Patient presents for a routine prenatal visit    37W2D  Good Fetal Movement  No LOF,bleeding,discharge or cramping.  Having some BH contractions  No current complaints at this time.   Delivery consent is signed.  UTD on Tdap and RSV vaccines.  Declined flu vaccine.  GBS neg   Topical Clindamycin Pregnancy And Lactation Text: This medication is Pregnancy Category B and is considered safe during pregnancy. It is unknown if it is excreted in breast milk.

## 2025-01-29 NOTE — ASSESSMENT & PLAN NOTE
Pt doing well today  +FM. Denies ctx, vb and lof.   Some BH contractions  GBS neg  SVE today 1/50/-2  Interested in elective induction at 39w  Precautions reviewed. RTO in 1 week

## 2025-01-29 NOTE — TELEPHONE ENCOUNTER
"Reason for Disposition  • Slight SPOTTING after a pelvic examination (single or brief episode)    Answer Assessment - Initial Assessment Questions  1. ONSET: \"When did this bleeding start?\"         Patient reports spotting and \"gooey\" blood clot after cervical exam. Slightly bigger than a quarter.   2. BLEEDING SEVERITY: \"Describe the bleeding that you are having.\" \"How much bleeding is there?\"       Spotting - bright red, but since then light color  3. ABDOMEN PAIN: \"Do you have any pain?\" \"How bad is the pain?\"  (e.g., Scale 0-10; none, mild, moderate, or severe)      Mild cramping, not severe  4. PREGNANCY: \"Do you know how many weeks or months pregnant you are?\"       37w2d  5. PABLO: \"What date are you expecting to deliver?\"      02/17/25  6. FETAL MOVEMENT: \"Has the baby's movement decreased or changed significantly from normal?\"      Good fetal movement   7. HIGH-RISK PREGNANCY:  \"Have been told by your doctor that you have a high-risk condition that can cause bleeding?\"  (e.g., placenta previa, vasa previa)       Denies  8. ULTRASOUND: \"Have you had an ultrasound during this pregnancy?\"  Note: To confirm intrauterine pregnancy, placenta location.      Yes  9. HEMODYNAMIC STATUS: \"Are you weak or feeling lightheaded?\" If Yes, ask: \"Can you stand and walk normally?\"       Denies   10. OTHER SYMPTOMS: \"What other symptoms are you having with the bleeding?\" (e.g., leaking fluid from vagina, contractions)        Denies leakage of fluid. Endorses good fetal movement.    Protocols used: Pregnancy - Vaginal Bleeding Greater Than 20 Weeks EGA-Adult-[place]    "

## 2025-02-03 ENCOUNTER — HOSPITAL ENCOUNTER (EMERGENCY)
Facility: HOSPITAL | Age: 25
Discharge: HOME | End: 2025-02-03
Attending: EMERGENCY MEDICINE | Admitting: OBSTETRICS & GYNECOLOGY
Payer: COMMERCIAL

## 2025-02-03 ENCOUNTER — NURSE TRIAGE (OUTPATIENT)
Age: 25
End: 2025-02-03

## 2025-02-03 ENCOUNTER — HOSPITAL ENCOUNTER (OUTPATIENT)
Facility: HOSPITAL | Age: 25
Discharge: HOME | End: 2025-02-03
Attending: STUDENT IN AN ORGANIZED HEALTH CARE EDUCATION/TRAINING PROGRAM | Admitting: STUDENT IN AN ORGANIZED HEALTH CARE EDUCATION/TRAINING PROGRAM
Payer: COMMERCIAL

## 2025-02-03 VITALS
TEMPERATURE: 98.1 F | RESPIRATION RATE: 18 BRPM | OXYGEN SATURATION: 100 % | HEART RATE: 63 BPM | DIASTOLIC BLOOD PRESSURE: 59 MMHG | SYSTOLIC BLOOD PRESSURE: 117 MMHG | BODY MASS INDEX: 26.13 KG/M2 | HEIGHT: 62 IN | WEIGHT: 142 LBS

## 2025-02-03 VITALS
HEART RATE: 67 BPM | SYSTOLIC BLOOD PRESSURE: 121 MMHG | DIASTOLIC BLOOD PRESSURE: 74 MMHG | RESPIRATION RATE: 24 BRPM | OXYGEN SATURATION: 100 % | TEMPERATURE: 98 F

## 2025-02-03 DIAGNOSIS — Z3A.38 38 WEEKS GESTATION OF PREGNANCY: ICD-10-CM

## 2025-02-03 DIAGNOSIS — R06.89 WINDED: Primary | ICD-10-CM

## 2025-02-03 PROBLEM — O47.9 UTERINE CONTRACTIONS: Status: ACTIVE | Noted: 2025-02-03

## 2025-02-03 LAB
2HR DELTA HS TROPONIN: 0 NG/L
ALBUMIN SERPL BCG-MCNC: 3.3 G/DL (ref 3.5–5)
ALP SERPL-CCNC: 220 U/L (ref 34–104)
ALT SERPL W P-5'-P-CCNC: 12 U/L (ref 7–52)
ANION GAP SERPL CALCULATED.3IONS-SCNC: 6 MMOL/L (ref 4–13)
AST SERPL W P-5'-P-CCNC: 19 U/L (ref 13–39)
ATRIAL RATE: 65 BPM
BASOPHILS # BLD AUTO: 0.02 THOUSANDS/ΜL (ref 0–0.1)
BASOPHILS NFR BLD AUTO: 0 % (ref 0–1)
BILIRUB SERPL-MCNC: 0.28 MG/DL (ref 0.2–1)
BUN SERPL-MCNC: 6 MG/DL (ref 5–25)
CALCIUM ALBUM COR SERPL-MCNC: 9.4 MG/DL (ref 8.3–10.1)
CALCIUM SERPL-MCNC: 8.8 MG/DL (ref 8.4–10.2)
CARDIAC TROPONIN I PNL SERPL HS: 6 NG/L (ref ?–50)
CARDIAC TROPONIN I PNL SERPL HS: 6 NG/L (ref ?–50)
CHLORIDE SERPL-SCNC: 105 MMOL/L (ref 96–108)
CO2 SERPL-SCNC: 23 MMOL/L (ref 21–32)
CREAT SERPL-MCNC: 0.46 MG/DL (ref 0.6–1.3)
EOSINOPHIL # BLD AUTO: 0.09 THOUSAND/ΜL (ref 0–0.61)
EOSINOPHIL NFR BLD AUTO: 1 % (ref 0–6)
ERYTHROCYTE [DISTWIDTH] IN BLOOD BY AUTOMATED COUNT: 13 % (ref 11.6–15.1)
GFR SERPL CREATININE-BSD FRML MDRD: 139 ML/MIN/1.73SQ M
GLUCOSE SERPL-MCNC: 91 MG/DL (ref 65–140)
HCT VFR BLD AUTO: 31.1 % (ref 34.8–46.1)
HGB BLD-MCNC: 10.1 G/DL (ref 11.5–15.4)
IMM GRANULOCYTES # BLD AUTO: 0.06 THOUSAND/UL (ref 0–0.2)
IMM GRANULOCYTES NFR BLD AUTO: 1 % (ref 0–2)
LYMPHOCYTES # BLD AUTO: 1.26 THOUSANDS/ΜL (ref 0.6–4.47)
LYMPHOCYTES NFR BLD AUTO: 15 % (ref 14–44)
MCH RBC QN AUTO: 26.6 PG (ref 26.8–34.3)
MCHC RBC AUTO-ENTMCNC: 32.5 G/DL (ref 31.4–37.4)
MCV RBC AUTO: 82 FL (ref 82–98)
MONOCYTES # BLD AUTO: 0.74 THOUSAND/ΜL (ref 0.17–1.22)
MONOCYTES NFR BLD AUTO: 9 % (ref 4–12)
NEUTROPHILS # BLD AUTO: 6.19 THOUSANDS/ΜL (ref 1.85–7.62)
NEUTS SEG NFR BLD AUTO: 74 % (ref 43–75)
NRBC BLD AUTO-RTO: 0 /100 WBCS
P AXIS: 33 DEGREES
PLATELET # BLD AUTO: 165 THOUSANDS/UL (ref 149–390)
PMV BLD AUTO: 11.8 FL (ref 8.9–12.7)
POTASSIUM SERPL-SCNC: 3.9 MMOL/L (ref 3.5–5.3)
PR INTERVAL: 128 MS
PROT SERPL-MCNC: 6.3 G/DL (ref 6.4–8.4)
QRS AXIS: 79 DEGREES
QRSD INTERVAL: 86 MS
QT INTERVAL: 394 MS
QTC INTERVAL: 409 MS
RBC # BLD AUTO: 3.79 MILLION/UL (ref 3.81–5.12)
SODIUM SERPL-SCNC: 134 MMOL/L (ref 135–147)
T WAVE AXIS: 49 DEGREES
VENTRICULAR RATE: 65 BPM
WBC # BLD AUTO: 8.36 THOUSAND/UL (ref 4.31–10.16)

## 2025-02-03 PROCEDURE — 99212 OFFICE O/P EST SF 10 MIN: CPT

## 2025-02-03 PROCEDURE — 99285 EMERGENCY DEPT VISIT HI MDM: CPT | Performed by: EMERGENCY MEDICINE

## 2025-02-03 PROCEDURE — 36415 COLL VENOUS BLD VENIPUNCTURE: CPT

## 2025-02-03 PROCEDURE — 85025 COMPLETE CBC W/AUTO DIFF WBC: CPT

## 2025-02-03 PROCEDURE — 99214 OFFICE O/P EST MOD 30 MIN: CPT

## 2025-02-03 PROCEDURE — 84484 ASSAY OF TROPONIN QUANT: CPT

## 2025-02-03 PROCEDURE — 99285 EMERGENCY DEPT VISIT HI MDM: CPT

## 2025-02-03 PROCEDURE — 80053 COMPREHEN METABOLIC PANEL: CPT

## 2025-02-03 PROCEDURE — 93005 ELECTROCARDIOGRAM TRACING: CPT

## 2025-02-03 PROCEDURE — 93010 ELECTROCARDIOGRAM REPORT: CPT | Performed by: INTERNAL MEDICINE

## 2025-02-03 NOTE — TELEPHONE ENCOUNTER
"This patient is 38 weeks complaining of shortness of breath since this morning. States it was intermittent, but has been constant for an hour. She was dizzy and lightheaded earlier. She has had intermittent cramping since last night. She denies decreased fetal movement, chest pain, cough, vaginal bleeding, leaking fluid, or any other symptoms to note at this time.  Advised she go to [facility] at this time and have someone drive her.   ESC message to Dr. Huitron.           Reason for Disposition   MODERATE difficulty breathing (e.g., speaks in phrases, SOB even at rest, pulse 100-120) of new-onset or worse than normal    Answer Assessment - Initial Assessment Questions  1. RESPIRATORY STATUS: \"Describe your breathing?\" (e.g., wheezing, shortness of breath, unable to speak, severe coughing)       Shortness of breath   2. ONSET: \"When did this breathing problem begin?\"       This morning   3. PATTERN \"Does the difficult breathing come and go, or has it been constant since it started?\"       Constant for an hour, intermittent prior   4. SEVERITY: \"How bad is your breathing?\" (e.g., mild, moderate, severe)       Moderate   5. RECURRENT SYMPTOM: \"Have you had difficulty breathing before?\" If Yes, ask: \"When was the last time?\" and \"What happened that time?\"       Second trimester   6. CARDIAC HISTORY: \"Do you have any history of heart disease?\" (e.g., heart attack, angina, bypass surgery, angioplasty)       No   7. LUNG HISTORY: \"Do you have any history of lung disease?\"  (e.g., pulmonary embolus, asthma, emphysema)      Asthma when younger   8. CAUSE: \"What do you think is causing the breathing problem?\"       Unknown   9. OTHER SYMPTOMS: \"Do you have any other symptoms?\" (e.g., chest pain, cough, dizziness, fever, runny nose)      Dizziness, lightheaded earlier   10. O2 SATURATION MONITOR:  \"Do you use an oxygen saturation monitor (pulse oximeter) at home?\" If Yes, ask: \"What is your reading (oxygen level) today?\" \"What " "is your usual oxygen saturation reading?\" (e.g., 95%)        No   11. PREGNANCY: \"Is there any chance you are pregnant?\" \"When was your last menstrual period?\"        38 weeks   12. TRAVEL: \"Have you traveled out of the country in the last month?\" (e.g., travel history, exposures)        No    Protocols used: Breathing Difficulty-Adult-[place]    "

## 2025-02-04 NOTE — ED PROVIDER NOTES
Time reflects when diagnosis was documented in both MDM as applicable and the Disposition within this note       Time User Action Codes Description Comment    2/3/2025 11:15 PM Johnny Valente [R06.89] Winded     2/3/2025 11:15 PM Johnny Valente [Z3A.38] 38 weeks gestation of pregnancy           ED Disposition       ED Disposition   Send to L&D After Provider Eval    Condition   --    Date/Time   Mon Feb 3, 2025  9:02 PM    Comment   --             Assessment & Plan       Medical Decision Making  24-year-old female presenting to the ED for complaint of chest tightness and shortness of breath.  When discussing with patient, she describes her shortness of breath as winded.    Concerning for pregnancy changes, arrhythmia, anemia, electrolyte abnormality.  No concern for PE at this time, patient is not tachypneic, not tachycardic, oxygen saturations are 100%, no recent surgeries, no long periods of being still, no signs of DVT.    CBC, CMP, troponin, EKG.  See ED course for interpretation of results.    After workup, results generally unremarkable and consistent with 38 weeks of pregnancy.    A message was sent to the OB team and patient was taken up to OB triage for further evaluation.    Amount and/or Complexity of Data Reviewed  Labs:  Decision-making details documented in ED Course.        ED Course as of 02/03/25 2315   Mon Feb 03, 2025   2036 137 FHR   2112 Comprehensive metabolic panel(!)  Patient is 38 weeks pregnant, unremarkable reassuring CMP.   2112 CBC and differential(!)  Dilutional anemia, no leukocytosis, no signs of infection.   2112 hs TnI 0hr: 6  Reassuring, 2-hour Trop obtained.   2112 hs TnI 2hr: 6  Reassuring, no need for 4-hour Trope.       Medications - No data to display    ED Risk Strat Scores                                              History of Present Illness       Chief Complaint   Patient presents with    Shortness of Breath     Pt reports SOB that started while she was at  "work today. 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 Endorses dizziness that has resolved. Denies chest pain, headache. Pt is 38 weeks pregnant. Kriss vaginal discharge, endorses \"regular abdominal cramping.\"       Past Medical History:   Diagnosis Date    Anxiety     Depression     Lyme disease     Last assessed 3/17/2017    Varicella     Vaccines      Past Surgical History:   Procedure Laterality Date    NO PAST SURGERIES        Family History   Problem Relation Age of Onset    Anemia Mother     Vitamin D deficiency Mother     Hypertension Father     Depression Father     Anxiety disorder Father     Asthma Sister     Depression Sister     Anxiety disorder Sister     OCD Sister     Thyroid disease Maternal Grandmother     Diabetes Maternal Grandmother     Depression Maternal Grandmother     Glaucoma Maternal Grandmother     Anxiety disorder Maternal Grandmother     Suicide Attempts Maternal Grandmother     No Known Problems Maternal Grandfather     Diabetes Paternal Grandmother     Anxiety disorder Paternal Grandmother     No Known Problems Son     Alcohol abuse Neg Hx     Substance Abuse Neg Hx     Mental illness Neg Hx       Social History     Tobacco Use    Smoking status: Former     Current packs/day: 0.00     Average packs/day: 0.5 packs/day for 5.0 years (2.5 ttl pk-yrs)     Types: Cigarettes     Start date: 2017     Quit date: 2022     Years since quittin.5     Passive exposure: Past    Smokeless tobacco: Never    Tobacco comments:     Discontinued without diffiulty   Vaping Use    Vaping status: Former    Quit date: 2024    Substances: THC   Substance Use Topics    Alcohol use: Not Currently     Alcohol/week: 3.0 standard drinks of alcohol     Comment: socially--not currently due to pregnancy    Drug use: Not Currently     Types: Marijuana     Comment: occasionally--stopped since pregnancy      E-Cigarette/Vaping    E-Cigarette Use Former User     Quit Date 24     Comments " quit with confirmed pregnancy       E-Cigarette/Vaping Substances    Nicotine No     THC Yes     CBD No     Flavoring No     Other No     Unknown No       I have reviewed and agree with the history as documented.     24-year-old female 38 weeks pregnant G2,  presenting to the ED for episodes of shortness of breath.  Patient states that throughout her pregnancy she has had episodes of shortness of breath however in the past few days they have been increasing in frequency.  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  Patient had some mild chest pressure and sat down.  Patient states after sitting down, symptoms had resolved.  Patient states that a few hours later, the same symptoms happened while she was at rest but again resolved.  Patient came to the ER to be evaluated.  Patient does endorse mild abdominal cramping but no contraction-like feelings.  Patient states that she has been noticing increasing in her mucous plug.  But patient does not know if she is broken her water.  Patient does feel baby moving.  Patient is scheduled for her induction next week.        Review of Systems   Constitutional:  Negative for chills and fever.   HENT:  Negative for congestion and rhinorrhea.    Eyes:  Negative for visual disturbance.   Respiratory:  Positive for chest tightness and shortness of breath. Negative for cough and wheezing.    Cardiovascular:  Negative for chest pain and palpitations.   Gastrointestinal:  Negative for abdominal pain, diarrhea, nausea and vomiting.   Endocrine: Negative for polyuria.   Genitourinary:  Negative for dysuria.   Musculoskeletal:  Negative for back pain.   Skin:  Negative for color change.   Neurological:  Negative for dizziness, weakness and headaches.   Psychiatric/Behavioral:  Negative for agitation and confusion.             Objective       ED Triage Vitals [02/03/25 1745]   Temperature Pulse Blood Pressure Respirations SpO2 Patient Position - Orthostatic VS   98.4 °F (36.9 °C) 80 122/76 20 99 % Sitting      Temp Source Heart Rate Source BP Location FiO2 (%) Pain Score    Oral Monitor Left arm -- No Pain      Vitals      Date and Time Temp Pulse SpO2 Resp BP Pain Score FACES Pain Rating User   02/03/25 2223 98.1 °F (36.7 °C) 63 100 % 18 117/59 No Pain -- BEE   02/03/25 2016 -- 66 100 % 18 127/59 No Pain -- RL   02/03/25 1745 98.4 °F (36.9 °C) 80 99 % 20 122/76 No Pain -- RL            Physical Exam  Constitutional:       Appearance: She is well-developed.      Comments: Pregnant   HENT:      Head: Normocephalic and atraumatic.      Mouth/Throat:      Mouth: Mucous membranes are moist.   Eyes:      Pupils: Pupils are equal, round, and reactive to light.   Cardiovascular:      Rate and Rhythm: Normal rate and regular rhythm.   Pulmonary:      Effort: Pulmonary effort is normal.      Breath sounds: No decreased breath sounds, wheezing, rhonchi or rales.   Abdominal:      Palpations: Abdomen is soft.   Musculoskeletal:         General: Normal range of motion.      Cervical back: Normal range of motion.   Skin:     General: Skin is warm.      Capillary Refill: Capillary refill takes less than 2 seconds.   Neurological:      General: No focal deficit present.      Mental Status: She is alert and oriented to person, place, and time.   Psychiatric:         Mood and Affect: Mood normal.         Behavior: Behavior normal.         Results Reviewed       Procedure Component Value Units Date/Time    HS Troponin I 2hr [[id number]]  (Normal) Collected: 02/03/25 2020    Lab Status: Final result Specimen: Blood from Arm, Right Updated: 02/03/25 2047     hs TnI 2hr 6 ng/L      Delta 2hr hsTnI 0 ng/L     HS Troponin 0hr (reflex protocol) [[id number]]  (Normal) Collected: 02/03/25 1801    Lab Status: Final result Specimen: Blood from Arm, Right  Updated: 02/03/25 1839     hs TnI 0hr 6 ng/L     Comprehensive metabolic panel [[id number]]  (Abnormal) Collected: 02/03/25 1801    Lab Status: Final result Specimen: Blood from Arm, Right Updated: 02/03/25 1835     Sodium 134 mmol/L      Potassium 3.9 mmol/L      Chloride 105 mmol/L      CO2 23 mmol/L      ANION GAP 6 mmol/L      BUN 6 mg/dL      Creatinine 0.46 mg/dL      Glucose 91 mg/dL      Calcium 8.8 mg/dL      Corrected Calcium 9.4 mg/dL      AST 19 U/L      ALT 12 U/L      Alkaline Phosphatase 220 U/L      Total Protein 6.3 g/dL      Albumin 3.3 g/dL      Total Bilirubin 0.28 mg/dL      eGFR 139 ml/min/1.73sq m     Narrative:      National Kidney Disease Foundation guidelines for Chronic Kidney Disease (CKD):     Stage 1 with normal or high GFR (GFR > 90 mL/min/1.73 square meters)    Stage 2 Mild CKD (GFR = 60-89 mL/min/1.73 square meters)    Stage 3A Moderate CKD (GFR = 45-59 mL/min/1.73 square meters)    Stage 3B Moderate CKD (GFR = 30-44 mL/min/1.73 square meters)    Stage 4 Severe CKD (GFR = 15-29 mL/min/1.73 square meters)    Stage 5 End Stage CKD (GFR <15 mL/min/1.73 square meters)  Note: GFR calculation is accurate only with a steady state creatinine    CBC and differential [[id number]]  (Abnormal) Collected: 02/03/25 1801    Lab Status: Final result Specimen: Blood from Arm, Right Updated: 02/03/25 1814     WBC 8.36 Thousand/uL      RBC 3.79 Million/uL      Hemoglobin 10.1 g/dL      Hematocrit 31.1 %      MCV 82 fL      MCH 26.6 pg      MCHC 32.5 g/dL      RDW 13.0 %      MPV 11.8 fL      Platelets 165 Thousands/uL      nRBC 0 /100 WBCs      Segmented % 74 %      Immature Grans % 1 %      Lymphocytes % 15 %      Monocytes % 9 %      Eosinophils Relative 1 %      Basophils Relative 0 %      Absolute Neutrophils 6.19 Thousands/µL      Absolute Immature Grans 0.06 Thousand/uL      Absolute Lymphocytes 1.26 Thousands/µL      Absolute Monocytes 0.74 Thousand/µL      Eosinophils Absolute 0.09 Thousand/µL       Basophils Absolute 0.02 Thousands/µL             No orders to display       ECG 12 Lead Documentation Only    Date/Time: 2/3/2025 9:12 PM    Performed by: Johnny Alexis MD  Authorized by: Johnny Alexis MD    Indications / Diagnosis:  Shortness of breath  ECG reviewed by me, the ED Provider: yes    Patient location:  ED  Previous ECG:     Previous ECG:  Unavailable  Interpretation:     Interpretation: normal    Rate:     ECG rate:  65    ECG rate assessment: normal    Rhythm:     Rhythm: sinus rhythm    Ectopy:     Ectopy: none    QRS:     QRS axis:  Normal    QRS intervals:  Normal  Conduction:     Conduction: normal    ST segments:     ST segments:  Normal  T waves:     T waves: normal        ED Medication and Procedure Management   Prior to Admission Medications   Prescriptions Last Dose Informant Patient Reported? Taking?   Prenatal MV & Min w/FA-DHA (Prenatal Gummies) 0.18-25 MG CHEW 2/2/2025 Self Yes Yes   Sig: Chew daily      Facility-Administered Medications: None     Current Discharge Medication List        CONTINUE these medications which have NOT CHANGED    Details   Prenatal MV & Min w/FA-DHA (Prenatal Gummies) 0.18-25 MG CHEW Chew daily           No discharge procedures on file.  ED SEPSIS DOCUMENTATION   Time reflects when diagnosis was documented in both MDM as applicable and the Disposition within this note       Time User Action Codes Description Comment    2/3/2025 11:15 PM Johnny Alexis [R06.89] Winded     2/3/2025 11:15 PM Johnny Alexis [Z3A.38] 38 weeks gestation of pregnancy                  Johnny Alexis MD  02/03/25 5033

## 2025-02-04 NOTE — PROGRESS NOTES
"L&D Triage Note - OB/GYN  Kenia Oliva 24 y.o. female MRN: [id number]  Unit/Bed#:  [facility]  Encounter: [id number]    ASSESSMENT  Kenia Oliva is a 24 y.o.  at 38w0d presenting with contractions and vaginal discharge.  Patient was originally evaluated in the ED for shortness of breath.  Had a negative workup and symptoms resolved completely.  In triage, patient was 50/-2, unchanged from her last prenatal visit.  Rupture workup negative.  NST reactive.  She is safe to be discharged home with return precautions.    Complained of loss of mucus plug, ctx every 7 min     PLAN  #1. R/o labor:   SVE: 50/-2  Uterine irritability on toco    #2. R/o SROM:   Neg ferning/pooling/nitrizine    Discharge instructions  Patient instructed to call if experiencing worsening contractions, vaginal bleeding, loss of fluid or decreased fetal movement.  Will follow up with OBGYN on .    D/w Dr. Urban  ______________    SUBJECTIVE    PABLO: Estimated Date of Delivery: 25    HPI:  24 y.o.  38w0d presents with complaint of contractions every 7 to 10 minutes.  She is experiencing cramping in between contractions.  She also reports she has had intermittent leakage or discharge.  She also reports losing her mucous plug today.    Contractions: endorses  Leakage of fluid: endorses vaginal discharge  Vaginal Bleeding: denies  Fetal movement: present    Her obstetrical history is significant for SVDx1    ROS:  Constitutional: Negative  Respiratory: Negative  Cardiovascular: Negative    Gastrointestinal: Negative    OBJECTIVE:    Vitals:  /59 (BP Location: Right arm)   Pulse 63   Temp 98.1 °F (36.7 °C) (Oral)   Resp 18   Ht 5' 2\" (1.575 m)   Wt 64.4 kg (142 lb)   LMP 2024   SpO2 100%   BMI 25.97 kg/m²   Body mass index is 25.97 kg/m².    Physical Exam  Vitals and nursing note reviewed.   Constitutional:       General: She is not in acute distress.     Appearance: She is well-developed. " Behavioral Health Services  Interdisciplinary Discharge Instructions     Transportation: Family  Residence Upon Discharge: [address]  Telephone Number: [phone]         Storm / Yesenia Ashlie Returned: Yes    Patients's own medication returned: N/A    Discharge Instructions: Include work/school/activity/diet restrictions, etc as appropriate: AVS    Appointments / Referrals: Date, address, and phone of physician/psychiatrist, nurse, clinic, as appropriate. AVS    The Behavioral Health Transition Record elements were discussed with the patient, and a copy was provided to the patient and/or caregiver prior to discharge. The record has been sent electronically    The next level of care provider has access to the EMR    Fax    Mail    Transport Personnel   x Patient refused transmission, given to patient    Patient going to IP facility. The 24/7 day contact information including the physician for emergencies related to the IP stay, contact information for pending studies, plan for follow up care and the PCP/Health professional/site designated for follow up care was discussed with the IP facility.   HENT:      Head: Normocephalic and atraumatic.   Eyes:      Conjunctiva/sclera: Conjunctivae normal.   Cardiovascular:      Rate and Rhythm: Normal rate and regular rhythm.      Heart sounds: No murmur heard.  Pulmonary:      Effort: Pulmonary effort is normal. No respiratory distress.      Breath sounds: Normal breath sounds.   Abdominal:      Palpations: Abdomen is soft.      Tenderness: There is no abdominal tenderness.   Musculoskeletal:         General: No swelling.      Cervical back: Neck supple.   Skin:     General: Skin is warm and dry.      Capillary Refill: Capillary refill takes less than 2 seconds.   Neurological:      Mental Status: She is alert.   Psychiatric:         Mood and Affect: Mood normal.         SVE:   1/50/-2    FHT:  Baseline Rate (FHR): 120 bpm  Variability: Moderate  Accelerations: 15 x 15 or greater  Decelerations: None  FHR Category: Category I    TOCO:    Uterine irritability    Labs:   Recent Results (from the past 24 hours)   ECG 12 lead    Collection Time: 02/03/25  6:00 PM   Result Value Ref Range    Ventricular Rate 65 BPM    Atrial Rate 65 BPM    IA Interval 128 ms    QRSD Interval 86 ms    QT Interval 394 ms    QTC Interval 409 ms    P [place] 33 degrees    QRS Axis 79 degrees    T Wave Axis 49 degrees   CBC and differential    Collection Time: 02/03/25  6:01 PM   Result Value Ref Range    WBC 8.36 4.31 - 10.16 Thousand/uL    RBC 3.79 (L) 3.81 - 5.12 Million/uL    Hemoglobin 10.1 (L) 11.5 - 15.4 g/dL    Hematocrit 31.1 (L) 34.8 - 46.1 %    MCV 82 82 - 98 fL    MCH 26.6 (L) 26.8 - 34.3 pg    MCHC 32.5 31.4 - 37.4 g/dL    RDW 13.0 11.6 - 15.1 %    MPV 11.8 8.9 - 12.7 fL    Platelets 165 149 - 390 Thousands/uL    nRBC 0 /100 WBCs    Segmented % 74 43 - 75 %    Immature Grans % 1 0 - 2 %    Lymphocytes % 15 14 - 44 %    Monocytes % 9 4 - 12 %    Eosinophils Relative 1 0 - 6 %    Basophils Relative 0 0 - 1 %    Absolute Neutrophils 6.19 1.85 - 7.62 Thousands/µL    Absolute Immature  "Grans 0.06 0.00 - 0.20 Thousand/uL    Absolute Lymphocytes 1.26 0.60 - 4.47 Thousands/µL    Absolute Monocytes 0.74 0.17 - 1.22 Thousand/µL    Eosinophils Absolute 0.09 0.00 - 0.61 Thousand/µL    Basophils Absolute 0.02 0.00 - 0.10 Thousands/µL   Comprehensive metabolic panel    Collection Time: 02/03/25  6:01 PM   Result Value Ref Range    Sodium 134 (L) 135 - 147 mmol/L    Potassium 3.9 3.5 - 5.3 mmol/L    Chloride 105 96 - 108 mmol/L    CO2 23 21 - 32 mmol/L    ANION GAP 6 4 - 13 mmol/L    BUN 6 5 - 25 mg/dL    Creatinine 0.46 (L) 0.60 - 1.30 mg/dL    Glucose 91 65 - 140 mg/dL    Calcium 8.8 8.4 - 10.2 mg/dL    Corrected Calcium 9.4 8.3 - 10.1 mg/dL    AST 19 13 - 39 U/L    ALT 12 7 - 52 U/L    Alkaline Phosphatase 220 (H) 34 - 104 U/L    Total Protein 6.3 (L) 6.4 - 8.4 g/dL    Albumin 3.3 (L) 3.5 - 5.0 g/dL    Total Bilirubin 0.28 0.20 - 1.00 mg/dL    eGFR 139 ml/min/1.73sq m   HS Troponin 0hr (reflex protocol)    Collection Time: 02/03/25  6:01 PM   Result Value Ref Range    hs TnI 0hr 6 \"Refer to ACS Flowchart\"- see link ng/L   HS Troponin I 2hr    Collection Time: 02/03/25  8:20 PM   Result Value Ref Range    hs TnI 2hr 6 \"Refer to ACS Flowchart\"- see link ng/L    Delta 2hr hsTnI 0 <20 ng/L           Rochelle Christensen MD  2/3/2025  11:28 PM      "

## 2025-02-04 NOTE — ED ATTENDING ATTESTATION
2/3/2025  I, Kevin Rivas MD, saw and evaluated the patient. I have discussed the patient with the resident/non-physician practitioner and agree with the resident's/non-physician practitioner's findings, Plan of Care, and MDM as documented in the resident's/non-physician practitioner's note, except where noted. All available labs and Radiology studies were reviewed.  I was present for key portions of any procedure(s) performed by the resident/non-physician practitioner and I was immediately available to provide assistance.       At this point I agree with the current assessment done in the Emergency Department.  I have conducted an independent evaluation of this patient a history and physical is as follows:    24-year-old female approximately 38 weeks gestation presented for evaluation after feeling winded with exertion at work today.  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  States that time to time she feels winded but better with rest.  No associated chest pain, nausea, diaphoresis.  No unilateral leg swelling.  She is asymptomatic here in the room.  Normal vitals.      ED Course     EKG and lab work unremarkable.  Suspect symptoms related to stage of pregnancy.  She can continue to work as tolerated.  She did also mention she was having a small amount of clear fluid vaginal leakage.  Will refer to labor and delivery for further evaluation of potential amniotic fluid leak.    Critical Care Time  Procedures      
3 = A little assistance

## 2025-02-05 ENCOUNTER — ROUTINE PRENATAL (OUTPATIENT)
Age: 25
End: 2025-02-05
Payer: COMMERCIAL

## 2025-02-05 VITALS
HEIGHT: 62 IN | HEART RATE: 93 BPM | SYSTOLIC BLOOD PRESSURE: 132 MMHG | OXYGEN SATURATION: 99 % | DIASTOLIC BLOOD PRESSURE: 78 MMHG | WEIGHT: 143 LBS | BODY MASS INDEX: 26.31 KG/M2

## 2025-02-05 DIAGNOSIS — R03.0 ELEVATED BP WITHOUT DIAGNOSIS OF HYPERTENSION: ICD-10-CM

## 2025-02-05 DIAGNOSIS — Z34.83 PRENATAL CARE, SUBSEQUENT PREGNANCY, THIRD TRIMESTER: Primary | ICD-10-CM

## 2025-02-05 DIAGNOSIS — Z3A.38 38 WEEKS GESTATION OF PREGNANCY: ICD-10-CM

## 2025-02-05 LAB
SL AMB  POCT GLUCOSE, UA: NORMAL
SL AMB POCT URINE PROTEIN: NORMAL

## 2025-02-05 PROCEDURE — PNV: Performed by: OBSTETRICS & GYNECOLOGY

## 2025-02-05 PROCEDURE — 81002 URINALYSIS NONAUTO W/O SCOPE: CPT | Performed by: OBSTETRICS & GYNECOLOGY

## 2025-02-05 NOTE — ASSESSMENT & PLAN NOTE
Initial bp today 142/82, on rpt 132/78  Pt asymptomatic  Will continue to closely monitor for s/s of pre-e

## 2025-02-05 NOTE — ASSESSMENT & PLAN NOTE
Pt doing well today, c/o increased pressure and tightening  +FM. Denies labor ctx, vb and lof  GBS negative  SVE 1.5/70-2  Desires elective IOL- consents signed  Precautions reviewed. RTO in 1 week

## 2025-02-05 NOTE — PROGRESS NOTES
"Problem List Items Addressed This Visit       38 weeks gestation of pregnancy    Pt doing well today, c/o increased pressure and tightening  +FM. Denies labor ctx, vb and lof  GBS negative  SVE .5-2  Desires elective IOL- consents signed  Precautions reviewed. RTO in 1 week         Elevated BP without diagnosis of hypertension    Initial bp today 142/82, on rpt 132/78  Pt asymptomatic  Will continue to closely monitor for s/s of pre-e          Other Visit Diagnoses         Prenatal care, subsequent pregnancy, third trimester    -  Primary    Relevant Orders    POCT urine dip (Completed)            Kenia Oliva is a 24 y.o.  at 38w2d who presents today for routine prenatal visit.     /78 (BP Location: Left arm, Patient Position: Sitting, Cuff Size: Standard)   Pulse 93   Ht 5' 2\" (1.575 m)   Wt 64.9 kg (143 lb)   LMP 2024   SpO2 99%   BMI 26.16 kg/m²       FH 37 cm    "

## 2025-02-11 ENCOUNTER — TELEPHONE (OUTPATIENT)
Age: 25
End: 2025-02-11

## 2025-02-11 ENCOUNTER — ROUTINE PRENATAL (OUTPATIENT)
Age: 25
End: 2025-02-11
Payer: COMMERCIAL

## 2025-02-11 ENCOUNTER — TELEPHONE (OUTPATIENT)
Dept: OBGYN CLINIC | Facility: CLINIC | Age: 25
End: 2025-02-11

## 2025-02-11 VITALS
SYSTOLIC BLOOD PRESSURE: 110 MMHG | HEIGHT: 64 IN | WEIGHT: 141 LBS | DIASTOLIC BLOOD PRESSURE: 86 MMHG | BODY MASS INDEX: 24.07 KG/M2

## 2025-02-11 DIAGNOSIS — Z34.83 PRENATAL CARE, SUBSEQUENT PREGNANCY, THIRD TRIMESTER: ICD-10-CM

## 2025-02-11 DIAGNOSIS — Z3A.38 38 WEEKS GESTATION OF PREGNANCY: Primary | ICD-10-CM

## 2025-02-11 LAB
SL AMB  POCT GLUCOSE, UA: NORMAL
SL AMB POCT URINE PROTEIN: NORMAL

## 2025-02-11 PROCEDURE — 81002 URINALYSIS NONAUTO W/O SCOPE: CPT | Performed by: NURSE PRACTITIONER

## 2025-02-11 PROCEDURE — PNV: Performed by: NURSE PRACTITIONER

## 2025-02-11 NOTE — PATIENT INSTRUCTIONS
Patient Education     Pregnancy - The Ninth Month   About this topic   It is important for you to learn how to take care of yourself to help you have a healthy baby and safe delivery. It is good to have health care throughout your pregnancy.  The ninth month of your pregnancy starts around week 37 and lasts through delivery. By knowing how far along you are, you can learn what is normal for this stage of your pregnancy and plan for what is next.  General   Your body   During the ninth month of your pregnancy, here are some things you can expect.  You may:  Lose your mucous plug as your cervix starts to get thinner and dilate.  Notice a small amount of streaky red or pink spotting.  Your doctor may discuss stripping your membranes to help the labor progress.  Go into labor any time. Most women deliver their baby between 38 and 42 weeks.  Notice your belly button sticks out. It should go back to normal after you give birth.  Have a bit of extra energy as you get ready for your baby  Notice your breasts are leaking more. This is normal as your body is making the first milk you can feed your baby.  Have tests to check on how your baby is doing. Your doctor will most likely not let you be pregnant for more than 42 weeks.  Not gain any weight this month. Some mothers even lose 1 to 2 pounds (.45 to .9 kg).  Your baby's growth and development:  Your baby has been busy swallowing fluid and building up waste products for their first bowel movement.  Your baby may start sucking their thumb.  They may come out with dry skin, bruises, or a misshapen head. Living in a watery fluid and going through labor is tough on your baby too. These are all normal and will change in the first weeks of life.  Your baby may have lots of hair on their head or not very much at all. Long fingernails are normal.  Your baby is about 20 inches (51 cm) long and weighs about 7 1/2 pounds (3,400 gm). Your baby is about the size of a watermelon.  Things  to Think About   Avoid alcohol, drugs, tobacco products, and second hand smoke.  Talk to your doctor if you plan to travel or get on a plane.  Have your bag packed so you are ready for delivery.  Are you planning a natural childbirth or thinking about an epidural? Know things you can do to help cope with labor pain.  If you are having a boy, decide if you want to have him circumcised.  Be sure the car seat is installed the right way so you are ready to bring your baby home.  When do I need to call the doctor?   Contractions every 5 minutes or more often that do not go away with rest, drinking water, or position changes  Headache that does not go away; blurry vision; seeing spots or halos; increase in swelling in your hands, feet, or face; and pain under your ribs on the right side  Low, dull back pain that does not go away  Pressure in your pelvis that feels like your baby is pushing down  A gush or constant trickle of watery or bloody fluid leaking from your vagina  Little to no movement felt by baby in 2 hours. Your baby should be moving at least 10 times every 2 hours.  Vaginal bleeding with or without pain  Fever of 100.4°F (38°C) or higher  After a car accident, fall, or any trauma to your belly  Having thoughts of harming yourself or others, or do not feel safe at home  Last Reviewed Date   2020-05-06  Consumer Information Use and Disclaimer   This generalized information is a limited summary of diagnosis, treatment, and/or medication information. It is not meant to be comprehensive and should be used as a tool to help the user understand and/or assess potential diagnostic and treatment options. It does NOT include all information about conditions, treatments, medications, side effects, or risks that may apply to a specific patient. It is not intended to be medical advice or a substitute for the medical advice, diagnosis, or treatment of a health care provider based on the health care provider's examination  and assessment of a patient’s specific and unique circumstances. Patients must speak with a health care provider for complete information about their health, medical questions, and treatment options, including any risks or benefits regarding use of medications. This information does not endorse any treatments or medications as safe, effective, or approved for treating a specific patient. UpTo[employer], Inc[employer] and its affiliates disclaim any warranty or liability relating to this information or the use thereof. The use of this information is governed by the Terms of Use, available at https://www.woltersQustodiouwer.com/en/know/clinical-effectiveness-terms   Copyright   Copyright © 2024 [employer]ToDate, Inc. and its affiliates and/or licensors. All rights reserved.

## 2025-02-11 NOTE — TELEPHONE ENCOUNTER
Message sent to RN to schedule induction. Patient is being seen today at 3PM for her prenatal visit.

## 2025-02-11 NOTE — PROGRESS NOTES
Name: Kenia Oliva      : 2000      MRN: [id number]  Encounter Provider: LISA Bryson  Encounter Date: 2025   Encounter department: [facility] [place]  :  Assessment & Plan  Prenatal care, subsequent pregnancy, third trimester  She feels well. She denies LOF/CTX/VB. She did not voice any concerns. Discussed fetal kick counting.  She was encouraged to continue with her perineal/vaginal massages to prevent lacerations during the labor process.  Overview charting updated today.         38 weeks gestation of pregnancy  Patient presents for a routine prenatal visit      39W1D   Good Fetal Movement   No LOF,bleeding,discharge or cramping.   No current complaints at this time.   Delivery consent is signed.   UTD on Tdap and RSV vaccines.   Declined flu vaccine.   GBS neg   IOL scheduled for  7am, ripening canceled  Orders:    POCT urine dip

## 2025-02-11 NOTE — TELEPHONE ENCOUNTER
Pt called stating she is to be sched for an induction at 39 weeks and is now 39 weeks and 2 days and has not heard from anyone is asking to please be called back asap.

## 2025-02-11 NOTE — TELEPHONE ENCOUNTER
----- Message from Lovely Huitron DO sent at 2/11/2025 10:57 AM EST -----  Regarding: IOL  Procedure to be scheduled: IOL  PABLO: Estimated Date of Delivery: 2/17/25  Indication for delivery: elective   Requested date (s) of delivery: ASAP         If requested date is unavailable, is there a date by which the pt must be delivered?  Physician preference: none     If IOL, anticipated method: sherwood pit, AM IOL

## 2025-02-11 NOTE — TELEPHONE ENCOUNTER
Per LISA Leon,  called L & D & cancelled Cervical ripening for Wed., 2/12 at 8PM . Pt is to report to L & D Thursday , 2 /13/ 25 at 0700.     Called pt - informed to report to L &D , [facility] at 0700, Thursday, 2/13/25. Pt verbalized understanding.

## 2025-02-11 NOTE — TELEPHONE ENCOUNTER
----- Message from Lovely Huitron DO sent at 2/11/2025 10:57 AM EST -----  Regarding: IOL  Procedure to be scheduled: IOL  PABLO: Estimated Date of Delivery: 2/17/25  Indication for delivery: elective   Requested date (s) of delivery: ASAP         If requested date is unavailable, is there a date by which the pt must be delivered?  Physician preference: none     If IOL, anticipated method: sherwood pit, AM IOL      Called L & D, renetta Lowe pt scheduled for eIOL, cervical ripening, Wed., 2/12/ 25 at 8PM, into Thursday, 2/13 AM Pitocin.   Called pt to inform- reviewed directions/protocol.  Pt verbalized understanding. -Has an OB office appt today, if overnight ripening is not necessary, will call to cancel evening portion of IOL. Routed to today's provider, LISA Canada,  & OB provider on-call, Dr Alvarado

## 2025-02-11 NOTE — ASSESSMENT & PLAN NOTE
Patient presents for a routine prenatal visit      39W1D   Good Fetal Movement   No LOF,bleeding,discharge or cramping.   No current complaints at this time.   Delivery consent is signed.   UTD on Tdap and RSV vaccines.   Declined flu vaccine.   GBS neg   IOL scheduled for  7am, ripening canceled  Orders:    POCT urine dip

## 2025-02-12 NOTE — TELEPHONE ENCOUNTER
Abby- Charge RN from [facility] called the census is high and patient is scheduled for IOL - this is gong to be cancelled and will need to be reschedule. Asked if we could reschedule patient accordingly.     Patient aware of IOL for 2/13 being cancelled. Spoke with D.S. unit clerk- patient is rescheduled for SAT 2/15- per Daysia this was approved by L&D and provider on call NP.

## 2025-02-12 NOTE — TELEPHONE ENCOUNTER
Patient  rescheduled for IOL on 2/15 7AM with NP    Patient notified of IOL date,time,and location. Advised patient she may eat a light breakfast/dinner prior to going to L&D. In the interim please report any vaginal bleeding,leakage of fluid,decreased fetal movement or contractions.Reviewed fetal kick counts. Advised to keep all upcoming prenatal visits.

## 2025-02-15 ENCOUNTER — ANESTHESIA EVENT (INPATIENT)
Dept: ANESTHESIOLOGY | Facility: HOSPITAL | Age: 25
End: 2025-02-15
Payer: COMMERCIAL

## 2025-02-15 ENCOUNTER — HOSPITAL ENCOUNTER (OUTPATIENT)
Dept: LABOR AND DELIVERY | Facility: HOSPITAL | Age: 25
Discharge: HOME | End: 2025-02-15
Payer: COMMERCIAL

## 2025-02-15 ENCOUNTER — HOSPITAL ENCOUNTER (INPATIENT)
Facility: HOSPITAL | Age: 25
LOS: 2 days | Discharge: HOME | End: 2025-02-17
Attending: OBSTETRICS & GYNECOLOGY | Admitting: OBSTETRICS & GYNECOLOGY
Payer: COMMERCIAL

## 2025-02-15 ENCOUNTER — ANESTHESIA (INPATIENT)
Dept: ANESTHESIOLOGY | Facility: HOSPITAL | Age: 25
End: 2025-02-15
Payer: COMMERCIAL

## 2025-02-15 PROBLEM — Z3A.39 39 WEEKS GESTATION OF PREGNANCY: Status: ACTIVE | Noted: 2024-08-01

## 2025-02-15 LAB
ABO GROUP BLD: NORMAL
BLD GP AB SCN SERPL QL: NEGATIVE
ERYTHROCYTE [DISTWIDTH] IN BLOOD BY AUTOMATED COUNT: 13.2 % (ref 11.6–15.1)
HCT VFR BLD AUTO: 31.6 % (ref 34.8–46.1)
HGB BLD-MCNC: 10.1 G/DL (ref 11.5–15.4)
HOLD SPECIMEN: NORMAL
MCH RBC QN AUTO: 25.6 PG (ref 26.8–34.3)
MCHC RBC AUTO-ENTMCNC: 32 G/DL (ref 31.4–37.4)
MCV RBC AUTO: 80 FL (ref 82–98)
PLATELET # BLD AUTO: 177 THOUSANDS/UL (ref 149–390)
PMV BLD AUTO: 11.7 FL (ref 8.9–12.7)
RBC # BLD AUTO: 3.95 MILLION/UL (ref 3.81–5.12)
RH BLD: POSITIVE
SPECIMEN EXPIRATION DATE: NORMAL
TREPONEMA PALLIDUM IGG+IGM AB [PRESENCE] IN SERUM OR PLASMA BY IMMUNOASSAY: NORMAL
WBC # BLD AUTO: 8.6 THOUSAND/UL (ref 4.31–10.16)

## 2025-02-15 PROCEDURE — 86780 TREPONEMA PALLIDUM: CPT

## 2025-02-15 PROCEDURE — 10H07YZ INSERTION OF OTHER DEVICE INTO PRODUCTS OF CONCEPTION, VIA NATURAL OR ARTIFICIAL OPENING: ICD-10-PCS | Performed by: STUDENT IN AN ORGANIZED HEALTH CARE EDUCATION/TRAINING PROGRAM

## 2025-02-15 PROCEDURE — 3E033VJ INTRODUCTION OF OTHER HORMONE INTO PERIPHERAL VEIN, PERCUTANEOUS APPROACH: ICD-10-PCS | Performed by: STUDENT IN AN ORGANIZED HEALTH CARE EDUCATION/TRAINING PROGRAM

## 2025-02-15 PROCEDURE — 86901 BLOOD TYPING SEROLOGIC RH(D): CPT

## 2025-02-15 PROCEDURE — 86850 RBC ANTIBODY SCREEN: CPT

## 2025-02-15 PROCEDURE — 86900 BLOOD TYPING SEROLOGIC ABO: CPT

## 2025-02-15 PROCEDURE — NC001 PR NO CHARGE: Performed by: OBSTETRICS & GYNECOLOGY

## 2025-02-15 PROCEDURE — 85027 COMPLETE CBC AUTOMATED: CPT

## 2025-02-15 RX ORDER — BUPIVACAINE HYDROCHLORIDE 2.5 MG/ML
30 INJECTION, SOLUTION EPIDURAL; INFILTRATION; INTRACAUDAL ONCE AS NEEDED
Status: DISCONTINUED | OUTPATIENT
Start: 2025-02-15 | End: 2025-02-16

## 2025-02-15 RX ORDER — SODIUM CHLORIDE, SODIUM LACTATE, POTASSIUM CHLORIDE, CALCIUM CHLORIDE 600; 310; 30; 20 MG/100ML; MG/100ML; MG/100ML; MG/100ML
125 INJECTION, SOLUTION INTRAVENOUS CONTINUOUS
Status: DISCONTINUED | OUTPATIENT
Start: 2025-02-15 | End: 2025-02-16

## 2025-02-15 RX ORDER — CALCIUM CARBONATE 500 MG/1
1000 TABLET, CHEWABLE ORAL 3 TIMES DAILY PRN
Status: DISCONTINUED | OUTPATIENT
Start: 2025-02-15 | End: 2025-02-16

## 2025-02-15 RX ORDER — ACETAMINOPHEN 325 MG/1
975 TABLET ORAL EVERY 8 HOURS PRN
Status: DISCONTINUED | OUTPATIENT
Start: 2025-02-15 | End: 2025-02-16

## 2025-02-15 RX ORDER — BUPIVACAINE HYDROCHLORIDE 2.5 MG/ML
INJECTION, SOLUTION EPIDURAL; INFILTRATION; INTRACAUDAL AS NEEDED
Status: DISCONTINUED | OUTPATIENT
Start: 2025-02-15 | End: 2025-02-18 | Stop reason: HOSPADM

## 2025-02-15 RX ORDER — HYDROXYZINE HYDROCHLORIDE 25 MG/1
25 TABLET, FILM COATED ORAL EVERY 6 HOURS PRN
Status: DISCONTINUED | OUTPATIENT
Start: 2025-02-15 | End: 2025-02-16

## 2025-02-15 RX ORDER — ONDANSETRON 2 MG/ML
4 INJECTION INTRAMUSCULAR; INTRAVENOUS EVERY 6 HOURS PRN
Status: DISCONTINUED | OUTPATIENT
Start: 2025-02-15 | End: 2025-02-16

## 2025-02-15 RX ORDER — LIDOCAINE HYDROCHLORIDE AND EPINEPHRINE 15; 5 MG/ML; UG/ML
INJECTION, SOLUTION EPIDURAL AS NEEDED
Status: DISCONTINUED | OUTPATIENT
Start: 2025-02-15 | End: 2025-02-18 | Stop reason: HOSPADM

## 2025-02-15 RX ORDER — OXYTOCIN/RINGER'S LACTATE 30/500 ML
1-30 PLASTIC BAG, INJECTION (ML) INTRAVENOUS
Status: DISCONTINUED | OUTPATIENT
Start: 2025-02-15 | End: 2025-02-16

## 2025-02-15 RX ADMIN — ROPIVACAINE HYDROCHLORIDE: 2 INJECTION, SOLUTION EPIDURAL; INFILTRATION at 22:30

## 2025-02-15 RX ADMIN — LIDOCAINE HYDROCHLORIDE AND EPINEPHRINE 3 ML: 15; 5 INJECTION, SOLUTION EPIDURAL at 22:24

## 2025-02-15 RX ADMIN — BUPIVACAINE HYDROCHLORIDE 1.2 ML: 2.5 INJECTION, SOLUTION EPIDURAL; INFILTRATION; INTRACAUDAL; PERINEURAL at 22:22

## 2025-02-15 RX ADMIN — SODIUM CHLORIDE, SODIUM LACTATE, POTASSIUM CHLORIDE, AND CALCIUM CHLORIDE 125 ML/HR: .6; .31; .03; .02 INJECTION, SOLUTION INTRAVENOUS at 15:03

## 2025-02-15 RX ADMIN — OXYTOCIN 2 MILLI-UNITS/MIN: 10 INJECTION INTRAVENOUS at 16:16

## 2025-02-15 RX ADMIN — SODIUM CHLORIDE, SODIUM LACTATE, POTASSIUM CHLORIDE, AND CALCIUM CHLORIDE 925 ML/HR: .6; .31; .03; .02 INJECTION, SOLUTION INTRAVENOUS at 22:23

## 2025-02-15 RX ADMIN — SODIUM CHLORIDE, SODIUM LACTATE, POTASSIUM CHLORIDE, AND CALCIUM CHLORIDE 125 ML/HR: .6; .31; .03; .02 INJECTION, SOLUTION INTRAVENOUS at 08:02

## 2025-02-15 NOTE — ASSESSMENT & PLAN NOTE
Behavioral Health Community Health Worker  Follow-Up  Completed by:  Lavelle Madrigal    Date:  7/12/2022    Patient Enrollment in Behavioral Health Program:  · Tobi Dennies was enrolled in the Behavioral Health Program on 2/2/22    Assessments     Promis 10:  No flowsheet data found.    Depression PHQ:  PHQ9 7/12/2022   Total Score 7       Generalized Anxiety Disorder 7-Item Scale:  GAD7 7/12/2022   1. Feeling nervous, anxious, or on edge? 2   2. Not being able to stop or control worrying? 2   3. Worrying too much about different things? 2   4. Trouble relaxing? 2   5. Being so restless that it is hard to sit still? 1   6. Becoming easily annoyed or irritable? 1   7. Feeling afraid as if something awful might happen? 0   8. If you checked off any problems, how difficult have these problems made it for you to do your work, take care of things at home, or get along with other people? -   LYLY-7 Score 10       Patients' Global Impression of Change (PGIC) Scale:  Since beginning treatment at this clinic, how would you describe the change (if any) in ACTIVITY LIMITATIONS, SYMPTOMS, EMOTIONS, and OVERALL QUALITY OF LIFE, related to your painful condition?  No Value exists for the : O[facility]#87433      In a similar way, please check the number below that matches your degree of change since beginning care at this clinic (Much better (0) - Much Worse (10)): No Value exists for the : OHS#19188        Much Better                                     No Change                                    Much Worse                        -----------------------------------------------------------------------------                        0       1       2       3       4       5       6       7      8       9      10                     Call Summary     Assessment updated     Growth at 32w1d:  MEASUREMENTS (* Included In Average GA)     AC             27.69 cm        31 weeks 6 days* (37%)  BPD             7.74 cm        31 weeks 1 day * (14%)  HC             28.16 cm        30 weeks 6 days* (2%)  Femur           5.80 cm        30 weeks 2 days* (5%)     HC/AC           1.02 [0.96 - 1.17]                 (26%)  FL/AC             21 [20 - 24]  FL/BPD            75 [71 - 87]  EFW Hadlock 4   1714 grams - 3 lbs 12 oz                 (16%)

## 2025-02-15 NOTE — H&P
H & P- Obstetrics   Kenia Oliva 24 y.o. female MRN: [id number]  Unit/Bed#: -01 Encounter: [id number]      Assessment/Plan:    Kenia is a 24 y.o.  at 39w5d admitted for an induction of labor    SVE: Cervical Dilation: 2  Cervical Effacement: 70  Cervical Consistency: Soft  Fetal Station: -2  Presentation: Vertex  Position: Unknown  Method: Manual  OB Examiner: Camila    * 39 weeks gestation of pregnancy  Assessment & Plan  Cephalic by ultrasound. Clinical EFW by [facility]: 6lbs  GBS negative status  Plan for sherwood balloon and pitocin titration, induction consent signed in office  Analgesia and/or epidural at patient request  Follow up CBC, Syphilis screening, blood type & antibody screen  Method of contraception: undecided    Elevated BP without diagnosis of hypertension  Assessment & Plan  Elevated BP in office on  with normal repeat  CBC/CMP wnl  Continue to monitor blood pressures    Short interval between pregnancies affecting pregnancy, antepartum  Assessment & Plan  Growth at 32w1d:  MEASUREMENTS (* Included In Average GA)     AC             27.69 cm        31 weeks 6 days* (37%)  BPD             7.74 cm        31 weeks 1 day * (14%)  HC             28.16 cm        30 weeks 6 days* (2%)  Femur           5.80 cm        30 weeks 2 days* (5%)     HC/AC           1.02 [0.96 - 1.17]                 (26%)  FL/AC             21 [20 - 24]  FL/BPD            75 [71 - 87]  EFW Hadlock 4   1714 grams - 3 lbs 12 oz                 (16%)               Patient of: [facility]  This patient will be an INPATIENT  and I certify the anticipated length of stay is >2 Midnights  Discussed with Dr. Huitron      SUBJECTIVE:    Chief Complaint:  I am here for my induction    HPI: Kenia Oliva is a 24 y.o.  with an PABLO of 2025, by Ultrasound at 39w5d who is being admitted for elective induction of labor. She reports irregular uterine contractions, has no LOF, and reports no VB. She  states she has felt good FM. This pregnancy is complicated by an elevated blood pressure without diagnosis and a short interval pregnancy. Denies chest pain, SOB, abdominal pain, LE pain or swelling.  All other review of systems is negative.       Pregnancy Plan:  Pregnancy: Hill  Fetal sex: Female  Support person: Antonio Saenz     Delivery Plans  Planned delivery method: Vaginal  Planned delivery location: [facility]  Planned anesthesia: Epidural  Acceptable blood products: All     Post-Delivery Plans  Feeding intentions: Breast Milk and Non-human milk substitute      Patient Active Problem List   Diagnosis    Exercise-induced asthma    Anxiety and depression    Marijuana abuse    Mild protein-calorie malnutrition (HCC)    39 weeks gestation of pregnancy    Short interval between pregnancies affecting pregnancy, antepartum    Uterine contractions    Elevated BP without diagnosis of hypertension       OB History    Para Term  AB Living   2 1 1 0 0 1   SAB IAB Ectopic Multiple Live Births   0 0 0 0 1      # Outcome Date GA Lbr Carlos/2nd Weight Sex Type Anes PTL Lv   2 Current            1 Term 23 40w2d / 00:34 3460 g (7 lb 10.1 oz) M Vag-Spont EPI N MAITE       Past Medical History:   Diagnosis Date    Anxiety     Asthma     sports induced as a child    Depression     Lyme disease     Last assessed 3/17/2017    Varicella     Vaccines       Past Surgical History:   Procedure Laterality Date    NO PAST SURGERIES         Social History     Tobacco Use    Smoking status: Former     Current packs/day: 0.00     Average packs/day: 0.5 packs/day for 5.0 years (2.5 ttl pk-yrs)     Types: Cigarettes     Start date: 2017     Quit date: 2022     Years since quittin.6     Passive exposure: Past    Smokeless tobacco: Never    Tobacco comments:     Discontinued without diffiulty   Substance Use Topics    Alcohol use: Not Currently     Alcohol/week: 3.0 standard drinks of alcohol     Comment: socially--not  currently due to pregnancy       No Known Allergies      Medications Prior to Admission:     Prenatal MV & Min w/FA-DHA (Prenatal Gummies) 0.18-25 MG CHEW        OBJECTIVE:  Vitals:  Temp:  [97.5 °F (36.4 °C)] 97.5 °F (36.4 °C)  HR:  [58-89] 76  BP: (112-135)/(72-83) 128/74  Resp:  [18] 18  Body mass index is 24.37 kg/m².     Physical Exam:  General: Well appearing, no distress  Respiratory: Unlabored breathing, clear to auscultation bilaterally  Cardiovascular: Regular rate and rhythm, no murmurs  Abdomen: Soft, gravid, nontender  Fundal Height: Appropriate for gestational age.  Extremities: Warm and well perfused.  Non tender.  Psychiatric: Behavioral normal        FHT:  Baseline Rate (FHR): 140 bpm  Variability: Moderate  Accelerations: 15 x 15 or greater  Decelerations: None  FHR Category: Category I    TOCO:   Contraction Frequency (minutes): 2-30  Contraction Duration (seconds): 50-70  Contraction Intensity: Mild      Prenatal Labs: I have personally reviewed pertinent reports.  , Blood Type:   Lab Results   Component Value Date/Time    ABO Grouping O 02/15/2025 07:51 AM     , D (Rh type):   Lab Results   Component Value Date/Time    Rh Factor Positive 02/15/2025 07:51 AM     , Antibody Screen: Negative   , HCT/HGB:   Lab Results   Component Value Date/Time    Hematocrit 31.6 (L) 02/15/2025 07:51 AM    Hemoglobin 10.1 (L) 02/15/2025 07:51 AM      , MCV:   Lab Results   Component Value Date/Time    MCV 80 (L) 02/15/2025 07:51 AM      , Platelets:   Lab Results   Component Value Date/Time    Platelets 177 02/15/2025 07:51 AM      , 1 hour Glucola:   Lab Results   Component Value Date/Time    Glucose 111 12/06/2024 12:57 PM   Varicella: unknown     , Rubella:   Lab Results   Component Value Date/Time    Rubella IgG Quant 21.3 12/06/2024 12:57 PM        , VDRL/RPR: Non-reactive   , Urine Culture/Screen:   Lab Results   Component Value Date/Time    Urine Culture <10,000 cfu/ml 12/06/2024 12:57 PM       , Hep B:  "  Lab Results   Component Value Date/Time    Hepatitis B Surface Ag Non-reactive 12/06/2024 12:57 PM     , Hep C:   Lab Results   Component Value Date/Time    Hepatitis C Ab Non-reactive 12/06/2024 12:57 PM      , HIV: Non-reactive  , Chlamydia: Negative  , Gonorrhea:   Lab Results   Component Value Date/Time    N gonorrhoeae, DNA Probe Negative 09/05/2024 01:29 PM     , Group B Strep:    Lab Results   Component Value Date/Time    Strep Grp B PCR Negative 01/22/2025 10:19 AM            Kyung Jensen MD  2/15/2025  9:55 AM        Portions of the record may have been created with voice recognition software.  Occasional wrong word or \"sound a like\" substitutions may have occurred due to the inherent limitations of voice recognition software.  Read the chart carefully and recognize, using context, where substitutions have occurred]    "

## 2025-02-15 NOTE — OB LABOR/OXYTOCIN SAFETY PROGRESS
Labor Progress Note - Kenia Oliva 24 y.o. female MRN: [id number]    Unit/Bed#: -01 Encounter: [id number]    Dose (alondra-units/min) Oxytocin: 0 alondra-units/min (pt discussing options with support person)  Contraction Frequency (minutes): 1-7  Contraction Intensity: Mild  Uterine Activity Characteristics: Irritability  Cervical Dilation: 5        Cervical Effacement: 70  Fetal Station: -1  Baseline Rate (FHR): 125 bpm  Fetal Heart Rate (FHT): 137 BPM  FHR Category: I               Vital Signs:   Vitals:    02/15/25 1559   BP:    Pulse:    Resp:    Temp: 97.9 °F (36.6 °C)       Notes/comments:   FB expelled. SVE as above. FHT cat I. Plan to start pitocin titration. Will reassess in 2 hours or sooner if clinically indicated. Dr. Huitron aware.      Kyung Jensen MD 2/15/2025 4:01 PM

## 2025-02-15 NOTE — PLAN OF CARE
Problem: BIRTH - VAGINAL/ SECTION  Goal: Fetal and maternal status remain reassuring during the birth process  Description: INTERVENTIONS:  - Monitor vital signs  - Monitor fetal heart rate  - Monitor uterine activity  - Monitor labor progression (vaginal delivery)  - DVT prophylaxis  - Antibiotic prophylaxis  Outcome: Progressing  Goal: Emotionally satisfying birthing experience for mother/fetus  Description: Interventions:  - Assess, plan, implement and evaluate the nursing care given to the patient in labor  - Advocate the philosophy that each childbirth experience is a unique experience and support the family's chosen level of involvement and control during the labor process   - Actively participate in both the patient's and family's teaching of the birth process  - Consider cultural, Sikhism and age-specific factors and plan care for the patient in labor  Outcome: Progressing     Problem: POSTPARTUM  Goal: Experiences normal postpartum course  Description: INTERVENTIONS:  - Monitor maternal vital signs  - Assess uterine involution and lochia  Outcome: Progressing  Goal: Appropriate maternal -  bonding  Description: INTERVENTIONS:  - Identify family support  - Assess for appropriate maternal/infant bonding   -Encourage maternal/infant bonding opportunities  - Referral to  or  as needed  Outcome: Progressing  Goal: Establishment of infant feeding pattern  Description: INTERVENTIONS:  - Assess breast/bottle feeding  - Refer to lactation as needed  Outcome: Progressing  Goal: Incision(s), wounds(s) or drain site(s) healing without S/S of infection  Description: INTERVENTIONS  - Assess and document dressing, incision, wound bed, drain sites and surrounding tissue  - Provide patient and family education  - Perform skin care/dressing changes every   Outcome: Progressing     Problem: PAIN - ADULT  Goal: Verbalizes/displays adequate comfort level or baseline comfort  level  Description: Interventions:  - Encourage patient to monitor pain and request assistance  - Assess pain using appropriate pain scale  - Administer analgesics based on type and severity of pain and evaluate response  - Implement non-pharmacological measures as appropriate and evaluate response  - Consider cultural and social influences on pain and pain management  - Notify physician/advanced practitioner if interventions unsuccessful or patient reports new pain  Outcome: Progressing     Problem: INFECTION - ADULT  Goal: Absence or prevention of progression during hospitalization  Description: INTERVENTIONS:  - Assess and monitor for signs and symptoms of infection  - Monitor lab/diagnostic results  - Monitor all insertion sites, i.e. indwelling lines, tubes, and drains  - Monitor endotracheal if appropriate and nasal secretions for changes in amount and color  - [place] appropriate cooling/warming therapies per order  - Administer medications as ordered  - Instruct and encourage patient and family to use good hand hygiene technique  - Identify and instruct in appropriate isolation precautions for identified infection/condition  Outcome: Progressing  Goal: Absence of fever/infection during neutropenic period  Description: INTERVENTIONS:  - Monitor WBC    Outcome: Progressing     Problem: SAFETY ADULT  Goal: Patient will remain free of falls  Description: INTERVENTIONS:  - Educate patient/family on patient safety including physical limitations  - Instruct patient to call for assistance with activity   - Consult OT/PT to assist with strengthening/mobility   - Keep Call bell within reach  - Keep bed low and locked with side rails adjusted as appropriate  - Keep care items and personal belongings within reach  - Initiate and maintain comfort rounds  - Make Fall Risk Sign visible to staff  - Offer Toileting every  Hours, in advance of need  - Initiate/Maintain alarm  - Obtain necessary fall risk management  equipment: - Apply yellow socks and bracelet for high fall risk patients  - Consider moving patient to room near nurses station  Outcome: Progressing  Goal: Maintain or return to baseline ADL function  Description: INTERVENTIONS:  -  Assess patient's ability to carry out ADLs; assess patient's baseline for ADL function and identify physical deficits which impact ability to perform ADLs (bathing, care of mouth/teeth, toileting, grooming, dressing, etc.)  - Assess/evaluate cause of self-care deficits   - Assess range of motion  - Assess patient's mobility; develop plan if impaired  - Assess patient's need for assistive devices and provide as appropriate  - Encourage maximum independence but intervene and supervise when necessary  - Involve family in performance of ADLs  - Assess for home care needs following discharge   - Consider OT consult to assist with ADL evaluation and planning for discharge  - Provide patient education as appropriate  Outcome: Progressing  Goal: Maintains/Returns to pre admission functional level  Description: INTERVENTIONS:  - Perform AM-PAC 6 Click Basic Mobility/ Daily Activity assessment daily.  - Set and communicate daily mobility goal to care team and patient/family/caregiver.   - Collaborate with rehabilitation services on mobility goals if consulted  - Perform Range of Motion  times a day.  - Reposition patient every  hours.  - Dangle patient  times a day  - Stand patient  times a day  - Ambulate patient  times a day  - Out of bed to chair  times a day   - Out of bed for meals  times a day  - Out of bed for toileting  - Record patient progress and toleration of activity level   Outcome: Progressing     Problem: Knowledge Deficit  Goal: Patient/family/caregiver demonstrates understanding of disease process, treatment plan, medications, and discharge instructions  Description: Complete learning assessment and assess knowledge base.  Interventions:  - Provide teaching at level of  understanding  - Provide teaching via preferred learning methods  Outcome: Progressing     Problem: DISCHARGE PLANNING  Goal: Discharge to home or other facility with appropriate resources  Description: INTERVENTIONS:  - Identify barriers to discharge w/patient and caregiver  - Arrange for needed discharge resources and transportation as appropriate  - Identify discharge learning needs (meds, wound care, etc.)  - Arrange for interpretive services to assist at discharge as needed  - Refer to Case Management Department for coordinating discharge planning if the patient needs post-hospital services based on physician/advanced practitioner order or complex needs related to functional status, cognitive ability, or social support system  Outcome: Progressing

## 2025-02-15 NOTE — ASSESSMENT & PLAN NOTE
CBC wnl  Systolic (12hrs), Av , Min:111 , Max:124   Diastolic (12hrs), Av, Min:56, Max:69  - f/u CMP  - continue to monitor BP  - continue to monitor for signs/symptoms of evolving preeclampsia

## 2025-02-15 NOTE — OB LABOR/OXYTOCIN SAFETY PROGRESS
Labor Progress Note - Kenia Oliva 24 y.o. female MRN: [id number]    Unit/Bed#: -01 Encounter: [id number]    Dose (alondra-units/min) Oxytocin: 0 alondra-units/min (pt discussing options with support person)  Contraction Frequency (minutes): 2-5  Contraction Intensity: Mild  Uterine Activity Characteristics: Irregular (irritability)  Cervical Dilation: 2        Cervical Effacement: 70  Fetal Station: -2  Baseline Rate (FHR): 130 bpm  Fetal Heart Rate (FHT): 130 BPM  FHR Category: I               Vital Signs:   Vitals:    02/15/25 1016   BP: 109/64   Pulse: 68   Resp:    Temp:        Notes/comments:   A 24 F Sherwood with a 30 mL balloon was selected.  SVE was performed and cervix was located.  Sherwood was introduced over sterile gloved hands. Balloon advanced through cervix beyond the internal cervical os.  A small amount amount of sterile normal saline solution was instilled in the balloon to confirm placement.  Placement was confirmed to be beyond the internal cervical os.  A total of 60 mL of sterile normal saline solution was instilled into the balloon.  Patient tolerated procedure well.  Instructions left with RN to place Sherwood to tension.  Instructions left with RN to notify MD when Sherwood dislodged.    Patient agreeable to starting pitocin after sherwood balloon is expelled.        Kyung Jensen MD 2/15/2025 11:06 AM

## 2025-02-16 LAB
BASE EXCESS BLDCOV CALC-SCNC: -2.3 MMOL/L (ref 1–9)
HCO3 BLDCOV-SCNC: 22.1 MMOL/L (ref 12.2–28.6)
OXYHGB MFR BLDCOV: 47.7 %
PCO2 BLDCOV: 36.8 MM HG (ref 27–43)
PH BLDCOV: 7.4 [PH] (ref 7.19–7.49)
PO2 BLDCOV: 19.4 MM HG (ref 15–45)
SAO2 % BLDCOV: 9.7 ML/DL

## 2025-02-16 PROCEDURE — 4A1HXCZ MONITORING OF PRODUCTS OF CONCEPTION, CARDIAC RATE, EXTERNAL APPROACH: ICD-10-PCS | Performed by: STUDENT IN AN ORGANIZED HEALTH CARE EDUCATION/TRAINING PROGRAM

## 2025-02-16 PROCEDURE — 59400 OBSTETRICAL CARE: CPT | Performed by: STUDENT IN AN ORGANIZED HEALTH CARE EDUCATION/TRAINING PROGRAM

## 2025-02-16 PROCEDURE — 82805 BLOOD GASES W/O2 SATURATION: CPT | Performed by: OBSTETRICS & GYNECOLOGY

## 2025-02-16 RX ORDER — CALCIUM CARBONATE 500 MG/1
1000 TABLET, CHEWABLE ORAL DAILY PRN
Status: DISCONTINUED | OUTPATIENT
Start: 2025-02-16 | End: 2025-02-17 | Stop reason: HOSPADM

## 2025-02-16 RX ORDER — SENNOSIDES 8.6 MG
1 TABLET ORAL DAILY
Status: DISCONTINUED | OUTPATIENT
Start: 2025-02-16 | End: 2025-02-17 | Stop reason: HOSPADM

## 2025-02-16 RX ORDER — OXYTOCIN/RINGER'S LACTATE 30/500 ML
250 PLASTIC BAG, INJECTION (ML) INTRAVENOUS ONCE
Status: DISCONTINUED | OUTPATIENT
Start: 2025-02-16 | End: 2025-02-17 | Stop reason: HOSPADM

## 2025-02-16 RX ORDER — IBUPROFEN 600 MG/1
600 TABLET, FILM COATED ORAL EVERY 6 HOURS
Status: DISCONTINUED | OUTPATIENT
Start: 2025-02-16 | End: 2025-02-17 | Stop reason: HOSPADM

## 2025-02-16 RX ORDER — DOCUSATE SODIUM 100 MG/1
100 CAPSULE, LIQUID FILLED ORAL 2 TIMES DAILY
Status: DISCONTINUED | OUTPATIENT
Start: 2025-02-16 | End: 2025-02-17 | Stop reason: HOSPADM

## 2025-02-16 RX ORDER — DIPHENHYDRAMINE HYDROCHLORIDE 50 MG/ML
25 INJECTION INTRAMUSCULAR; INTRAVENOUS EVERY 6 HOURS PRN
Status: DISCONTINUED | OUTPATIENT
Start: 2025-02-16 | End: 2025-02-17 | Stop reason: HOSPADM

## 2025-02-16 RX ORDER — ACETAMINOPHEN 325 MG/1
650 TABLET ORAL EVERY 4 HOURS PRN
Status: DISCONTINUED | OUTPATIENT
Start: 2025-02-16 | End: 2025-02-17 | Stop reason: HOSPADM

## 2025-02-16 RX ORDER — BENZOCAINE/MENTHOL 6 MG-10 MG
1 LOZENGE MUCOUS MEMBRANE DAILY PRN
Status: DISCONTINUED | OUTPATIENT
Start: 2025-02-16 | End: 2025-02-17 | Stop reason: HOSPADM

## 2025-02-16 RX ORDER — ONDANSETRON 2 MG/ML
4 INJECTION INTRAMUSCULAR; INTRAVENOUS EVERY 8 HOURS PRN
Status: DISCONTINUED | OUTPATIENT
Start: 2025-02-16 | End: 2025-02-17 | Stop reason: HOSPADM

## 2025-02-16 RX ORDER — SIMETHICONE 80 MG
80 TABLET,CHEWABLE ORAL 4 TIMES DAILY PRN
Status: DISCONTINUED | OUTPATIENT
Start: 2025-02-16 | End: 2025-02-17 | Stop reason: HOSPADM

## 2025-02-16 RX ADMIN — BENZOCAINE AND LEVOMENTHOL 1 APPLICATION: 200; 5 SPRAY TOPICAL at 03:49

## 2025-02-16 RX ADMIN — DOCUSATE SODIUM 100 MG: 100 CAPSULE, LIQUID FILLED ORAL at 18:26

## 2025-02-16 RX ADMIN — DOCUSATE SODIUM 100 MG: 100 CAPSULE, LIQUID FILLED ORAL at 09:59

## 2025-02-16 RX ADMIN — SODIUM CHLORIDE, SODIUM LACTATE, POTASSIUM CHLORIDE, AND CALCIUM CHLORIDE 125 ML/HR: .6; .31; .03; .02 INJECTION, SOLUTION INTRAVENOUS at 01:08

## 2025-02-16 RX ADMIN — IBUPROFEN 600 MG: 600 TABLET, FILM COATED ORAL at 03:49

## 2025-02-16 RX ADMIN — IBUPROFEN 600 MG: 600 TABLET, FILM COATED ORAL at 22:56

## 2025-02-16 RX ADMIN — SENNOSIDES 8.6 MG: 8.6 TABLET, FILM COATED ORAL at 09:59

## 2025-02-16 RX ADMIN — IBUPROFEN 600 MG: 600 TABLET, FILM COATED ORAL at 09:59

## 2025-02-16 RX ADMIN — IBUPROFEN 600 MG: 600 TABLET, FILM COATED ORAL at 15:55

## 2025-02-16 RX ADMIN — ACETAMINOPHEN 650 MG: 325 TABLET, FILM COATED ORAL at 12:47

## 2025-02-16 NOTE — OB LABOR/OXYTOCIN SAFETY PROGRESS
Oxytocin Safety Progress Check Note - Kenia Oliva 24 y.o. female MRN: [id number]    Unit/Bed#: -01 Encounter: [id number]    Dose (alondra-units/min) Oxytocin: 4 alondra-units/min  Contraction Frequency (minutes): 2-3.5  Contraction Intensity: Mild  Uterine Activity Characteristics: Irritability  Cervical Dilation: 5        Cervical Effacement: 70  Fetal Station: -1  Baseline Rate (FHR): 120 bpm  Fetal Heart Rate (FHT): 140 BPM  FHR Category: I               Vital Signs:   Vitals:    02/15/25 1847   BP: 117/58   Pulse: 71   Resp:    Temp:        Notes/comments:   Patient comfortable. FHT category I. SVE as above. Plan to continue pitocin titration. Will reassess in 2 hours or sooner if clinically indicated. Dr. Huitron notified.        Kyung Jensen MD 2/15/2025 7:27 PM

## 2025-02-16 NOTE — OB LABOR/OXYTOCIN SAFETY PROGRESS
Oxytocin Safety Progress Check Note - Kenia Oliva 24 y.o. female MRN: [id number]    Unit/Bed#: -01 Encounter: [id number]    Dose (alondra-units/min) Oxytocin: 6 alondra-units/min  Contraction Frequency (minutes): 2-4  Contraction Intensity: Mild/Moderate  Uterine Activity Characteristics: Irritability  Cervical Dilation: 5        Cervical Effacement: 70  Fetal Station: -1  Baseline Rate (FHR): 130 bpm  Fetal Heart Rate (FHT): 087688 BPM  FHR Category: 1               Vital Signs:   Vitals:    02/2000   BP: 118/63   Pulse: 69   Resp:    Temp:        Notes/comments:   SVE unchanged, FHT cat 1, now requesting epidural, will plan to recheck and possibly AROM once comfortable     Rosa Jamil MD 2/15/2025 10:02 PM

## 2025-02-16 NOTE — PLAN OF CARE
Problem: BIRTH - VAGINAL/ SECTION  Goal: Fetal and maternal status remain reassuring during the birth process  Description: INTERVENTIONS:  - Monitor vital signs  - Monitor fetal heart rate  - Monitor uterine activity  - Monitor labor progression (vaginal delivery)  - DVT prophylaxis  - Antibiotic prophylaxis  Outcome: Progressing  Goal: Emotionally satisfying birthing experience for mother/fetus  Description: Interventions:  - Assess, plan, implement and evaluate the nursing care given to the patient in labor  - Advocate the philosophy that each childbirth experience is a unique experience and support the family's chosen level of involvement and control during the labor process   - Actively participate in both the patient's and family's teaching of the birth process  - Consider cultural, Restoration and age-specific factors and plan care for the patient in labor  Outcome: Progressing     Problem: POSTPARTUM  Goal: Experiences normal postpartum course  Description: INTERVENTIONS:  - Monitor maternal vital signs  - Assess uterine involution and lochia  Outcome: Progressing  Goal: Appropriate maternal -  bonding  Description: INTERVENTIONS:  - Identify family support  - Assess for appropriate maternal/infant bonding   -Encourage maternal/infant bonding opportunities  - Referral to  or  as needed  Outcome: Progressing  Goal: Establishment of infant feeding pattern  Description: INTERVENTIONS:  - Assess breast/bottle feeding  - Refer to lactation as needed  Outcome: Progressing  Goal: Incision(s), wounds(s) or drain site(s) healing without S/S of infection  Description: INTERVENTIONS  - Assess and document dressing, incision, wound bed, drain sites and surrounding tissue  - Provide patient and family education  - Perform skin care/dressing changes every   Outcome: Progressing     Problem: PAIN - ADULT  Goal: Verbalizes/displays adequate comfort level or baseline comfort  level  Description: Interventions:  - Encourage patient to monitor pain and request assistance  - Assess pain using appropriate pain scale  - Administer analgesics based on type and severity of pain and evaluate response  - Implement non-pharmacological measures as appropriate and evaluate response  - Consider cultural and social influences on pain and pain management  - Notify physician/advanced practitioner if interventions unsuccessful or patient reports new pain  Outcome: Progressing     Problem: INFECTION - ADULT  Goal: Absence or prevention of progression during hospitalization  Description: INTERVENTIONS:  - Assess and monitor for signs and symptoms of infection  - Monitor lab/diagnostic results  - Monitor all insertion sites, i.e. indwelling lines, tubes, and drains  - Monitor endotracheal if appropriate and nasal secretions for changes in amount and color  - [place] appropriate cooling/warming therapies per order  - Administer medications as ordered  - Instruct and encourage patient and family to use good hand hygiene technique  - Identify and instruct in appropriate isolation precautions for identified infection/condition  Outcome: Progressing  Goal: Absence of fever/infection during neutropenic period  Description: INTERVENTIONS:  - Monitor WBC    Outcome: Progressing     Problem: SAFETY ADULT  Goal: Patient will remain free of falls  Description: INTERVENTIONS:  - Educate patient/family on patient safety including physical limitations  - Instruct patient to call for assistance with activity   - Consult OT/PT to assist with strengthening/mobility   - Keep Call bell within reach  - Keep bed low and locked with side rails adjusted as appropriate  - Keep care items and personal belongings within reach  - Initiate and maintain comfort rounds  - Make Fall Risk Sign visible to staff  - Offer Toileting every  Hours, in advance of need  - Initiate/Maintain alarm  - Obtain necessary fall risk management  equipment:   - Apply yellow socks and bracelet for high fall risk patients  - Consider moving patient to room near nurses station  Outcome: Progressing  Goal: Maintain or return to baseline ADL function  Description: INTERVENTIONS:  -  Assess patient's ability to carry out ADLs; assess patient's baseline for ADL function and identify physical deficits which impact ability to perform ADLs (bathing, care of mouth/teeth, toileting, grooming, dressing, etc.)  - Assess/evaluate cause of self-care deficits   - Assess range of motion  - Assess patient's mobility; develop plan if impaired  - Assess patient's need for assistive devices and provide as appropriate  - Encourage maximum independence but intervene and supervise when necessary  - Involve family in performance of ADLs  - Assess for home care needs following discharge   - Consider OT consult to assist with ADL evaluation and planning for discharge  - Provide patient education as appropriate  Outcome: Progressing  Goal: Maintains/Returns to pre admission functional level  Description: INTERVENTIONS:  - Perform AM-PAC 6 Click Basic Mobility/ Daily Activity assessment daily.  - Set and communicate daily mobility goal to care team and patient/family/caregiver.   - Collaborate with rehabilitation services on mobility goals if consulted  - Perform Range of Motion  times a day.  - Reposition patient every  hours.  - Dangle patient  times a day  - Stand patient  times a day  - Ambulate patient  times a day  - Out of bed to chair  times a day   - Out of bed for meals  times a day  - Out of bed for toileting  - Record patient progress and toleration of activity level   Outcome: Progressing     Problem: Knowledge Deficit  Goal: Patient/family/caregiver demonstrates understanding of disease process, treatment plan, medications, and discharge instructions  Description: Complete learning assessment and assess knowledge base.  Interventions:  - Provide teaching at level of  understanding  - Provide teaching via preferred learning methods  Outcome: Progressing     Problem: DISCHARGE PLANNING  Goal: Discharge to home or other facility with appropriate resources  Description: INTERVENTIONS:  - Identify barriers to discharge w/patient and caregiver  - Arrange for needed discharge resources and transportation as appropriate  - Identify discharge learning needs (meds, wound care, etc.)  - Arrange for interpretive services to assist at discharge as needed  - Refer to Case Management Department for coordinating discharge planning if the patient needs post-hospital services based on physician/advanced practitioner order or complex needs related to functional status, cognitive ability, or social support system  Outcome: Progressing

## 2025-02-16 NOTE — PLAN OF CARE
Problem: BIRTH - VAGINAL/ SECTION  Goal: Fetal and maternal status remain reassuring during the birth process  Description: INTERVENTIONS:  - Monitor vital signs  - Monitor fetal heart rate  - Monitor uterine activity  - Monitor labor progression (vaginal delivery)  - DVT prophylaxis  - Antibiotic prophylaxis  Outcome: Completed  Goal: Emotionally satisfying birthing experience for mother/fetus  Description: Interventions:  - Assess, plan, implement and evaluate the nursing care given to the patient in labor  - Advocate the philosophy that each childbirth experience is a unique experience and support the family's chosen level of involvement and control during the labor process   - Actively participate in both the patient's and family's teaching of the birth process  - Consider cultural, Yazidi and age-specific factors and plan care for the patient in labor  Outcome: Completed

## 2025-02-16 NOTE — OB LABOR/OXYTOCIN SAFETY PROGRESS
Oxytocin Safety Progress Check Note - Kenia Oliva 24 y.o. female MRN: [id number]    Unit/Bed#: -01 Encounter: [id number]    Dose (alondra-units/min) Oxytocin: 6 alondra-units/min  Contraction Frequency (minutes): 2-4  Contraction Intensity: Mild/Moderate  Uterine Activity Characteristics: Irritability  Cervical Dilation: 6        Cervical Effacement: 80  Fetal Station: -1  Baseline Rate (FHR): 130 bpm  Fetal Heart Rate (FHT): 675078 BPM  FHR Category: II               Vital Signs:   Vitals:    02/2000   BP: 118/63   Pulse: 69   Resp:    Temp:        Notes/comments:   Recurrent variable decelerations in setting of frequent contractions. Moderate variability present. Improved with repositioning. Pitocin held. Will continue to monitor. Will restart pitocin after 30 min of cat I tracing. Dr. Huitron aware.     Kyung Jensen MD 2/15/2025 11:02 PM

## 2025-02-16 NOTE — PLAN OF CARE

## 2025-02-16 NOTE — ANESTHESIA POSTPROCEDURE EVALUATION
Post-Op Assessment Note    CV Status:  Stable  Pain Score: 0    Pain management: adequate      Post-op block assessment: adhesive bandage applied, catheter intact, site cleaned and no complications   Mental Status:  Alert and awake   Hydration Status:  Euvolemic   PONV Controlled:  Controlled   Airway Patency:  Patent     Post Op Vitals Reviewed: Yes    No anethesia notable event occurred.    Staff: CRNA           Last Filed PACU Vitals:  Vitals Value Taken Time   Temp     Pulse 67 02/16/25 0335   /58 02/16/25 0335   Resp     SpO2     Vitals shown include unfiled device data.

## 2025-02-16 NOTE — ANESTHESIA PREPROCEDURE EVALUATION
Procedure:  LABOR ANALGESIA    Relevant Problems   GYN   (+) 39 weeks gestation of pregnancy      NEURO/PSYCH   (+) Anxiety and depression      PULMONARY   (+) Exercise-induced asthma      Behavioral Health   (+) Marijuana abuse      Obstetrics/Gynecology   (+) Short interval between pregnancies affecting pregnancy, antepartum      Other   (+) Elevated BP without diagnosis of hypertension   (+) Mild protein-calorie malnutrition (HCC)     Lab Results   Component Value Date    WBC 8.60 02/15/2025    HGB 10.1 (L) 02/15/2025    HCT 31.6 (L) 02/15/2025    MCV 80 (L) 02/15/2025     02/15/2025     Lab Results   Component Value Date    SODIUM 134 (L) 02/03/2025    K 3.9 02/03/2025     02/03/2025    CO2 23 02/03/2025    BUN 6 02/03/2025    CREATININE 0.46 (L) 02/03/2025    GLUC 91 02/03/2025    CALCIUM 8.8 02/03/2025            Physical Exam    Airway    Mallampati score: II  TM Distance: >3 FB  Neck ROM: full     Dental   No notable dental hx     Cardiovascular  Rhythm: regular, Rate: normal, Cardiovascular exam normal    Pulmonary  Pulmonary exam normal Breath sounds clear to auscultation    Other Findings  post-pubertal.      Anesthesia Plan  ASA Score- 2     Anesthesia Type- epidural with ASA Monitors.         Additional Monitors:     Airway Plan:            Plan Factors-    Chart reviewed.   Existing labs reviewed. Patient summary reviewed.    Patient is not a current smoker. Patient not instructed to abstain from smoking on day of procedure. Patient did not smoke on day of surgery.            Induction-     Postoperative Plan-     Perioperative Resuscitation Plan - Level 1 - Full Code.       Informed Consent- Anesthetic plan and risks discussed with patient.  I personally reviewed this patient with the CRNA. Discussed and agreed on the Anesthesia Plan with the CRNA..      NPO Status:  No vitals data found for the desired time range.

## 2025-02-16 NOTE — L&D DELIVERY NOTE
"Vaginal Delivery Summary - OB/GYN   Kenia Oliva 24 y.o. female MRN: [id number]  Unit/Bed#: -01 Encounter: [id number]    Pre-delivery Diagnosis:   Pregnancy at 39.6wks   Elective IOL   Asthma  Elevated blood pressure without diagnosis     Post-delivery Diagnosis: same, delivered    Procedure: Spontaneous Vaginal Delivery     OBGYN Practice: [facility], later [facility]    Attending Physician: Dr. Huitron  Resident Physician: Dr. Jamil  Other Assistant: none    Anesthesia: Epidural,     QBL: Non-Surgical QBL (mL): 10        Complications: none apparent    Specimens:   1. Arterial and venous cord gases  2. Cord blood  3. Segment of umbilical cord  4. Placenta to storage     Findings:  1. Viable female  on 2025 1:40 AM via Vaginal, Spontaneous . with APGAR scores of 9  and 9  at 1 and 5 minutes, respectively. Weight pending at time of dictation for skin to skin bonding.  2. Spontaneous delivery of intact placenta  3. No laceration       Gases:  Umbilical Artery  No results for input(s): \"PHCART\", \"BECART\" in the last 72 hours.    Umbilical Vein  Recent Labs     25  0143   PHCVEN 7.396   BECVEN -2.3*           Brief history and labor course:  Kenia Oliva is a 24 y.o. [id number] 39w6d . She presented to labor and delivery for elective induction of labor. Her pregnancy was uncomplicated. On exam in triage she was noted to be 2/70/2. She was induced with sherwood balloon and pitocin. Spontaneous rupture of clear membranes.   Description of delivery:    After pushing, Kenia delivered a viable female , wt pending as mother is doing skin to skin bonding. The fetal vertex delivered OA position spontaneously. There was no nuchal cord. The anterior shoulder delivered atraumatically with maternal expulsive forces and the assistance of gentle downward traction. The posterior shoulder delivered with maternal expulsive forces and the assistance of gentle upward traction. The remainder of the fetus " delivered spontaneously.      Upon delivery, the infant was placed on Kenia's abdomen and the cord was doubly clamped and cut. Delayed cord clamping was achieved. The infant was noted to cry spontaneously and was moving all extremities appropriately. There was no evidence for injury. Awaiting nurse resuscitators evaluated the . Arterial and venous cord blood gases and cord blood was collected for analysis. These were promptly sent to the lab. In the immediate post-partum, active management of the 3rd stage of labor was performed with massage, the administration of 30 units of IV pitocin, and gentle traction on the umbilical cord. The placenta delivered spontaneously and was noted to have a centrally inserted 3 vessel cord.  The placenta was sent to storage.      The vagina, cervix, perineum, and rectum were inspected and there was noted to be intact.    At the conclusion of the procedure, all needle, sponge, and instrument counts were noted to be correct. Dr. Huitron was present and participated in all key portions of the case.    Disposition:  The patient and the  both tolerated the procedure well and are recovering in labor and delivery room       Lovely Huitron DO  2025  2:12 AM

## 2025-02-16 NOTE — OB LABOR/OXYTOCIN SAFETY PROGRESS
Oxytocin Safety Progress Check Note - Kenia Oliva 24 y.o. female MRN: [id number]    Unit/Bed#: -01 Encounter: [id number]    Dose (alondra-units/min) Oxytocin: 2 alondra-units/min (per dr ingram)  Contraction Frequency (minutes): 2-3  Contraction Intensity: Moderate  Uterine Activity Characteristics: Regular  Cervical Dilation: 6        Cervical Effacement: 80  Fetal Station: -1  Baseline Rate (FHR): 135 bpm  Fetal Heart Rate (FHT): 135 BPM  FHR Category: 2  Oxytocin Safety Progress Check: Safety check completed            Vital Signs:   Vitals:    02/16/25 0106   BP:    Pulse:    Resp:    Temp: 98.5 °F (36.9 °C)   SpO2:        Notes/comments:   -category 2 tracing with resolution to category 1 after resuscitation of SVE, position change, IUPC  -SROM clear   -recheck prn      Lovely Huitron DO 2/16/2025 1:06 AM

## 2025-02-16 NOTE — ANESTHESIA PROCEDURE NOTES
CSE Block    Patient location during procedure: OB  Start time: 2/15/2025 10:21 PM  Reason for block: procedure for pain and at surgeon's request  Staffing  Performed by: Eleuterio Espinal CRNA  Authorized by: Ruthie Ramsay MD    Preanesthetic Checklist  Completed: patient identified, IV checked, site marked, risks and benefits discussed, surgical consent, monitors and equipment checked, pre-op evaluation and timeout performed  CSE  Patient position: sitting  Prep: ChloraPrep  Patient monitoring: cardiac monitor, continuous pulse ox and frequent blood pressure checks  Approach: midline  Spinal Needle  Needle type: pencil-tip   Needle gauge: 27 G  Needle length: 10 cm  Epidural Needle  Injection technique: ZORA air  Needle type: Tuohy   Needle gauge: 17 G  Needle length: 9 cm  Needle insertion depth: 4 cm  Location: L3-L4  Catheter  Catheter type: side hole  Catheter size: 19 G  Catheter at skin depth: 10 cm  Test dose: negative  Assessment  Sensory level: T6  Injection Assessment:  negative aspiration for heme, no paresthesia on injection, positive aspiration for clear CSF and no pain on injection.  Additional Notes  CSE with ZORA @  4cm; positive CSF trough spinal needle; easy intrathecal injection; easy catheter thread, negative CSF, heme trough catheter; secured @ 10cm

## 2025-02-17 VITALS
TEMPERATURE: 98.1 F | BODY MASS INDEX: 24.24 KG/M2 | SYSTOLIC BLOOD PRESSURE: 138 MMHG | WEIGHT: 142 LBS | HEIGHT: 64 IN | OXYGEN SATURATION: 98 % | RESPIRATION RATE: 20 BRPM | HEART RATE: 58 BPM | DIASTOLIC BLOOD PRESSURE: 81 MMHG

## 2025-02-17 PROBLEM — O13.9 GESTATIONAL HYPERTENSION: Status: ACTIVE | Noted: 2025-02-05

## 2025-02-17 LAB
ALBUMIN SERPL BCG-MCNC: 3.1 G/DL (ref 3.5–5)
ALP SERPL-CCNC: 201 U/L (ref 34–104)
ALT SERPL W P-5'-P-CCNC: 7 U/L (ref 7–52)
ANION GAP SERPL CALCULATED.3IONS-SCNC: 6 MMOL/L (ref 4–13)
AST SERPL W P-5'-P-CCNC: 16 U/L (ref 13–39)
BILIRUB SERPL-MCNC: 0.19 MG/DL (ref 0.2–1)
BUN SERPL-MCNC: 6 MG/DL (ref 5–25)
CALCIUM ALBUM COR SERPL-MCNC: 9.3 MG/DL (ref 8.3–10.1)
CALCIUM SERPL-MCNC: 8.6 MG/DL (ref 8.4–10.2)
CHLORIDE SERPL-SCNC: 106 MMOL/L (ref 96–108)
CO2 SERPL-SCNC: 24 MMOL/L (ref 21–32)
CREAT SERPL-MCNC: 0.53 MG/DL (ref 0.6–1.3)
GFR SERPL CREATININE-BSD FRML MDRD: 133 ML/MIN/1.73SQ M
GLUCOSE SERPL-MCNC: 93 MG/DL (ref 65–140)
POTASSIUM SERPL-SCNC: 3.9 MMOL/L (ref 3.5–5.3)
PROT SERPL-MCNC: 5.8 G/DL (ref 6.4–8.4)
SODIUM SERPL-SCNC: 136 MMOL/L (ref 135–147)

## 2025-02-17 PROCEDURE — 80053 COMPREHEN METABOLIC PANEL: CPT

## 2025-02-17 PROCEDURE — NC001 PR NO CHARGE: Performed by: STUDENT IN AN ORGANIZED HEALTH CARE EDUCATION/TRAINING PROGRAM

## 2025-02-17 PROCEDURE — 99024 POSTOP FOLLOW-UP VISIT: CPT | Performed by: STUDENT IN AN ORGANIZED HEALTH CARE EDUCATION/TRAINING PROGRAM

## 2025-02-17 RX ORDER — CALCIUM CARBONATE 500 MG/1
1000 TABLET, CHEWABLE ORAL 3 TIMES DAILY PRN
Qty: 30 TABLET | Refills: 0 | Status: SHIPPED | OUTPATIENT
Start: 2025-02-17

## 2025-02-17 RX ORDER — BENZOCAINE/MENTHOL 6 MG-10 MG
1 LOZENGE MUCOUS MEMBRANE DAILY PRN
Start: 2025-02-17

## 2025-02-17 RX ORDER — LIDOCAINE 50 MG/G
1 PATCH TOPICAL DAILY
Status: DISCONTINUED | OUTPATIENT
Start: 2025-02-17 | End: 2025-02-17 | Stop reason: HOSPADM

## 2025-02-17 RX ORDER — ACETAMINOPHEN 325 MG/1
650 TABLET ORAL EVERY 6 HOURS PRN
Qty: 30 TABLET | Refills: 0 | Status: SHIPPED | OUTPATIENT
Start: 2025-02-17

## 2025-02-17 RX ORDER — IBUPROFEN 600 MG/1
600 TABLET, FILM COATED ORAL EVERY 6 HOURS PRN
Qty: 30 TABLET | Refills: 0 | Status: SHIPPED | OUTPATIENT
Start: 2025-02-17

## 2025-02-17 RX ADMIN — LIDOCAINE 5% 1 PATCH: 700 PATCH TOPICAL at 08:28

## 2025-02-17 RX ADMIN — DOCUSATE SODIUM 100 MG: 100 CAPSULE, LIQUID FILLED ORAL at 08:10

## 2025-02-17 RX ADMIN — IBUPROFEN 600 MG: 600 TABLET, FILM COATED ORAL at 04:00

## 2025-02-17 RX ADMIN — IBUPROFEN 600 MG: 600 TABLET, FILM COATED ORAL at 10:56

## 2025-02-17 RX ADMIN — ACETAMINOPHEN 650 MG: 325 TABLET, FILM COATED ORAL at 08:10

## 2025-02-17 NOTE — PLAN OF CARE

## 2025-02-17 NOTE — DISCHARGE SUMMARY
Discharge Summary - OB/GYN  Kenia Oliva 24 y.o. female MRN: [id number]  Unit/Bed#: -01 Encounter: [id number]    Admission Date: 2/15/2025     Discharge Date: 25    Admitting Attending: Lovely Huitron DO    Delivering Attending: Dr. Huitron    Discharging Attending: Dr. Soto    Principal Diagnosis: Pregnancy at 39w5d    Secondary Diagnosis, Admission:  Elevated blood pressure without diagnosis    Discharge Diagnosis:  Same, delivered  Gestational hypertension    Procedures: spontaneous vaginal delivery    Anesthesia: epidural    Hospital course:  Kenia Oliva is a 24 y.o. now  who presented at 39w5d with no significant past medical history. Her pregnancy was complicated by elevated blood pressure without diagnosis. She presented to labor and delivery for elective induction of labor. On initial exam she was noted to be 2/70/-2. She was induced with Marie balloon and pitocin. Spontaneous rupture of membranes for clear fluid occurred. She progressed to complete and began to push.    She delivered a viable female  on 2025 at 0140, weight 7lbs 5.1oz via normal spontaneous vaginal delivery. Apgars were 9 (1 min) and 9 (5 min).     The patient sustained no laceration during delivery.  was transferred to  nursery.     Her post-delivery course was complicated by diagnosis of gestational hypertension. Mom's blood type is O positive, so RhoGAM was not indicated.    Her postpartum pain was well controlled with oral analgesics. On day of discharge, she was ambulating and able to reasonably perform all ADLs. She was voiding and had appropriate bowel function. She was discharged home on postpartum day #1 without complications. She was instructed to follow up with her OB as an outpatient and was given appropriate warnings to call provider if she develops signs of infection or uncontrolled pain.    Complications: none apparent    Condition at discharge: good     Discharge medications  and instructions:   Provisions for Follow-Up Care:  See after visit summary    Disposition: Home    Planned Readmission: No    Padmini Torres MD  OBGYN PGY-1  02/17/25  6:18 AM

## 2025-02-17 NOTE — DISCHARGE INSTR - AVS FIRST PAGE
"Discharge instructions:   -Do not place anything (no partner, sex toys, tampons, douche, etc.) in your vagina for 6 weeks  -You may walk for exercise for the first 6 weeks then gradually return to your usual activities   -Please do not drive for 1 week if you have no stitches and for 2 weeks if you have stitches    -Please do not drive if you are taking any narcotic medications  -You may take baths or shower per your preference   -Please examine your breasts in the mirror daily and call your doctor for redness, tenderness, increased warmth, fevers, or chills  -Please call your doctor's office for temperature > 100.4*F or 38*C, worsening pain, increased bleeding (filling 2 or more maxi pads within 1 hr or less for at least 2 hrs), foul-smelling discharge/drainage, burning with urination, or symptoms of depression    For pain, you may take 650mg of Tylenol every 6 hours  For cramping, you may take 600mg of ibuprofen every 6 hours    You can alternate Tylenol and ibuprofen and take them \"around the clock\" to stay ahead of pain. For example, take 650mg of Tylenol at 9 AM, then 600mg of ibuprofen at 12 PM, then 650mg of Tylenol at 3 PM, and so forth.     Please take over the counter stool softener or laxative to ensure you do not strain when moving your bowels. You can take whatever is preferred (Miralax, Senna, Metamucil).    If you have any questions or concerns, please call your doctor.  "

## 2025-02-17 NOTE — PLAN OF CARE
Problem: POSTPARTUM  Goal: Experiences normal postpartum course  Description: INTERVENTIONS:  - Monitor maternal vital signs  - Assess uterine involution and lochia  Outcome: Adequate for Discharge  Goal: Appropriate maternal -  bonding  Description: INTERVENTIONS:  - Identify family support  - Assess for appropriate maternal/infant bonding   -Encourage maternal/infant bonding opportunities  - Referral to  or  as needed  Outcome: Adequate for Discharge  Goal: Establishment of infant feeding pattern  Description: INTERVENTIONS:  - Assess breast/bottle feeding  - Refer to lactation as needed  Outcome: Adequate for Discharge  Goal: Incision(s), wounds(s) or drain site(s) healing without S/S of infection  Description: INTERVENTIONS  - Assess and document dressing, incision, wound bed, drain sites and surrounding tissue  - Provide patient and family education  - Perform skin care/dressing changes every   Outcome: Adequate for Discharge     Problem: PAIN - ADULT  Goal: Verbalizes/displays adequate comfort level or baseline comfort level  Description: Interventions:  - Encourage patient to monitor pain and request assistance  - Assess pain using appropriate pain scale  - Administer analgesics based on type and severity of pain and evaluate response  - Implement non-pharmacological measures as appropriate and evaluate response  - Consider cultural and social influences on pain and pain management  - Notify physician/advanced practitioner if interventions unsuccessful or patient reports new pain  Outcome: Adequate for Discharge     Problem: INFECTION - ADULT  Goal: Absence or prevention of progression during hospitalization  Description: INTERVENTIONS:  - Assess and monitor for signs and symptoms of infection  - Monitor lab/diagnostic results  - Monitor all insertion sites, i.e. indwelling lines, tubes, and drains  - Monitor endotracheal if appropriate and nasal secretions for changes in  amount and color  - [place] appropriate cooling/warming therapies per order  - Administer medications as ordered  - Instruct and encourage patient and family to use good hand hygiene technique  - Identify and instruct in appropriate isolation precautions for identified infection/condition  Outcome: Adequate for Discharge  Goal: Absence of fever/infection during neutropenic period  Description: INTERVENTIONS:  - Monitor WBC    Outcome: Adequate for Discharge     Problem: SAFETY ADULT  Goal: Patient will remain free of falls  Description: INTERVENTIONS:  - Educate patient/family on patient safety including physical limitations  - Instruct patient to call for assistance with activity   - Consult OT/PT to assist with strengthening/mobility   - Keep Call bell within reach  - Keep bed low and locked with side rails adjusted as appropriate  - Keep care items and personal belongings within reach  - Initiate and maintain comfort rounds  - Make Fall Risk Sign visible to staff  - Offer Toileting every  Hours, in advance of need  - Initiate/Maintain alarm  - Obtain necessary fall risk management equipment:   - Apply yellow socks and bracelet for high fall risk patients  - Consider moving patient to room near nurses station  Outcome: Adequate for Discharge  Goal: Maintain or return to baseline ADL function  Description: INTERVENTIONS:  -  Assess patient's ability to carry out ADLs; assess patient's baseline for ADL function and identify physical deficits which impact ability to perform ADLs (bathing, care of mouth/teeth, toileting, grooming, dressing, etc.)  - Assess/evaluate cause of self-care deficits   - Assess range of motion  - Assess patient's mobility; develop plan if impaired  - Assess patient's need for assistive devices and provide as appropriate  - Encourage maximum independence but intervene and supervise when necessary  - Involve family in performance of ADLs  - Assess for home care needs following discharge   -  Consider OT consult to assist with ADL evaluation and planning for discharge  - Provide patient education as appropriate  Outcome: Adequate for Discharge  Goal: Maintains/Returns to pre admission functional level  Description: INTERVENTIONS:  - Perform AM-PAC 6 Click Basic Mobility/ Daily Activity assessment daily.  - Set and communicate daily mobility goal to care team and patient/family/caregiver.   - Collaborate with rehabilitation services on mobility goals if consulted  - Perform Range of Motion  times a day.  - Reposition patient every  hours.  - Dangle patient  times a day  - Stand patient  times a day  - Ambulate patient  times a day  - Out of bed to chair  times a day   - Out of bed for meals  times a day  - Out of bed for toileting  - Record patient progress and toleration of activity level   Outcome: Adequate for Discharge     Problem: Knowledge Deficit  Goal: Patient/family/caregiver demonstrates understanding of disease process, treatment plan, medications, and discharge instructions  Description: Complete learning assessment and assess knowledge base.  Interventions:  - Provide teaching at level of understanding  - Provide teaching via preferred learning methods  Outcome: Adequate for Discharge     Problem: DISCHARGE PLANNING  Goal: Discharge to home or other facility with appropriate resources  Description: INTERVENTIONS:  - Identify barriers to discharge w/patient and caregiver  - Arrange for needed discharge resources and transportation as appropriate  - Identify discharge learning needs (meds, wound care, etc.)  - Arrange for interpretive services to assist at discharge as needed  - Refer to Case Management Department for coordinating discharge planning if the patient needs post-hospital services based on physician/advanced practitioner order or complex needs related to functional status, cognitive ability, or social support system  Outcome: Adequate for Discharge

## 2025-02-17 NOTE — PROGRESS NOTES
"Progress Note - OB/GYN  Kenia Oliva 24 y.o. female MRN: [id number]  Unit/Bed#: -01 Encounter: [id number]    Assessment and Plan   Kenia Oliva is a patient of: [facility]. She is PPD1 s/p spontaneous vaginal delivery. Recovering well and stable.    *  (spontaneous vaginal delivery)  Assessment & Plan  Patient progressing toward postpartum milestones.  - Continue routine postpartum care  - Pain management with oral analgesics  - Encourage breastfeeding, familial bonding    Gestational hypertension  Assessment & Plan  CBC wnl  Systolic (12hrs), Av , Min:111 , Max:124   Diastolic (12hrs), Av, Min:56, Max:69  - f/u CMP  - continue to monitor BP  - continue to monitor for signs/symptoms of evolving preeclampsia        Disposition   - Anticipate discharge home on PPD1    Subjective/Objective   Chief Concern: PPD1 s/p spontaneous vaginal delivery  Subjective:    Kenia Oliva has no current concerns. Pain is well controlled. She is currently voiding. She is ambulating. She is tolerating PO and denies nausea or vomitting. She denies chest pain, shortness of breath, lightheadedness, headaches, visual disturbance, or RUQ pain. Lochia is normal. She is breastfeeding.    Vitals:   /69 (BP Location: Left arm)   Pulse 62   Temp 97.8 °F (36.6 °C) (Oral)   Resp 18   Ht 5' 4\" (1.626 m)   Wt 64.4 kg (142 lb)   LMP 2024   SpO2 98%   Breastfeeding Yes   BMI 24.37 kg/m²     No intake or output data in the 24 hours ending 25 0601    Invasive Devices       None                 Physical Exam:   GEN: well-appearing, alert and oriented x3  CARDIO: regular rate  RESP: nonlabored respirations on room air  ABDOMEN: soft, nontender, nodistended, fundus firm below the umbilicus    Labs:   Hemoglobin   Date Value Ref Range Status   02/15/2025 10.1 (L) 11.5 - 15.4 g/dL Final   2025 10.1 (L) 11.5 - 15.4 g/dL Final   2017 13.0 11.5 - 15.3 g/dL Final     WBC   Date " Value Ref Range Status   02/15/2025 8.60 4.31 - 10.16 Thousand/uL Final   02/03/2025 8.36 4.31 - 10.16 Thousand/uL Final   02/14/2017 5.5 4.5 - 13.0 Thousand/uL Final     Platelets   Date Value Ref Range Status   02/15/2025 177 149 - 390 Thousands/uL Final   02/03/2025 165 149 - 390 Thousands/uL Final   02/14/2017 233 140 - 400 Thousand/uL Final     Creatinine   Date Value Ref Range Status   02/03/2025 0.46 (L) 0.60 - 1.30 mg/dL Final     Comment:     Standardized to IDMS reference method   07/28/2022 0.57 (L) 0.60 - 1.30 mg/dL Final     Comment:     Standardized to IDMS reference method   02/14/2017 0.66 0.50 - 1.00 mg/dL Final     AST   Date Value Ref Range Status   02/03/2025 19 13 - 39 U/L Final   07/28/2022 19 5 - 45 U/L Final     Comment:     Specimen collection should occur prior to Sulfasalazine administration due to the potential for falsely depressed results.    02/14/2017 21 12 - 32 U/L Final     ALT   Date Value Ref Range Status   02/03/2025 12 7 - 52 U/L Final     Comment:     Specimen collection should occur prior to Sulfasalazine administration due to the potential for falsely depressed results.    07/28/2022 21 12 - 78 U/L Final     Comment:     Specimen collection should occur prior to Sulfasalazine and/or Sulfapyridine administration due to the potential for falsely depressed results.    02/14/2017 12 5 - 32 U/L Final     Comment:       Performed at: [facility]  JOSÉ LUIS CINTRON MD, [facility]  [address] [place] [address]            Padmini Torres MD  OBGYN PGY-1  02/17/25  6:01 AM

## 2025-02-20 ENCOUNTER — TELEPHONE (OUTPATIENT)
Dept: OBGYN CLINIC | Facility: CLINIC | Age: 25
End: 2025-02-20

## 2025-02-20 NOTE — TELEPHONE ENCOUNTER
----- Message from Darline YOST sent at 2/19/2025  9:52 AM EST -----  Regarding: Postpartum Check in  delivered 2/16

## 2025-02-20 NOTE — TELEPHONE ENCOUNTER
Any cHTN, gHTN, or preE? GHTN  If yes, 1 week BP check scheduled?  AVS does not indicate 1 week BP check appointment needed.   Date of Delivery:   Delivering Provider: NP  Mode:   Delivery Notes/Complications: elevated BP without diagnosis prior to IOL   Breastfeeding/Formula/Both? Breastfeeding  EPDS-6  postpartum visit scheduled? 3/25 S.S.

## 2025-02-22 LAB — PLACENTA IN STORAGE: NORMAL

## 2025-03-24 NOTE — PROGRESS NOTES
Name: Kenia Oliva      : 2000      MRN: [id number]  Encounter Provider: Brandi Suarez PA-C  Encounter Date: 3/25/2025   Encounter department: [facility]  :  Assessment & Plan  Encounter for postpartum visit  The patient states that she feels great.    Contraception: POPs  rx sent to pharmacy. Use, s/e, time to efficacy, management of missed / late pills reviewed. Discussed return of menses, limitations of tracking menses/ovulation in the first year, and recommended interval between pregnancies.     Reviewed postpartum perineal care and guidelines on resuming sexual activity.   [facility] recommended for lactation consultation, mental health support, and additional resources.   Pelvic Floor Physical Therapy referral declined by patient.   Reviewed signs an symptoms of mastitis with patient, and discussed when to come to the office for evaluation.     Advised to call with any issues, all concerns & questions addressed.     Pap due: 2025    She will return in 3 months for her yearly exam, sooner PRN.        __________________________________________________________________    History of Present Illness   HPI  Kenia Oliva is a 24 y.o. female who presents for a post partum visit.   She is s/p normal spontaneous vaginal delivery.     Gender: female  Apgars: 99  Weight: 3320 g (7 lb 5.1 oz)   Her bleeding: none, just light discharge   She is exclusively pumping without problems.   She denies postpartum blues/depression.      Last Pap: 2022    We discussed contraceptive options while nursing including progesterone only pills, DepoProvera, Nexplanon, IUDs, and condoms.  She would like the POPs.    [place] Scale= 7    Review of Systems   Constitutional: Negative.    Cardiovascular:  Negative for leg swelling.   Gastrointestinal:  Negative for abdominal pain, nausea and vomiting.   Genitourinary:  Negative for pelvic pain, urgency, vaginal  bleeding and vaginal pain.   Neurological:  Negative for headaches.   Psychiatric/Behavioral:  Negative for dysphoric mood, self-injury and suicidal ideas. The patient is not nervous/anxious.      Past Medical History:   Diagnosis Date    Anxiety     Asthma     sports induced as a child    Depression     Lyme disease     Last assessed 3/17/2017    Varicella     Vaccines     Family History   Problem Relation Age of Onset    Anemia Mother     Vitamin D deficiency Mother     Hypertension Father     Depression Father     Anxiety disorder Father     Asthma Sister     Depression Sister     Anxiety disorder Sister     OCD Sister     Thyroid disease Maternal Grandmother     Diabetes Maternal Grandmother     Depression Maternal Grandmother     Glaucoma Maternal Grandmother     Anxiety disorder Maternal Grandmother     Suicide Attempts Maternal Grandmother     No Known Problems Maternal Grandfather     Diabetes Paternal Grandmother     Anxiety disorder Paternal Grandmother     No Known Problems Son     Alcohol abuse Neg Hx     Substance Abuse Neg Hx     Mental illness Neg Hx      Social History     Socioeconomic History    Marital status: Single     Spouse name: None    Number of children: None    Years of education: None    Highest education level: None   Occupational History    None   Tobacco Use    Smoking status: Former     Current packs/day: 0.00     Average packs/day: 0.5 packs/day for 5.0 years (2.5 ttl pk-yrs)     Types: Cigarettes     Start date: 2017     Quit date: 2022     Years since quittin.7     Passive exposure: Past    Smokeless tobacco: Never    Tobacco comments:     Discontinued without diffiulty   Vaping Use    Vaping status: Former    Quit date: 2024    Substances: THC   Substance and Sexual Activity    Alcohol use: Not Currently     Alcohol/week: 3.0 standard drinks of alcohol     Comment: socially--not currently due to pregnancy    Drug use: Not Currently     Types: Marijuana     Comment:  occasionally--stopped since pregnancy    Sexual activity: Yes     Partners: Male     Birth control/protection: None   Other Topics Concern    None   Social History Narrative    Lives with mom and dad and older sister    Pets - 2 dogs    No passive tobacco smoke exposure    Has smoke and CO detectors    Guns in home locked in safe    Wears seat belt in car    Activities; Soccer    Dental care regularly; Good dental hygiene    High school student; Public school    Sleeps 6-7 hours a day    Vegan diet         Social Drivers of Health     Financial Resource Strain: Not on file   Food Insecurity: No Food Insecurity (2/17/2025)    Nursing - Inadequate Food Risk Classification     Worried About Running Out of Food in the Last Year: Never true     Ran Out of Food in the Last Year: Never true     Ran Out of Food in the Last Year: Never true   Transportation Needs: No Transportation Needs (2/17/2025)    Nursing - Transportation Risk Classification     Lack of Transportation: Not on file     Lack of Transportation: No   Physical Activity: Unknown (4/16/2019)    Exercise Vital Sign     Days of Exercise per Week: 4 days     Minutes of Exercise per Session: Not asked   Stress: Stress Concern Present (3/13/2019)    [employer] - Occupational Stress Questionnaire     Feeling of Stress : Rather much   Social Connections: Unknown (6/18/2024)    Received from [facility]     How often do you feel lonely or isolated from those around you? (Adult - for ages 18 years and over): Not on file   Intimate Partner Violence: Unknown (2/17/2025)    Nursing IPS     Feels Physically and Emotionally Safe: Not on file     Physically Hurt by Someone: Not on file     Humiliated or Emotionally Abused by Someone: Not on file     Physically Hurt by Someone: No     Hurt or Threatened by Someone: No   Housing Stability: Unknown (2/17/2025)    Nursing: Inadequate Housing Risk Classification     Has Housing:  Not on file     Worried About Losing Housing: Not on file     Unable to Get Utilities: Not on file     Unable to Pay for Housing in the Last Year: No     Has Housin       Objective   /72   Wt 60.7 kg (133 lb 12.8 oz)   LMP 2024   BMI 22.97 kg/m²      Physical Exam  Constitutional:       General: She is not in acute distress.     Appearance: Normal appearance. She is well-developed and well-groomed. She is not ill-appearing.   HENT:      Head: Normocephalic and atraumatic.   Pulmonary:      Effort: Pulmonary effort is normal.   Abdominal:      General: Abdomen is flat.      Tenderness: There is no abdominal tenderness.   Musculoskeletal:      Right lower leg: No edema.      Left lower leg: No edema.   Neurological:      General: No focal deficit present.      Mental Status: She is alert.   Psychiatric:         Mood and Affect: Mood normal.         Behavior: Behavior normal. Behavior is cooperative.         Thought Content: Thought content normal.         Judgment: Judgment normal.   Vitals reviewed.

## 2025-03-25 ENCOUNTER — POSTPARTUM VISIT (OUTPATIENT)
Dept: OBGYN CLINIC | Facility: CLINIC | Age: 25
End: 2025-03-25

## 2025-03-25 VITALS — SYSTOLIC BLOOD PRESSURE: 104 MMHG | BODY MASS INDEX: 22.97 KG/M2 | WEIGHT: 133.8 LBS | DIASTOLIC BLOOD PRESSURE: 72 MMHG

## 2025-03-25 DIAGNOSIS — Z30.011 ENCOUNTER FOR INITIAL PRESCRIPTION OF CONTRACEPTIVE PILLS: ICD-10-CM

## 2025-03-25 PROCEDURE — 99024 POSTOP FOLLOW-UP VISIT: CPT

## 2025-03-25 RX ORDER — ACETAMINOPHEN AND CODEINE PHOSPHATE 120; 12 MG/5ML; MG/5ML
1 SOLUTION ORAL DAILY
Qty: 84 TABLET | Refills: 3 | Status: SHIPPED | OUTPATIENT
Start: 2025-03-25

## 2025-05-11 ENCOUNTER — OFFICE VISIT (OUTPATIENT)
Age: 25
End: 2025-05-11
Payer: COMMERCIAL

## 2025-05-11 VITALS
BODY MASS INDEX: 21.8 KG/M2 | HEART RATE: 78 BPM | SYSTOLIC BLOOD PRESSURE: 122 MMHG | DIASTOLIC BLOOD PRESSURE: 61 MMHG | WEIGHT: 127 LBS | OXYGEN SATURATION: 99 % | TEMPERATURE: 99.7 F | RESPIRATION RATE: 18 BRPM

## 2025-05-11 DIAGNOSIS — N39.0 URINARY TRACT INFECTION WITH HEMATURIA, SITE UNSPECIFIED: Primary | ICD-10-CM

## 2025-05-11 DIAGNOSIS — R31.9 URINARY TRACT INFECTION WITH HEMATURIA, SITE UNSPECIFIED: Primary | ICD-10-CM

## 2025-05-11 LAB
SL AMB  POCT GLUCOSE, UA: NEGATIVE
SL AMB LEUKOCYTE ESTERASE,UA: ABNORMAL
SL AMB POCT BILIRUBIN,UA: NEGATIVE
SL AMB POCT BLOOD,UA: ABNORMAL
SL AMB POCT CLARITY,UA: ABNORMAL
SL AMB POCT COLOR,UA: YELLOW
SL AMB POCT KETONES,UA: 15
SL AMB POCT NITRITE,UA: POSITIVE
SL AMB POCT PH,UA: 7
SL AMB POCT SPECIFIC GRAVITY,UA: 1.01
SL AMB POCT URINE HCG: NEGATIVE
SL AMB POCT URINE PROTEIN: 300
SL AMB POCT UROBILINOGEN: 0.2

## 2025-05-11 PROCEDURE — 81002 URINALYSIS NONAUTO W/O SCOPE: CPT | Performed by: PHYSICIAN ASSISTANT

## 2025-05-11 PROCEDURE — 81025 URINE PREGNANCY TEST: CPT | Performed by: PHYSICIAN ASSISTANT

## 2025-05-11 PROCEDURE — S9083 URGENT CARE CENTER GLOBAL: HCPCS | Performed by: PHYSICIAN ASSISTANT

## 2025-05-11 PROCEDURE — 87086 URINE CULTURE/COLONY COUNT: CPT | Performed by: PHYSICIAN ASSISTANT

## 2025-05-11 PROCEDURE — G0383 LEV 4 HOSP TYPE B ED VISIT: HCPCS | Performed by: PHYSICIAN ASSISTANT

## 2025-05-11 RX ORDER — CEPHALEXIN 500 MG/1
500 CAPSULE ORAL EVERY 12 HOURS SCHEDULED
Qty: 14 CAPSULE | Refills: 0 | Status: SHIPPED | OUTPATIENT
Start: 2025-05-11 | End: 2025-05-18

## 2025-05-11 NOTE — PROGRESS NOTES
[facility]        NAME: Kenia Oliva is a 24 y.o. female  : 2000    MRN: [id number]  DATE: May 11, 2025  TIME: 12:52 PM    Assessment and Plan   Urinary tract infection with hematuria, site unspecified [N39.0, R31.9]  1. Urinary tract infection with hematuria, site unspecified  Urine culture    cephalexin (KEFLEX) 500 mg capsule    POCT urine dip    POCT urine HCG      Patient presents with symptoms and examination concerning for possible UTI.  UA is consistent with UTI with positive nitrates moderate leuk esterase positive ketones and large blood.  Patient be started empirically on Keflex to treat.  Discussed Pyridium however this is contraindicated in breast-feeding so she will use of Motrin and Tylenol as needed for pain.    Medical Decision Making     PROBLEM: 1 acute, uncomplicated illness or injury    DATA: Test(s) Ordered: yes, urine dip, urine HCG, and urine culture.     RISK: Prescription drug management    TIME: 15 minutes      Patient Instructions     Patient Instructions   Take antibiotics as prescribed. Complete the entire course. If you do not complete the entire course this may result in bacterial resistance making future infections very difficult or impossible to treat. You should never have leftover antibiotics unless your antibiotic is switched. Note that if you are taking birth control medications, antibiotics can decrease their effectiveness. Recommend abstinence or back up method while on antibiotics and for 3-5 days after completing the antibiotic course.   Motrin and Tylenol as needed for pain.   We send all positive urine for culture, we will call you if your antibiotic needs to be changed based on culture results.   If symptoms do not improve in 2-3 days, follow-up with your primary care provider.   If you develop fever, chills, or worsening low back pain report to the emergency room. These are symptoms of a more serious condition or possible spread of the infection into  your bloodstream.      Follow up with PCP in 3-5 days.  Proceed to  ER if symptoms worsen.    If tests have been performed at [facility], our office will contact you with results if changes need to be made to the care plan discussed with you at the visit. [facility].     Chief Complaint     Chief Complaint   Patient presents with    Abdominal Pain     Abdominal pain comes and goes since Wednesday with a bloody mucus when she urinates. C/O diarrhea that started Monday and was over yesterday. On Wednesday vomitted x2 with the abd pain.         History of Present Illness       24 year old female presents with intermittent abdominal pain especially with urination since Wednesday. Pt reports that she developed diarrhea on Monday and that persisted through yesterday. Notes urgency of urination as well. N/V x 2 on Wednesday. Pt recently pregnant, delivered 02/16/2025 she is exclusively pumping at this time.     Abdominal Pain  Associated symptoms include dysuria, hematuria, nausea and vomiting. Pertinent negatives include no fever.       Review of Systems   Review of Systems   Constitutional:  Negative for chills and fever.   Gastrointestinal:  Positive for abdominal pain, nausea and vomiting. Negative for blood in stool.   Genitourinary:  Positive for dysuria, hematuria, urgency and vaginal discharge. Negative for vaginal bleeding.   Musculoskeletal:  Negative for back pain.   Neurological:  Negative for dizziness and light-headedness.         Current Medications       Current Outpatient Medications:     cephalexin (KEFLEX) 500 mg capsule, Take 1 capsule (500 mg total) by mouth every 12 (twelve) hours for 7 days, Disp: 14 capsule, Rfl: 0    norethindrone (Shruthi) 0.35 MG tablet, Take 1 tablet (0.35 mg total) by mouth daily, Disp: 84 tablet, Rfl: 3    acetaminophen (TYLENOL) 325 mg tablet, Take 2 tablets (650 mg total) by mouth every 6 (six) hours as needed for mild pain or  moderate pain (Patient not taking: Reported on 5/11/2025), Disp: 30 tablet, Rfl: 0    benzocaine-menthol-lanolin-aloe (DERMOPLAST) 20-0.5 % topical spray, Apply 1 Application topically every 6 (six) hours as needed for mild pain or irritation (Patient not taking: Reported on 5/11/2025), Disp: , Rfl:     calcium carbonate (TUMS) 500 mg chewable tablet, Chew 2 tablets (1,000 mg total) 3 (three) times a day as needed for indigestion or heartburn (Patient not taking: Reported on 5/11/2025), Disp: 30 tablet, Rfl: 0    hydrocortisone 1 % cream, Apply 1 Application topically daily as needed for irritation or rash (Patient not taking: Reported on 5/11/2025), Disp: , Rfl:     ibuprofen (MOTRIN) 600 mg tablet, Take 1 tablet (600 mg total) by mouth every 6 (six) hours as needed for mild pain or moderate pain (Patient not taking: Reported on 5/11/2025), Disp: 30 tablet, Rfl: 0    Prenatal MV & Min w/FA-DHA (Prenatal Gummies) 0.18-25 MG CHEW, Chew daily (Patient not taking: Reported on 5/11/2025), Disp: , Rfl:     witch hazel-glycerin (TUCKS) topical pad, Apply 1 Pad topically every 4 (four) hours as needed for irritation (Patient not taking: Reported on 5/11/2025), Disp: , Rfl:     Current Allergies     Allergies as of 05/11/2025    (No Known Allergies)            The following portions of the patient's history were reviewed and updated as appropriate: allergies, current medications, past family history, past medical history, past social history, past surgical history and problem list.     Past Medical History:   Diagnosis Date    Anxiety     Asthma     sports induced as a child    Depression     Lyme disease     Last assessed 3/17/2017    Varicella     Vaccines       Past Surgical History:   Procedure Laterality Date    NO PAST SURGERIES         Family History   Problem Relation Age of Onset    Anemia Mother     Vitamin D deficiency Mother     Hypertension Father     Depression Father     Anxiety disorder Father     Asthma  Sister     Depression Sister     Anxiety disorder Sister     OCD Sister     Thyroid disease Maternal Grandmother     Diabetes Maternal Grandmother     Depression Maternal Grandmother     Glaucoma Maternal Grandmother     Anxiety disorder Maternal Grandmother     Suicide Attempts Maternal Grandmother     No Known Problems Maternal Grandfather     Diabetes Paternal Grandmother     Anxiety disorder Paternal Grandmother     No Known Problems Son     Alcohol abuse Neg Hx     Substance Abuse Neg Hx     Mental illness Neg Hx          Medications have been verified.        Objective   /61 (Patient Position: Sitting)   Pulse 78   Temp 99.7 °F (37.6 °C) (Tympanic)   Resp 18   Wt 57.6 kg (127 lb)   SpO2 99%   Breastfeeding Yes   BMI 21.80 kg/m²   No LMP recorded.       Physical Exam     Physical Exam  Vitals and nursing note reviewed.   Constitutional:       General: She is awake. She is not in acute distress.     Appearance: Normal appearance. She is well-developed and well-groomed. She is not ill-appearing, toxic-appearing or diaphoretic.   HENT:      Head: Normocephalic and atraumatic.      Right Ear: Hearing and external ear normal.      Left Ear: Hearing and external ear normal.   Eyes:      General: Lids are normal. Vision grossly intact. Gaze aligned appropriately.   Cardiovascular:      Rate and Rhythm: Normal rate.   Pulmonary:      Effort: Pulmonary effort is normal.   Abdominal:      General: Abdomen is flat. Bowel sounds are normal.      Palpations: Abdomen is soft.      Tenderness: There is abdominal tenderness in the right lower quadrant, suprapubic area and left lower quadrant. There is no right CVA tenderness or left CVA tenderness.   Musculoskeletal:      Cervical back: Normal range of motion.   Skin:     General: Skin is warm and dry.   Neurological:      Mental Status: She is alert and oriented to person, place, and time.      Coordination: Coordination is intact.      Gait: Gait is intact.  "  Psychiatric:         Attention and Perception: Attention and perception normal.         Mood and Affect: Mood and affect normal.         Speech: Speech normal.         Behavior: Behavior normal. Behavior is cooperative.               Note: Portions of this record may have been created with voice recognition software. Occasional wrong word or \"sound a like\" substitutions may have occurred due to the inherent limitations of voice recognition software. Please read the chart carefully and recognize, using context, where substitutions have occurred.*      "

## 2025-05-11 NOTE — PATIENT INSTRUCTIONS
Take antibiotics as prescribed. Complete the entire course. If you do not complete the entire course this may result in bacterial resistance making future infections very difficult or impossible to treat. You should never have leftover antibiotics unless your antibiotic is switched. Note that if you are taking birth control medications, antibiotics can decrease their effectiveness. Recommend abstinence or back up method while on antibiotics and for 3-5 days after completing the antibiotic course.   Motrin and Tylenol as needed for pain.   We send all positive urine for culture, we will call you if your antibiotic needs to be changed based on culture results.   If symptoms do not improve in 2-3 days, follow-up with your primary care provider.   If you develop fever, chills, or worsening low back pain report to the emergency room. These are symptoms of a more serious condition or possible spread of the infection into your bloodstream.

## 2025-05-13 ENCOUNTER — RESULTS FOLLOW-UP (OUTPATIENT)
Age: 25
End: 2025-05-13

## 2025-05-13 LAB — BACTERIA UR CULT: NORMAL

## 2025-05-22 ENCOUNTER — TELEPHONE (OUTPATIENT)
Age: 25
End: 2025-05-22

## 2025-05-22 NOTE — LETTER
May 23, 2025     Patient: Kenia Oliva  YOB: 2000  Date of Visit: 5/22/2025      To Whom it May Concern:    Kenia Oliva is under my professional care. Kenia was seen in my office on 3/25/2025. Kenia may return to work on 5/23/2025 without any restrictions.    If you have any questions or concerns, please don't hesitate to call.         Sincerely,          Brandi Suarez PA-C        CC: No Recipients

## 2025-05-22 NOTE — TELEPHONE ENCOUNTER
Pt is requesting a RTW letter that states she has no restrictions. Her anticipated day back was supposed to be 5/11/25. Please upload to [facility].

## 2025-06-27 ENCOUNTER — PATIENT MESSAGE (OUTPATIENT)
Age: 25
End: 2025-06-27